# Patient Record
Sex: FEMALE | Race: ASIAN | Employment: PART TIME | URBAN - METROPOLITAN AREA
[De-identification: names, ages, dates, MRNs, and addresses within clinical notes are randomized per-mention and may not be internally consistent; named-entity substitution may affect disease eponyms.]

---

## 2017-05-19 ENCOUNTER — HOSPITAL ENCOUNTER (EMERGENCY)
Facility: CLINIC | Age: 32
Discharge: HOME | End: 2017-05-20
Attending: EMERGENCY MEDICINE | Admitting: EMERGENCY MEDICINE

## 2017-05-19 ENCOUNTER — APPOINTMENT (OUTPATIENT)
Dept: GENERAL RADIOLOGY | Facility: CLINIC | Age: 32
End: 2017-05-19
Attending: EMERGENCY MEDICINE

## 2017-05-19 DIAGNOSIS — S93.602A FOOT SPRAIN, LEFT, INITIAL ENCOUNTER: ICD-10-CM

## 2017-05-19 PROCEDURE — 73630 X-RAY EXAM OF FOOT: CPT | Mod: LT

## 2017-05-19 PROCEDURE — 99284 EMERGENCY DEPT VISIT MOD MDM: CPT

## 2017-05-19 PROCEDURE — 25000132 ZZH RX MED GY IP 250 OP 250 PS 637: Performed by: EMERGENCY MEDICINE

## 2017-05-19 RX ORDER — IBUPROFEN 600 MG/1
600 TABLET, FILM COATED ORAL ONCE
Status: COMPLETED | OUTPATIENT
Start: 2017-05-19 | End: 2017-05-19

## 2017-05-19 RX ADMIN — IBUPROFEN 600 MG: 600 TABLET ORAL at 22:30

## 2017-05-19 NOTE — ED AVS SNAPSHOT
[facility] Emergency Department    [address]    [facility] [address]    Phone:  [phone]    Fax:  [phone]                                       Lucho Lugo   MRN: [id number]    Department:  [facility] Emergency Department   Date of Visit:  5/19/2017           After Visit Summary Signature Page     I have received my discharge instructions, and my questions have been answered. I have discussed any challenges I see with this plan with the nurse or doctor.    ..........................................................................................................................................  Patient/Patient Representative Signature      ..........................................................................................................................................  Patient Representative Print Name and Relationship to Patient    ..................................................               ................................................  Date                                            Time    ..........................................................................................................................................  Reviewed by Signature/Title    ...................................................              ..............................................  Date                                                            Time

## 2017-05-19 NOTE — ED AVS SNAPSHOT
[facility] Emergency Department    [address] [place] [address]    Phone:  [phone]    Fax:  [phone]                                       Lucho Lugo   MRN: [id number]    Department:  [facility] Emergency Department   Date of Visit:  5/19/2017           Patient Information     Date Of Birth          1985        Your diagnoses for this visit were:     Foot sprain, left, initial encounter        You were seen by Rose Blair MD.      Follow-up Information     Follow up with Adelina Aguilera MD In 3 days.    Specialty:  Family Practice    Contact information:    [facility] [place]  [address] [place] [address]  [phone]          Discharge Instructions       Please follow up closely with your regular physician. Please return to the ED if your symptoms worsen or if you develop new or concerning symptoms.         Foot Sprain    A sprain is a stretching or tearing of the ligaments that hold a joint together. There are no broken bones. Sprains generally take from 3-6 weeks to heal. A sprain may be treated with a splint, walking cast, or special boot. Mild sprains may not need any additional support.  Home care  The following guidelines will help you care for your injury at home:    Keep your leg elevated when sitting or lying down. This is very important during the first 48 hours to reduce swelling. Stay off the injured foot as much as possible until you can walk on it without pain. If needed, you may use crutches during the first week for this purpose. Crutches can be rented at many pharmacies or surgical/orthopedic supply stores.    You may be given a cast shoe to wear to prevent movement in your foot. If not, you can use a sandal or any shoe that does not put pressure on the injured area until the swelling and pain go away. If using a sandal, be careful not to hit your foot against anything, since another injury could  make the sprain worse.    Apply an ice pack over the injured area for 15 to 20 minutes every 3 to 6 hours. You should do this for the first 24 to 48 hours. You can make an ice pack by filling a plastic bag that seals at the top with ice cubes and then wrapping it with a thin towel. Continue to use ice packs for relief of pain and swelling as needed. As the ice melts, avoid getting any wrap, splint, or cast wet. After 48 hours, apply heat from a warm shower or bath for 20 minutes several times daily. Alternating ice and heat may also be helpful.    You may use over-the-counter pain medicine to control pain, unless another medicine was prescribed. If you have chronic liver or kidney disease or ever had a stomach ulcer or GI bleeding, talk with your healthcare provider before using these medicines.    If you were given a splint or cast, keep it dry. Bathe with your splint or cast well out of the water, protected with 2 large plastic bags, rubber-banded at the top end. If a fiberglass splint or cast gets wet, you can dry it with a hair dryer.    You may return to sports after healing, when you can run without pain.  Follow-up care  Follow up with your healthcare provider as directed. Sometimes fractures don t show up on the first X-ray. Bruises and sprains can sometimes hurt as much as a fracture. These injuries can take time to heal completely. If your symptoms don t improve or they get worse, talk with your healthcare provider. You may need a repeat X-ray.  When to seek medical advice  Call your healthcare provider right away if any of these occur:    The plaster cast or splint gets wet or soft    The fiberglass cast or splint gets wet and does not dry for 24 hours    Pain or swelling increases, or redness appears    A bad odor comes from within the cast    Fever of 100.4 F (38 C) or above lasting for 24 to 48 hours    Toes on the injured foot become cold, blue, numb, or tingly    7408-8633 [employer]  [address] [place] [address]. All rights reserved. This information is not intended as a substitute for professional medical care. Always follow your healthcare professional's instructions.          24 Hour Appointment Hotline       To make an appointment at any [facility], call [phone] ([phone]). If you don't have a family doctor or clinic, we will help you find one. [facility] are conveniently located to serve the needs of you and your family.             Review of your medicines      Notice     You have not been prescribed any medications.            Procedures and tests performed during your visit     Foot XR, G/E 3 views, left      Orders Needing Specimen Collection     None      Pending Results     Date and Time Order Name Status Description    5/19/2017 2228 Foot XR, G/E 3 views, left Preliminary             Pending Culture Results     No orders found for last 3 day(s).            Pending Results Instructions     If you had any lab results that were not finalized at the time of your Discharge, you can call the ED Lab Result RN at [phone]. You will be contacted by this team for any positive Lab results or changes in treatment. The nurses are available 7 days a week from 10A to 6:30P.  You can leave a message 24 hours per day and they will return your call.        Test Results From Your Hospital Stay        5/20/2017 12:10 AM      Narrative     XR FOOT LT G/E 3 VW  5/19/2017 11:32 PM      HISTORY: Jumped and felt pain when landing.    COMPARISON: None.        Impression     IMPRESSION: No acute fracture or dislocation.                Clinical Quality Measure: Blood Pressure Screening     Your blood pressure was checked while you were in the emergency department today. The last reading we obtained was  BP: 138/72 . Please read the guidelines below about what these numbers mean and what you should do about them.  If your systolic blood pressure (the top number) is less  "than 120 and your diastolic blood pressure (the bottom number) is less than 80, then your blood pressure is normal. There is nothing more that you need to do about it.  If your systolic blood pressure (the top number) is 120-139 or your diastolic blood pressure (the bottom number) is 80-89, your blood pressure may be higher than it should be. You should have your blood pressure rechecked within a year by a primary care provider.  If your systolic blood pressure (the top number) is 140 or greater or your diastolic blood pressure (the bottom number) is 90 or greater, you may have high blood pressure. High blood pressure is treatable, but if left untreated over time it can put you at risk for heart attack, stroke, or kidney failure. You should have your blood pressure rechecked by a primary care provider within the next 4 weeks.  If your provider in the emergency department today gave you specific instructions to follow-up with your doctor or provider even sooner than that, you should follow that instruction and not wait for up to 4 weeks for your follow-up visit.        Thank you for choosing [facility]       Thank you for choosing [facility] for your care. Our goal is always to provide you with excellent care. Hearing back from our patients is one way we can continue to improve our services. Please take a few minutes to complete the written survey that you may receive in the mail after you visit with us. Thank you!        [employer] Processing Information     [employer] lets you send messages to your doctor, view your test results, renew your prescriptions, schedule appointments and more. To sign up, go to [email or web address] . Click on \"Log in\" on the left side of the screen, which will take you to the Welcome page. Then click on \"Sign up Now\" on the right side of the page.     You will be asked to enter the access code listed below, as well as some personal information. Please follow the directions to create your username and " password.     Your access code is: [email or web address]  Expires: 2017 12:41 AM     Your access code will  in 90 days. If you need help or a new code, please call your [facility] or [phone].        Care EveryWhere ID     This is your Care EveryWhere ID. This could be used by other organizations to access your [place] medical records  [id number]        After Visit Summary       This is your record. Keep this with you and show to your community pharmacist(s) and doctor(s) at your next visit.

## 2017-05-20 VITALS
TEMPERATURE: 98 F | DIASTOLIC BLOOD PRESSURE: 79 MMHG | HEART RATE: 75 BPM | RESPIRATION RATE: 18 BRPM | SYSTOLIC BLOOD PRESSURE: 115 MMHG | OXYGEN SATURATION: 100 %

## 2017-05-20 NOTE — DISCHARGE INSTRUCTIONS
Please follow up closely with your regular physician. Please return to the ED if your symptoms worsen or if you develop new or concerning symptoms.         Foot Sprain    A sprain is a stretching or tearing of the ligaments that hold a joint together. There are no broken bones. Sprains generally take from 3-6 weeks to heal. A sprain may be treated with a splint, walking cast, or special boot. Mild sprains may not need any additional support.  Home care  The following guidelines will help you care for your injury at home:    Keep your leg elevated when sitting or lying down. This is very important during the first 48 hours to reduce swelling. Stay off the injured foot as much as possible until you can walk on it without pain. If needed, you may use crutches during the first week for this purpose. Crutches can be rented at many pharmacies or surgical/orthopedic supply stores.    You may be given a cast shoe to wear to prevent movement in your foot. If not, you can use a sandal or any shoe that does not put pressure on the injured area until the swelling and pain go away. If using a sandal, be careful not to hit your foot against anything, since another injury could make the sprain worse.    Apply an ice pack over the injured area for 15 to 20 minutes every 3 to 6 hours. You should do this for the first 24 to 48 hours. You can make an ice pack by filling a plastic bag that seals at the top with ice cubes and then wrapping it with a thin towel. Continue to use ice packs for relief of pain and swelling as needed. As the ice melts, avoid getting any wrap, splint, or cast wet. After 48 hours, apply heat from a warm shower or bath for 20 minutes several times daily. Alternating ice and heat may also be helpful.    You may use over-the-counter pain medicine to control pain, unless another medicine was prescribed. If you have chronic liver or kidney disease or ever had a stomach ulcer or GI bleeding, talk with your healthcare  provider before using these medicines.    If you were given a splint or cast, keep it dry. Bathe with your splint or cast well out of the water, protected with 2 large plastic bags, rubber-banded at the top end. If a fiberglass splint or cast gets wet, you can dry it with a hair dryer.    You may return to sports after healing, when you can run without pain.  Follow-up care  Follow up with your healthcare provider as directed. Sometimes fractures don t show up on the first X-ray. Bruises and sprains can sometimes hurt as much as a fracture. These injuries can take time to heal completely. If your symptoms don t improve or they get worse, talk with your healthcare provider. You may need a repeat X-ray.  When to seek medical advice  Call your healthcare provider right away if any of these occur:    The plaster cast or splint gets wet or soft    The fiberglass cast or splint gets wet and does not dry for 24 hours    Pain or swelling increases, or redness appears    A bad odor comes from within the cast    Fever of 100.4 F (38 C) or above lasting for 24 to 48 hours    Toes on the injured foot become cold, blue, numb, or tingly    1208-8972 [employer]. [address] [place] [address], [place] [address]. All rights reserved. This information is not intended as a substitute for professional medical care. Always follow your healthcare professional's instructions.

## 2017-08-23 ENCOUNTER — APPOINTMENT (OUTPATIENT)
Dept: ULTRASOUND IMAGING | Facility: CLINIC | Age: 32
End: 2017-08-23
Attending: EMERGENCY MEDICINE
Payer: COMMERCIAL

## 2017-08-23 ENCOUNTER — HOSPITAL ENCOUNTER (EMERGENCY)
Facility: CLINIC | Age: 32
Discharge: HOME | End: 2017-08-23
Attending: EMERGENCY MEDICINE | Admitting: EMERGENCY MEDICINE
Payer: COMMERCIAL

## 2017-08-23 VITALS
HEART RATE: 62 BPM | OXYGEN SATURATION: 100 % | WEIGHT: 138 LBS | DIASTOLIC BLOOD PRESSURE: 93 MMHG | TEMPERATURE: 97 F | BODY MASS INDEX: 24.06 KG/M2 | RESPIRATION RATE: 16 BRPM | SYSTOLIC BLOOD PRESSURE: 138 MMHG

## 2017-08-23 DIAGNOSIS — O20.0 THREATENED ABORTION: ICD-10-CM

## 2017-08-23 LAB
ABO + RH BLD: NORMAL
ABO + RH BLD: NORMAL
B-HCG SERPL-ACNC: ABNORMAL IU/L (ref 0–5)
BASOPHILS # BLD AUTO: 0 10E9/L (ref 0–0.2)
BASOPHILS NFR BLD AUTO: 0.4 %
BLD GP AB SCN SERPL QL: NORMAL
BLOOD BANK CMNT PATIENT-IMP: NORMAL
DIFFERENTIAL METHOD BLD: ABNORMAL
EOSINOPHIL # BLD AUTO: 0 10E9/L (ref 0–0.7)
EOSINOPHIL NFR BLD AUTO: 0.5 %
ERYTHROCYTE [DISTWIDTH] IN BLOOD BY AUTOMATED COUNT: 14.3 % (ref 10–15)
HCT VFR BLD AUTO: 37.8 % (ref 35–47)
HGB BLD-MCNC: 12 G/DL (ref 11.7–15.7)
IMM GRANULOCYTES # BLD: 0 10E9/L (ref 0–0.4)
IMM GRANULOCYTES NFR BLD: 0.5 %
LYMPHOCYTES # BLD AUTO: 1.9 10E9/L (ref 0.8–5.3)
LYMPHOCYTES NFR BLD AUTO: 24.7 %
MCH RBC QN AUTO: 26.3 PG (ref 26.5–33)
MCHC RBC AUTO-ENTMCNC: 31.7 G/DL (ref 31.5–36.5)
MCV RBC AUTO: 83 FL (ref 78–100)
MONOCYTES # BLD AUTO: 0.6 10E9/L (ref 0–1.3)
MONOCYTES NFR BLD AUTO: 7.1 %
NEUTROPHILS # BLD AUTO: 5.2 10E9/L (ref 1.6–8.3)
NEUTROPHILS NFR BLD AUTO: 66.8 %
NRBC # BLD AUTO: 0 10*3/UL
NRBC BLD AUTO-RTO: 0 /100
PLATELET # BLD AUTO: 184 10E9/L (ref 150–450)
RBC # BLD AUTO: 4.57 10E12/L (ref 3.8–5.2)
SPECIMEN EXP DATE BLD: NORMAL
WBC # BLD AUTO: 7.8 10E9/L (ref 4–11)

## 2017-08-23 PROCEDURE — 99284 EMERGENCY DEPT VISIT MOD MDM: CPT | Mod: 25

## 2017-08-23 PROCEDURE — 86900 BLOOD TYPING SEROLOGIC ABO: CPT | Performed by: EMERGENCY MEDICINE

## 2017-08-23 PROCEDURE — 86850 RBC ANTIBODY SCREEN: CPT | Performed by: EMERGENCY MEDICINE

## 2017-08-23 PROCEDURE — 84702 CHORIONIC GONADOTROPIN TEST: CPT | Performed by: EMERGENCY MEDICINE

## 2017-08-23 PROCEDURE — 86901 BLOOD TYPING SEROLOGIC RH(D): CPT | Performed by: EMERGENCY MEDICINE

## 2017-08-23 PROCEDURE — 85025 COMPLETE CBC W/AUTO DIFF WBC: CPT | Performed by: EMERGENCY MEDICINE

## 2017-08-23 PROCEDURE — 76801 OB US < 14 WKS SINGLE FETUS: CPT

## 2017-08-23 PROCEDURE — 36415 COLL VENOUS BLD VENIPUNCTURE: CPT | Performed by: EMERGENCY MEDICINE

## 2017-08-23 ASSESSMENT — ENCOUNTER SYMPTOMS: ABDOMINAL PAIN: 0

## 2017-08-23 NOTE — ED PROVIDER NOTES
History     Chief Complaint:  Vaginal Bleeding      HPI   Lucho Lugo is an approximately 5 weeks gravid 32 year old female who presents with vaginal bleeding. Patient reports onset of vaginal bleeding this morning noticing two drops when wiping. The patient has not seen any tissue or clots in the bleeding which she describes as drops of blood. LMP 7/15, the patient has not seen her OBGYN. Patient is concerned this is because she picked up her daughter and wonders if she should be restricting her activity. She denies pelvic/abdominal pain or other complaint.     Allergies:  Amoxicillin      Medications:    The patient is not currently taking any prescribed medications.     Past Medical History:    Gestational thrombocytopenia  Gestational DM    Past Surgical History:     x 2  Cholecystectomy    Family History:    History reviewed. No pertinent family history.      Social History:  Presents with spouse and children   Tobacco use: Never  Alcohol use: Negative  PCP: Adelina Aguilera    Marital Status:      Review of Systems   Gastrointestinal: Negative for abdominal pain.   Genitourinary: Positive for vaginal bleeding.       Physical Exam     Patient Vitals for the past 24 hrs:   BP Temp Temp src Pulse Resp SpO2 Weight   17 1237 (!) 138/93 97  F (36.1  C) Temporal 62 16 100 % 62.6 kg (138 lb)        Physical Exam  Constitutional: Patient is well appearing. No distress.  Head: Atraumatic.  Mouth/Throat: Oropharynx is clear and moist. No oropharyngeal exudate.  Eyes: Conjunctivae and EOM are normal. No scleral icterus.  Neck: Normal range of motion. Neck supple.   Cardiovascular: Normal rate, regular rhythm, normal heart sounds and intact distal pulses.   Pulmonary/Chest: Breath sounds normal. No respiratory distress.  Abdominal: Soft. Bowel sounds are normal. No distension. No tenderness. No rebound or guarding.   Musculoskeletal: Normal range of motion. No edema or tenderness.    Neurological: Alert and orientated to person, place, and time. No observable focal neuro deficit  Skin: Warm and dry. No rash noted. Not diaphoretic.      Emergency Department Course   Imaging:  Radiographic findings were communicated with the patient and family who voiced understanding of the findings.    US OB <14 Weeks with transvaginal:  pending    Laboratory:  CBC: pending    HCG quant: pending    ABO/Rh type and screen: pending    Emergency Department Course:  Past medical records, nursing notes, and vitals reviewed.  1251: I performed an exam of the patient and obtained history, as documented above.  Blood drawn and labs sent.   The patient was sent for an US while in the emergency department, findings above.   Patient signed out to my colleague, Dr. Jessica Glover who will follow up on labs and imaging before determining final disposition.     Impression & Plan    Medical Decision Making:  Threatened Ab with IUP.  Will make follow up appt with ob in 48 hours.  No trauma.  No abd or pelvic pain.  Defers pelvic at this time.     Diagnosis:    ICD-10-CM    1. Threatened  O20.0        Disposition:  Patient signed out to my colleague, Dr. Jessica Glover who will follow up on labs and imaging before determining final disposition.       Warner Quarles  2017   [facility] EMERGENCY DEPARTMENT  I, Warner Quarles, am serving as a scribe at 12:51 PM on 2017 to document services personally performed by Mickey Mccormick MD based on my observations and the provider's statements to me.       Mickey Mccormick MD  [phone]

## 2017-08-23 NOTE — ED NOTES
Arrives to ED with vaginal bleeding started this am, pt is approx 5 wks pregnant LMP 7/15/17, denies pain. A/ox 3. VSS. Pt is tearful and scared here with family.

## 2017-08-23 NOTE — ED AVS SNAPSHOT
[facility] Emergency Department    [address] [place] [address]    Phone:  [phone]    Fax:  [phone]                                       Lucho Lugo   MRN: [id number]    Department:  [facility] Emergency Department   Date of Visit:  2017           Patient Information     Date Of Birth          1985        Your diagnoses for this visit were:     Threatened         You were seen by Mickey Mccormick MD and Jessica Glover MD.      Follow-up Information     Follow up with Adelina Aguilera MD. Schedule an appointment as soon as possible for a visit in 2 days.    Specialty:  Family Practice    Contact information:    [facility] [place]  [address]   [place] [address]  [phone]          Discharge Instructions       Discharge Instructions  Vaginal Bleeding in Pregnancy    Bleeding in early pregnancy can be a sign of a miscarriage in process or an abnormal pregnancy, but often is innocent and the pregnancy will continue normally. We may do blood pregnancy tests and ultrasound to try to determine what is causing the bleeding in your case, but sometimes we can't tell and need to follow you with time, more blood tests, and another ultrasound.     Return to the Emergency Department if:    You have severe abdominal or pelvic pain.    You faint, or feel lightheaded or dizzy.     Your bleeding is gets much worse, and is heavier than a heavy period or if you pass any blood clots larger than a quarter.    You pass any tissue--solid material that doesn't appear smooth and even like a blood clot. If you pass tissue, save it (even if you have to pull it out of the toilet) and put it in a plastic bag or jar and bring it in.      You have a fever of 100.5 degrees or higher.  If no pregnancy could be seen:    It may be that everything is normal and it is just too early to see the pregnancy, but you could have an ectopic pregnancy,  which is a pregnancy in an abnormal location, such as in the tube. An ectopic pregnancy can cause severe internal bleeding or death.    You need to be seen by your regular doctor, or an OB/GYN doctor, in 48-72 hours for a repeat blood pregnancy test.    You should not be alone, in case you suddenly become very sick.     You should not have sex or put anything in your vagina.     If a pregnancy was seen in your uterus:    If a heartbeat could be seen, the chance of miscarriage is much lower.    You need to see your regular doctor, or an OB/GYN doctor, within 2-3 days.    You should not have sex or put anything in your vagina.     Facts about miscarriage: We hope you don't have a miscarriage, but if you do, here are important things to know:    Early miscarriage is very common, and having one miscarriage doesn't mean you will have problems with another pregnancy.    Nothing you did caused it. Taking medicine, drinking alcohol, having sex, exercising, or falling down won't cause a miscarriage.     If you were given a prescription for medicine here today, be sure to read all of the information (including the package insert) that comes with your prescription.  This will include important information about the medicine, its side effects, and any warnings that you need to know about.  The pharmacist who fills the prescription can provide more information and answer questions you may have about the medicine.  If you have questions or concerns that the pharmacist cannot address, please call or return to the Emergency Department.           Discharge References/Attachments     POSSIBLE MISCARRIAGE (THREATENED ) (ENGLISH)      24 Hour Appointment Hotline       To make an appointment at any [facility], call [phone] ([phone]). If you don't have a family doctor or clinic, we will help you find one. [facility] clinics are conveniently located to serve the needs of you and your family.             Review of  your medicines      Notice     You have not been prescribed any medications.            Procedures and tests performed during your visit     ABO/Rh type and screen    CBC with platelets differential    HCG quantitative pregnancy    US OB < 14 Weeks Single      Orders Needing Specimen Collection     None      Pending Results     No orders found from 8/21/2017 to 8/24/2017.            Pending Culture Results     No orders found from 8/21/2017 to 8/24/2017.            Pending Results Instructions     If you had any lab results that were not finalized at the time of your Discharge, you can call the ED Lab Result RN at [phone]. You will be contacted by this team for any positive Lab results or changes in treatment. The nurses are available 7 days a week from 10A to 6:30P.  You can leave a message 24 hours per day and they will return your call.        Test Results From Your Hospital Stay              8/23/2017  2:06 PM      Component Results     Component Value Ref Range & Units Status    WBC 7.8 4.0 - 11.0 10e9/L Final    RBC Count 4.57 3.8 - 5.2 10e12/L Final    Hemoglobin 12.0 11.7 - 15.7 g/dL Final    Hematocrit 37.8 35.0 - 47.0 % Final    MCV 83 78 - 100 fl Final    MCH 26.3 (L) 26.5 - 33.0 pg Final    MCHC 31.7 31.5 - 36.5 g/dL Final    RDW 14.3 10.0 - 15.0 % Final    Platelet Count 184 150 - 450 10e9/L Final    Diff Method Automated Method  Final    % Neutrophils 66.8 % Final    % Lymphocytes 24.7 % Final    % Monocytes 7.1 % Final    % Eosinophils 0.5 % Final    % Basophils 0.4 % Final    % Immature Granulocytes 0.5 % Final    Nucleated RBCs 0 0 /100 Final    Absolute Neutrophil 5.2 1.6 - 8.3 10e9/L Final    Absolute Lymphocytes 1.9 0.8 - 5.3 10e9/L Final    Absolute Monocytes 0.6 0.0 - 1.3 10e9/L Final    Absolute Eosinophils 0.0 0.0 - 0.7 10e9/L Final    Absolute Basophils 0.0 0.0 - 0.2 10e9/L Final    Abs Immature Granulocytes 0.0 0 - 0.4 10e9/L Final    Absolute Nucleated RBC 0.0  Final         8/23/2017   2:46 PM      Component Results     Component Value Ref Range & Units Status    HCG Quantitative Serum 66338 (H) 0 - 5 IU/L Final    Specimen run with a dilution         8/23/2017  3:10 PM      Component Results     Component Value Ref Range & Units Status    ABO A  Final    RH(D) Pos  Final    Antibody Screen Neg  Final    Test Valid Only At [facility]     Final    Specimen Expires 08/26/2017  Final         8/23/2017  2:03 PM      Narrative     US OB < 14 WEEKS SINGLE  8/23/2017 1:58 PM    HISTORY: Vaginal bleeding during early pregnancy.    TECHNIQUE: Transabdominal imaging only was performed.    COMPARISON:  None.    FINDINGS:      Estimated gestational age by current ultrasound measurement: 7 weeks.  Estimated date of delivery based on this ultrasound: 4/11/2018.  Crown-rump length: 1.0 cm.   Embryonic cardiac activity: 138 bpm.   Yolk sac: Present.  Subchorionic hemorrhage: None.    Right ovary: Not visualized.  Left ovary: Contains a small probable corpus luteum cyst.  Adnexal mass: None.  Free pelvic fluid: None.        Impression     IMPRESSION: Single live intrauterine pregnancy of estimated  gestational age 7 weeks.         MANUELA GUAN MD                Clinical Quality Measure: Blood Pressure Screening     Your blood pressure was checked while you were in the emergency department today. The last reading we obtained was  BP: (!) 138/93 . Please read the guidelines below about what these numbers mean and what you should do about them.  If your systolic blood pressure (the top number) is less than 120 and your diastolic blood pressure (the bottom number) is less than 80, then your blood pressure is normal. There is nothing more that you need to do about it.  If your systolic blood pressure (the top number) is 120-139 or your diastolic blood pressure (the bottom number) is 80-89, your blood pressure may be higher than it should be. You should have your blood pressure rechecked within a year  "by a primary care provider.  If your systolic blood pressure (the top number) is 140 or greater or your diastolic blood pressure (the bottom number) is 90 or greater, you may have high blood pressure. High blood pressure is treatable, but if left untreated over time it can put you at risk for heart attack, stroke, or kidney failure. You should have your blood pressure rechecked by a primary care provider within the next 4 weeks.  If your provider in the emergency department today gave you specific instructions to follow-up with your doctor or provider even sooner than that, you should follow that instruction and not wait for up to 4 weeks for your follow-up visit.        Thank you for choosing [facility]       Thank you for choosing [facility] for your care. Our goal is always to provide you with excellent care. Hearing back from our patients is one way we can continue to improve our services. Please take a few minutes to complete the written survey that you may receive in the mail after you visit with us. Thank you!        [employer] Information     [employer] lets you send messages to your doctor, view your test results, renew your prescriptions, schedule appointments and more. To sign up, go to [email or web address] . Click on \"Log in\" on the left side of the screen, which will take you to the Welcome page. Then click on \"Sign up Now\" on the right side of the page.     You will be asked to enter the access code listed below, as well as some personal information. Please follow the directions to create your username and password.     Your access code is: [email or web address]  Expires: 2017  3:17 PM     Your access code will  in 90 days. If you need help or a new code, please call your [facility] clinic or [phone].        Care EveryWhere ID     This is your Care EveryWhere ID. This could be used by other organizations to access your [facility] medical records  [id number]        Equal Access to Services     MARLIN LEWIS" AH: Kizzy Muller, wafernanda lufrannyadaha, qachuyta kahermelindo mccarthy. So [facility] [phone].    ATENCIÓN: Si habla español, tiene a haas disposición servicios gratuitos de asistencia lingüística. Llame al [phone].    We comply with applicable federal civil rights laws and [place] laws. We do not discriminate on the basis of race, color, national origin, age, disability sex, sexual orientation or gender identity.            After Visit Summary       This is your record. Keep this with you and show to your community pharmacist(s) and doctor(s) at your next visit.

## 2017-08-23 NOTE — ED AVS SNAPSHOT
[facility] Emergency Department    [address]    [facility] [address]    Phone:  [phone]    Fax:  [phone]                                       Lucho Lugo   MRN: [id number]    Department:  [facility] Emergency Department   Date of Visit:  8/23/2017           After Visit Summary Signature Page     I have received my discharge instructions, and my questions have been answered. I have discussed any challenges I see with this plan with the nurse or doctor.    ..........................................................................................................................................  Patient/Patient Representative Signature      ..........................................................................................................................................  Patient Representative Print Name and Relationship to Patient    ..................................................               ................................................  Date                                            Time    ..........................................................................................................................................  Reviewed by Signature/Title    ...................................................              ..............................................  Date                                                            Time

## 2017-08-23 NOTE — DISCHARGE INSTRUCTIONS
Discharge Instructions  Vaginal Bleeding in Pregnancy    Bleeding in early pregnancy can be a sign of a miscarriage in process or an abnormal pregnancy, but often is innocent and the pregnancy will continue normally. We may do blood pregnancy tests and ultrasound to try to determine what is causing the bleeding in your case, but sometimes we can't tell and need to follow you with time, more blood tests, and another ultrasound.     Return to the Emergency Department if:    You have severe abdominal or pelvic pain.    You faint, or feel lightheaded or dizzy.     Your bleeding is gets much worse, and is heavier than a heavy period or if you pass any blood clots larger than a quarter.    You pass any tissue--solid material that doesn't appear smooth and even like a blood clot. If you pass tissue, save it (even if you have to pull it out of the toilet) and put it in a plastic bag or jar and bring it in.      You have a fever of 100.5 degrees or higher.  If no pregnancy could be seen:    It may be that everything is normal and it is just too early to see the pregnancy, but you could have an ectopic pregnancy, which is a pregnancy in an abnormal location, such as in the tube. An ectopic pregnancy can cause severe internal bleeding or death.    You need to be seen by your regular doctor, or an OB/GYN doctor, in 48-72 hours for a repeat blood pregnancy test.    You should not be alone, in case you suddenly become very sick.     You should not have sex or put anything in your vagina.     If a pregnancy was seen in your uterus:    If a heartbeat could be seen, the chance of miscarriage is much lower.    You need to see your regular doctor, or an OB/GYN doctor, within 2-3 days.    You should not have sex or put anything in your vagina.     Facts about miscarriage: We hope you don't have a miscarriage, but if you do, here are important things to know:    Early miscarriage is very common, and having one miscarriage doesn't mean  you will have problems with another pregnancy.    Nothing you did caused it. Taking medicine, drinking alcohol, having sex, exercising, or falling down won't cause a miscarriage.     If you were given a prescription for medicine here today, be sure to read all of the information (including the package insert) that comes with your prescription.  This will include important information about the medicine, its side effects, and any warnings that you need to know about.  The pharmacist who fills the prescription can provide more information and answer questions you may have about the medicine.  If you have questions or concerns that the pharmacist cannot address, please call or return to the Emergency Department.

## 2017-09-22 LAB
HBV SURFACE AG SERPL QL IA: NORMAL
HIV 1+2 AB+HIV1 P24 AG SERPL QL IA: NORMAL
RUBELLA ANTIBODY IGG QUANTITATIVE: NORMAL IU/ML
T PALLIDUM IGG SER QL: NORMAL

## 2017-12-24 ENCOUNTER — HEALTH MAINTENANCE LETTER (OUTPATIENT)
Age: 32
End: 2017-12-24

## 2018-01-24 LAB — T PALLIDUM IGG SER QL: NORMAL

## 2018-03-29 DIAGNOSIS — Z01.812 PRE-OPERATIVE LABORATORY EXAMINATION: Primary | ICD-10-CM

## 2018-03-29 RX ORDER — PRENATAL VIT/IRON FUM/FOLIC AC 27MG-0.8MG
1 TABLET ORAL DAILY
COMMUNITY
End: 2020-07-23

## 2018-04-02 ENCOUNTER — HOSPITAL ENCOUNTER (OUTPATIENT)
Dept: LAB | Facility: CLINIC | Age: 33
Discharge: HOME | End: 2018-04-02
Attending: OBSTETRICS & GYNECOLOGY | Admitting: OBSTETRICS & GYNECOLOGY
Payer: COMMERCIAL

## 2018-04-02 DIAGNOSIS — Z01.812 PRE-OPERATIVE LABORATORY EXAMINATION: ICD-10-CM

## 2018-04-02 LAB
ABO + RH BLD: NORMAL
ABO + RH BLD: NORMAL
BLD GP AB SCN SERPL QL: NORMAL
BLOOD BANK CMNT PATIENT-IMP: NORMAL
HGB BLD-MCNC: 13.3 G/DL (ref 11.7–15.7)
SPECIMEN EXP DATE BLD: NORMAL

## 2018-04-02 PROCEDURE — 86900 BLOOD TYPING SEROLOGIC ABO: CPT | Performed by: OBSTETRICS & GYNECOLOGY

## 2018-04-02 PROCEDURE — 85018 HEMOGLOBIN: CPT | Performed by: OBSTETRICS & GYNECOLOGY

## 2018-04-02 PROCEDURE — 86850 RBC ANTIBODY SCREEN: CPT | Performed by: OBSTETRICS & GYNECOLOGY

## 2018-04-02 PROCEDURE — 36415 COLL VENOUS BLD VENIPUNCTURE: CPT | Performed by: OBSTETRICS & GYNECOLOGY

## 2018-04-02 PROCEDURE — 86901 BLOOD TYPING SEROLOGIC RH(D): CPT | Performed by: OBSTETRICS & GYNECOLOGY

## 2018-04-03 NOTE — PHARMACY-ADMISSION MEDICATION HISTORY
Admission medication history interview status for this patient is complete. See [facility] admission navigator for allergy information, prior to admission medications and immunization status.     Med rec completed by pre admitting Pamella Giordano RN jake Mar 29, 2018 10:55 AM       For patients on insulin therapy: Y  Prior to Admission medications    Medication Sig Last Dose Taking? Auth Provider   Prenatal Vit-Fe Fumarate-FA (PRENATAL MULTIVITAMIN PLUS IRON) 27-0.8 MG TABS per tablet Take 1 tablet by mouth daily  Yes Reported, Patient   insulin regular (HUMULIN R/NOVOLIN R) 100 UNIT/ML injection Inject 9 Units Subcutaneous 2 times daily (before meals)  Yes Reported, Patient   INSULIN NPH, HUMAN,, ISOPHANE, SC Inject 8 Units Subcutaneous 2 times daily  Yes Reported, Patient

## 2018-04-05 ENCOUNTER — ANESTHESIA EVENT (OUTPATIENT)
Dept: OBGYN | Facility: CLINIC | Age: 33
End: 2018-04-05
Payer: COMMERCIAL

## 2018-04-05 ENCOUNTER — HOSPITAL ENCOUNTER (INPATIENT)
Facility: CLINIC | Age: 33
LOS: 2 days | Discharge: HOME HEALTH | End: 2018-04-07
Attending: OBSTETRICS & GYNECOLOGY | Admitting: OBSTETRICS & GYNECOLOGY
Payer: COMMERCIAL

## 2018-04-05 ENCOUNTER — ANESTHESIA (OUTPATIENT)
Dept: OBGYN | Facility: CLINIC | Age: 33
End: 2018-04-05
Payer: COMMERCIAL

## 2018-04-05 DIAGNOSIS — Z98.891 S/P CESAREAN SECTION: Primary | ICD-10-CM

## 2018-04-05 LAB
BASE DEFICIT BLDA-SCNC: 4.6 MMOL/L
BASOPHILS # BLD AUTO: 0.1 10E9/L (ref 0–0.2)
BASOPHILS NFR BLD AUTO: 0.4 %
DIFFERENTIAL METHOD BLD: ABNORMAL
EOSINOPHIL # BLD AUTO: 0 10E9/L (ref 0–0.7)
EOSINOPHIL NFR BLD AUTO: 0.3 %
ERYTHROCYTE [DISTWIDTH] IN BLOOD BY AUTOMATED COUNT: 14.7 % (ref 10–15)
GLUCOSE BLDC GLUCOMTR-MCNC: 102 MG/DL (ref 70–99)
GLUCOSE BLDC GLUCOMTR-MCNC: 105 MG/DL (ref 70–99)
GLUCOSE BLDC GLUCOMTR-MCNC: 133 MG/DL (ref 70–99)
GLUCOSE BLDC GLUCOMTR-MCNC: 67 MG/DL (ref 70–99)
GLUCOSE BLDC GLUCOMTR-MCNC: 84 MG/DL (ref 70–99)
GLUCOSE BLDC GLUCOMTR-MCNC: 87 MG/DL (ref 70–99)
GLUCOSE BLDC GLUCOMTR-MCNC: 94 MG/DL (ref 70–99)
HCO3 BLD-SCNC: 20 MMOL/L (ref 21–28)
HCT VFR BLD AUTO: 43.4 % (ref 35–47)
HGB BLD-MCNC: 12.1 G/DL (ref 11.7–15.7)
HGB BLD-MCNC: 14 G/DL (ref 11.7–15.7)
IMM GRANULOCYTES # BLD: 0.2 10E9/L (ref 0–0.4)
IMM GRANULOCYTES NFR BLD: 1.8 %
LYMPHOCYTES # BLD AUTO: 2.2 10E9/L (ref 0.8–5.3)
LYMPHOCYTES NFR BLD AUTO: 19.1 %
MCH RBC QN AUTO: 27.4 PG (ref 26.5–33)
MCHC RBC AUTO-ENTMCNC: 32.3 G/DL (ref 31.5–36.5)
MCV RBC AUTO: 85 FL (ref 78–100)
MONOCYTES # BLD AUTO: 0.7 10E9/L (ref 0–1.3)
MONOCYTES NFR BLD AUTO: 6.2 %
NEUTROPHILS # BLD AUTO: 8.1 10E9/L (ref 1.6–8.3)
NEUTROPHILS NFR BLD AUTO: 72.2 %
NRBC # BLD AUTO: 0 10*3/UL
NRBC BLD AUTO-RTO: 0 /100
O2/TOTAL GAS SETTING VFR VENT: ABNORMAL %
OXYHGB MFR BLD: 97 % (ref 92–100)
PCO2 BLD: 34 MM HG (ref 35–45)
PH BLD: 7.37 PH (ref 7.35–7.45)
PLATELET # BLD AUTO: 150 10E9/L (ref 150–450)
PO2 BLD: 113 MM HG (ref 80–105)
RBC # BLD AUTO: 5.11 10E12/L (ref 3.8–5.2)
T PALLIDUM IGG+IGM SER QL: NEGATIVE
WBC # BLD AUTO: 11.2 10E9/L (ref 4–11)

## 2018-04-05 PROCEDURE — 88307 TISSUE EXAM BY PATHOLOGIST: CPT | Mod: 26 | Performed by: OBSTETRICS & GYNECOLOGY

## 2018-04-05 PROCEDURE — 27210794 ZZH OR GENERAL SUPPLY STERILE: Performed by: OBSTETRICS & GYNECOLOGY

## 2018-04-05 PROCEDURE — 71000013 ZZH RECOVERY PHASE 1 LEVEL 1 EA ADDTL HR: Performed by: OBSTETRICS & GYNECOLOGY

## 2018-04-05 PROCEDURE — 36000056 ZZH SURGERY LEVEL 3 1ST 30 MIN: Performed by: OBSTETRICS & GYNECOLOGY

## 2018-04-05 PROCEDURE — 37000008 ZZH ANESTHESIA TECHNICAL FEE, 1ST 30 MIN: Performed by: OBSTETRICS & GYNECOLOGY

## 2018-04-05 PROCEDURE — 82805 BLOOD GASES W/O2 SATURATION: CPT | Performed by: OBSTETRICS & GYNECOLOGY

## 2018-04-05 PROCEDURE — 85025 COMPLETE CBC W/AUTO DIFF WBC: CPT | Performed by: OBSTETRICS & GYNECOLOGY

## 2018-04-05 PROCEDURE — 36600 WITHDRAWAL OF ARTERIAL BLOOD: CPT

## 2018-04-05 PROCEDURE — 25000132 ZZH RX MED GY IP 250 OP 250 PS 637: Performed by: OBSTETRICS & GYNECOLOGY

## 2018-04-05 PROCEDURE — 25000128 H RX IP 250 OP 636: Performed by: OBSTETRICS & GYNECOLOGY

## 2018-04-05 PROCEDURE — 36415 COLL VENOUS BLD VENIPUNCTURE: CPT | Performed by: ANESTHESIOLOGY

## 2018-04-05 PROCEDURE — 25000128 H RX IP 250 OP 636: Performed by: ANESTHESIOLOGY

## 2018-04-05 PROCEDURE — 40000275 ZZH STATISTIC RCP TIME EA 10 MIN

## 2018-04-05 PROCEDURE — 86780 TREPONEMA PALLIDUM: CPT | Performed by: OBSTETRICS & GYNECOLOGY

## 2018-04-05 PROCEDURE — 12000029 ZZH R&B OB INTERMEDIATE

## 2018-04-05 PROCEDURE — 00000146 ZZHCL STATISTIC GLUCOSE BY METER IP

## 2018-04-05 PROCEDURE — 85018 HEMOGLOBIN: CPT | Performed by: ANESTHESIOLOGY

## 2018-04-05 PROCEDURE — 88307 TISSUE EXAM BY PATHOLOGIST: CPT | Performed by: OBSTETRICS & GYNECOLOGY

## 2018-04-05 PROCEDURE — 71000012 ZZH RECOVERY PHASE 1 LEVEL 1 FIRST HR: Performed by: OBSTETRICS & GYNECOLOGY

## 2018-04-05 PROCEDURE — 25000125 ZZHC RX 250: Performed by: NURSE ANESTHETIST, CERTIFIED REGISTERED

## 2018-04-05 PROCEDURE — 37000009 ZZH ANESTHESIA TECHNICAL FEE, EACH ADDTL 15 MIN: Performed by: OBSTETRICS & GYNECOLOGY

## 2018-04-05 PROCEDURE — 36000058 ZZH SURGERY LEVEL 3 EA 15 ADDTL MIN: Performed by: OBSTETRICS & GYNECOLOGY

## 2018-04-05 RX ORDER — MISOPROSTOL 200 UG/1
800 TABLET ORAL
Status: DISCONTINUED | OUTPATIENT
Start: 2018-04-05 | End: 2018-04-07 | Stop reason: HOSPADM

## 2018-04-05 RX ORDER — EPHEDRINE SULFATE 50 MG/ML
INJECTION, SOLUTION INTRAVENOUS PRN
Status: DISCONTINUED | OUTPATIENT
Start: 2018-04-05 | End: 2018-04-05

## 2018-04-05 RX ORDER — OXYTOCIN/0.9 % SODIUM CHLORIDE 30/500 ML
340 PLASTIC BAG, INJECTION (ML) INTRAVENOUS CONTINUOUS PRN
Status: DISCONTINUED | OUTPATIENT
Start: 2018-04-05 | End: 2018-04-07 | Stop reason: HOSPADM

## 2018-04-05 RX ORDER — SODIUM CHLORIDE, SODIUM LACTATE, POTASSIUM CHLORIDE, CALCIUM CHLORIDE 600; 310; 30; 20 MG/100ML; MG/100ML; MG/100ML; MG/100ML
INJECTION, SOLUTION INTRAVENOUS CONTINUOUS
Status: DISCONTINUED | OUTPATIENT
Start: 2018-04-05 | End: 2018-04-05 | Stop reason: HOSPADM

## 2018-04-05 RX ORDER — AMOXICILLIN 250 MG
1 CAPSULE ORAL 2 TIMES DAILY PRN
Status: DISCONTINUED | OUTPATIENT
Start: 2018-04-05 | End: 2018-04-07 | Stop reason: HOSPADM

## 2018-04-05 RX ORDER — HYDRALAZINE HYDROCHLORIDE 20 MG/ML
2.5-5 INJECTION INTRAMUSCULAR; INTRAVENOUS EVERY 10 MIN PRN
Status: DISCONTINUED | OUTPATIENT
Start: 2018-04-05 | End: 2018-04-05 | Stop reason: HOSPADM

## 2018-04-05 RX ORDER — NALOXONE HYDROCHLORIDE 0.4 MG/ML
.1-.4 INJECTION, SOLUTION INTRAMUSCULAR; INTRAVENOUS; SUBCUTANEOUS
Status: DISCONTINUED | OUTPATIENT
Start: 2018-04-05 | End: 2018-04-07 | Stop reason: HOSPADM

## 2018-04-05 RX ORDER — SODIUM CHLORIDE, SODIUM LACTATE, POTASSIUM CHLORIDE, CALCIUM CHLORIDE 600; 310; 30; 20 MG/100ML; MG/100ML; MG/100ML; MG/100ML
INJECTION, SOLUTION INTRAVENOUS CONTINUOUS
Status: DISCONTINUED | OUTPATIENT
Start: 2018-04-05 | End: 2018-04-07 | Stop reason: HOSPADM

## 2018-04-05 RX ORDER — LIDOCAINE 40 MG/G
CREAM TOPICAL
Status: DISCONTINUED | OUTPATIENT
Start: 2018-04-05 | End: 2018-04-07 | Stop reason: HOSPADM

## 2018-04-05 RX ORDER — KETOROLAC TROMETHAMINE 30 MG/ML
30 INJECTION, SOLUTION INTRAMUSCULAR; INTRAVENOUS EVERY 6 HOURS PRN
Status: DISCONTINUED | OUTPATIENT
Start: 2018-04-05 | End: 2018-04-07 | Stop reason: HOSPADM

## 2018-04-05 RX ORDER — KETOROLAC TROMETHAMINE 15 MG/ML
15 INJECTION, SOLUTION INTRAMUSCULAR; INTRAVENOUS EVERY 6 HOURS
Status: COMPLETED | OUTPATIENT
Start: 2018-04-05 | End: 2018-04-06

## 2018-04-05 RX ORDER — IBUPROFEN 400 MG/1
400 TABLET, FILM COATED ORAL EVERY 6 HOURS PRN
Status: DISCONTINUED | OUTPATIENT
Start: 2018-04-06 | End: 2018-04-07 | Stop reason: HOSPADM

## 2018-04-05 RX ORDER — METOCLOPRAMIDE HYDROCHLORIDE 5 MG/ML
10 INJECTION INTRAMUSCULAR; INTRAVENOUS EVERY 6 HOURS PRN
Status: DISCONTINUED | OUTPATIENT
Start: 2018-04-05 | End: 2018-04-07 | Stop reason: HOSPADM

## 2018-04-05 RX ORDER — OXYTOCIN 10 [USP'U]/ML
10 INJECTION, SOLUTION INTRAMUSCULAR; INTRAVENOUS
Status: DISCONTINUED | OUTPATIENT
Start: 2018-04-05 | End: 2018-04-07 | Stop reason: HOSPADM

## 2018-04-05 RX ORDER — DEXTROSE MONOHYDRATE 25 G/50ML
25-50 INJECTION, SOLUTION INTRAVENOUS
Status: DISCONTINUED | OUTPATIENT
Start: 2018-04-05 | End: 2018-04-07 | Stop reason: HOSPADM

## 2018-04-05 RX ORDER — BISACODYL 10 MG
10 SUPPOSITORY, RECTAL RECTAL DAILY PRN
Status: DISCONTINUED | OUTPATIENT
Start: 2018-04-07 | End: 2018-04-07 | Stop reason: HOSPADM

## 2018-04-05 RX ORDER — OXYTOCIN/0.9 % SODIUM CHLORIDE 30/500 ML
100 PLASTIC BAG, INJECTION (ML) INTRAVENOUS CONTINUOUS
Status: DISCONTINUED | OUTPATIENT
Start: 2018-04-05 | End: 2018-04-07 | Stop reason: HOSPADM

## 2018-04-05 RX ORDER — OXYTOCIN/0.9 % SODIUM CHLORIDE 30/500 ML
PLASTIC BAG, INJECTION (ML) INTRAVENOUS PRN
Status: DISCONTINUED | OUTPATIENT
Start: 2018-04-05 | End: 2018-04-05

## 2018-04-05 RX ORDER — ALBUTEROL SULFATE 0.83 MG/ML
2.5 SOLUTION RESPIRATORY (INHALATION) EVERY 4 HOURS PRN
Status: DISCONTINUED | OUTPATIENT
Start: 2018-04-05 | End: 2018-04-05 | Stop reason: HOSPADM

## 2018-04-05 RX ORDER — MEPERIDINE HYDROCHLORIDE 50 MG/ML
12.5 INJECTION INTRAMUSCULAR; INTRAVENOUS; SUBCUTANEOUS
Status: DISCONTINUED | OUTPATIENT
Start: 2018-04-05 | End: 2018-04-07 | Stop reason: HOSPADM

## 2018-04-05 RX ORDER — DIPHENHYDRAMINE HCL 25 MG
25 CAPSULE ORAL EVERY 6 HOURS PRN
Status: DISCONTINUED | OUTPATIENT
Start: 2018-04-05 | End: 2018-04-07 | Stop reason: HOSPADM

## 2018-04-05 RX ORDER — FENTANYL CITRATE 50 UG/ML
25-50 INJECTION, SOLUTION INTRAMUSCULAR; INTRAVENOUS
Status: DISCONTINUED | OUTPATIENT
Start: 2018-04-05 | End: 2018-04-07 | Stop reason: HOSPADM

## 2018-04-05 RX ORDER — SIMETHICONE 80 MG
80 TABLET,CHEWABLE ORAL 4 TIMES DAILY PRN
Status: DISCONTINUED | OUTPATIENT
Start: 2018-04-05 | End: 2018-04-07 | Stop reason: HOSPADM

## 2018-04-05 RX ORDER — CEFAZOLIN SODIUM 1 G/3ML
1 INJECTION, POWDER, FOR SOLUTION INTRAMUSCULAR; INTRAVENOUS SEE ADMIN INSTRUCTIONS
Status: DISCONTINUED | OUTPATIENT
Start: 2018-04-05 | End: 2018-04-05 | Stop reason: CLARIF

## 2018-04-05 RX ORDER — LANOLIN 100 %
OINTMENT (GRAM) TOPICAL
Status: DISCONTINUED | OUTPATIENT
Start: 2018-04-05 | End: 2018-04-07 | Stop reason: HOSPADM

## 2018-04-05 RX ORDER — OXYCODONE HYDROCHLORIDE 5 MG/1
5-10 TABLET ORAL
Status: DISCONTINUED | OUTPATIENT
Start: 2018-04-05 | End: 2018-04-07 | Stop reason: HOSPADM

## 2018-04-05 RX ORDER — AMOXICILLIN 250 MG
2 CAPSULE ORAL 2 TIMES DAILY PRN
Status: DISCONTINUED | OUTPATIENT
Start: 2018-04-05 | End: 2018-04-07 | Stop reason: HOSPADM

## 2018-04-05 RX ORDER — IBUPROFEN 600 MG/1
600 TABLET, FILM COATED ORAL EVERY 6 HOURS PRN
Status: DISCONTINUED | OUTPATIENT
Start: 2018-04-06 | End: 2018-04-07 | Stop reason: HOSPADM

## 2018-04-05 RX ORDER — OXYCODONE HYDROCHLORIDE 5 MG/1
5 TABLET ORAL EVERY 4 HOURS PRN
Status: DISCONTINUED | OUTPATIENT
Start: 2018-04-05 | End: 2018-04-05

## 2018-04-05 RX ORDER — IBUPROFEN 800 MG/1
800 TABLET, FILM COATED ORAL EVERY 6 HOURS PRN
Status: DISCONTINUED | OUTPATIENT
Start: 2018-04-06 | End: 2018-04-07 | Stop reason: HOSPADM

## 2018-04-05 RX ORDER — HYDROCORTISONE 2.5 %
CREAM (GRAM) TOPICAL 3 TIMES DAILY PRN
Status: DISCONTINUED | OUTPATIENT
Start: 2018-04-05 | End: 2018-04-07 | Stop reason: HOSPADM

## 2018-04-05 RX ORDER — HYDROMORPHONE HYDROCHLORIDE 1 MG/ML
.3-.5 INJECTION, SOLUTION INTRAMUSCULAR; INTRAVENOUS; SUBCUTANEOUS EVERY 10 MIN PRN
Status: DISCONTINUED | OUTPATIENT
Start: 2018-04-05 | End: 2018-04-05

## 2018-04-05 RX ORDER — NALOXONE HYDROCHLORIDE 0.4 MG/ML
.1-.4 INJECTION, SOLUTION INTRAMUSCULAR; INTRAVENOUS; SUBCUTANEOUS
Status: DISCONTINUED | OUTPATIENT
Start: 2018-04-05 | End: 2018-04-05

## 2018-04-05 RX ORDER — ONDANSETRON 2 MG/ML
4 INJECTION INTRAMUSCULAR; INTRAVENOUS EVERY 6 HOURS PRN
Status: DISCONTINUED | OUTPATIENT
Start: 2018-04-05 | End: 2018-04-07 | Stop reason: HOSPADM

## 2018-04-05 RX ORDER — CITRIC ACID/SODIUM CITRATE 334-500MG
30 SOLUTION, ORAL ORAL
Status: COMPLETED | OUTPATIENT
Start: 2018-04-05 | End: 2018-04-05

## 2018-04-05 RX ORDER — NICOTINE POLACRILEX 4 MG
15-30 LOZENGE BUCCAL
Status: DISCONTINUED | OUTPATIENT
Start: 2018-04-05 | End: 2018-04-07 | Stop reason: HOSPADM

## 2018-04-05 RX ORDER — ACETAMINOPHEN 325 MG/1
975 TABLET ORAL EVERY 8 HOURS
Status: DISCONTINUED | OUTPATIENT
Start: 2018-04-05 | End: 2018-04-07 | Stop reason: HOSPADM

## 2018-04-05 RX ORDER — ONDANSETRON 4 MG/1
4 TABLET, ORALLY DISINTEGRATING ORAL EVERY 30 MIN PRN
Status: DISCONTINUED | OUTPATIENT
Start: 2018-04-05 | End: 2018-04-07 | Stop reason: HOSPADM

## 2018-04-05 RX ORDER — FENTANYL CITRATE 50 UG/ML
25-50 INJECTION, SOLUTION INTRAMUSCULAR; INTRAVENOUS EVERY 5 MIN PRN
Status: DISCONTINUED | OUTPATIENT
Start: 2018-04-05 | End: 2018-04-05 | Stop reason: HOSPADM

## 2018-04-05 RX ORDER — ONDANSETRON 2 MG/ML
4 INJECTION INTRAMUSCULAR; INTRAVENOUS EVERY 30 MIN PRN
Status: DISCONTINUED | OUTPATIENT
Start: 2018-04-05 | End: 2018-04-05

## 2018-04-05 RX ORDER — ACETAMINOPHEN 325 MG/1
650 TABLET ORAL EVERY 4 HOURS PRN
Status: DISCONTINUED | OUTPATIENT
Start: 2018-04-08 | End: 2018-04-07 | Stop reason: HOSPADM

## 2018-04-05 RX ORDER — DIPHENHYDRAMINE HYDROCHLORIDE 50 MG/ML
25 INJECTION INTRAMUSCULAR; INTRAVENOUS EVERY 6 HOURS PRN
Status: DISCONTINUED | OUTPATIENT
Start: 2018-04-05 | End: 2018-04-07 | Stop reason: HOSPADM

## 2018-04-05 RX ORDER — CEFAZOLIN SODIUM 2 G/100ML
2 INJECTION, SOLUTION INTRAVENOUS
Status: COMPLETED | OUTPATIENT
Start: 2018-04-05 | End: 2018-04-05

## 2018-04-05 RX ORDER — HYDROMORPHONE HYDROCHLORIDE 1 MG/ML
.3-.5 INJECTION, SOLUTION INTRAMUSCULAR; INTRAVENOUS; SUBCUTANEOUS EVERY 30 MIN PRN
Status: DISCONTINUED | OUTPATIENT
Start: 2018-04-05 | End: 2018-04-07 | Stop reason: HOSPADM

## 2018-04-05 RX ORDER — METOPROLOL TARTRATE 1 MG/ML
1-2 INJECTION, SOLUTION INTRAVENOUS EVERY 5 MIN PRN
Status: DISCONTINUED | OUTPATIENT
Start: 2018-04-05 | End: 2018-04-05 | Stop reason: HOSPADM

## 2018-04-05 RX ADMIN — KETOROLAC TROMETHAMINE 15 MG: 15 INJECTION, SOLUTION INTRAMUSCULAR; INTRAVENOUS at 18:28

## 2018-04-05 RX ADMIN — OXYTOCIN-SODIUM CHLORIDE 0.9% IV SOLN 30 UNIT/500ML 100 ML: 30-0.9/5 SOLUTION at 10:00

## 2018-04-05 RX ADMIN — OXYTOCIN-SODIUM CHLORIDE 0.9% IV SOLN 30 UNIT/500ML 100 ML: 30-0.9/5 SOLUTION at 09:35

## 2018-04-05 RX ADMIN — PROCHLORPERAZINE EDISYLATE 10 MG: 5 INJECTION INTRAMUSCULAR; INTRAVENOUS at 14:29

## 2018-04-05 RX ADMIN — SODIUM CHLORIDE, POTASSIUM CHLORIDE, SODIUM LACTATE AND CALCIUM CHLORIDE 1000 ML: 600; 310; 30; 20 INJECTION, SOLUTION INTRAVENOUS at 08:15

## 2018-04-05 RX ADMIN — ONDANSETRON 4 MG: 2 INJECTION INTRAMUSCULAR; INTRAVENOUS at 11:54

## 2018-04-05 RX ADMIN — KETOROLAC TROMETHAMINE 15 MG: 15 INJECTION, SOLUTION INTRAMUSCULAR; INTRAVENOUS at 23:49

## 2018-04-05 RX ADMIN — ACETAMINOPHEN 975 MG: 325 TABLET, FILM COATED ORAL at 23:08

## 2018-04-05 RX ADMIN — SENNOSIDES AND DOCUSATE SODIUM 1 TABLET: 8.6; 5 TABLET ORAL at 23:09

## 2018-04-05 RX ADMIN — SODIUM CHLORIDE, POTASSIUM CHLORIDE, SODIUM LACTATE AND CALCIUM CHLORIDE: 600; 310; 30; 20 INJECTION, SOLUTION INTRAVENOUS at 15:49

## 2018-04-05 RX ADMIN — SODIUM CHLORIDE, POTASSIUM CHLORIDE, SODIUM LACTATE AND CALCIUM CHLORIDE: 600; 310; 30; 20 INJECTION, SOLUTION INTRAVENOUS at 08:53

## 2018-04-05 RX ADMIN — EPHEDRINE SULFATE 10 MG: 50 INJECTION, SOLUTION INTRAVENOUS at 09:19

## 2018-04-05 RX ADMIN — EPHEDRINE SULFATE 5 MG: 50 INJECTION, SOLUTION INTRAVENOUS at 09:26

## 2018-04-05 RX ADMIN — SODIUM CITRATE AND CITRIC ACID MONOHYDRATE 30 ML: 500; 334 SOLUTION ORAL at 08:42

## 2018-04-05 RX ADMIN — OXYTOCIN-SODIUM CHLORIDE 0.9% IV SOLN 30 UNIT/500ML 100 ML: 30-0.9/5 SOLUTION at 10:09

## 2018-04-05 RX ADMIN — SODIUM CHLORIDE, POTASSIUM CHLORIDE, SODIUM LACTATE AND CALCIUM CHLORIDE: 600; 310; 30; 20 INJECTION, SOLUTION INTRAVENOUS at 09:58

## 2018-04-05 RX ADMIN — CEFAZOLIN SODIUM 2 G: 2 INJECTION, SOLUTION INTRAVENOUS at 09:06

## 2018-04-05 RX ADMIN — SODIUM CHLORIDE, POTASSIUM CHLORIDE, SODIUM LACTATE AND CALCIUM CHLORIDE: 600; 310; 30; 20 INJECTION, SOLUTION INTRAVENOUS at 09:06

## 2018-04-05 NOTE — IP AVS SNAPSHOT
[facility] Postpartum    [address]    [facility] [address]    Phone:  [phone]    Fax:  [phone]                                       After Visit Summary   4/5/2018    Lucho Lugo    MRN: [id number]           After Visit Summary Signature Page     I have received my discharge instructions, and my questions have been answered. I have discussed any challenges I see with this plan with the nurse or doctor.    ..........................................................................................................................................  Patient/Patient Representative Signature      ..........................................................................................................................................  Patient Representative Print Name and Relationship to Patient    ..................................................               ................................................  Date                                            Time    ..........................................................................................................................................  Reviewed by Signature/Title    ...................................................              ..............................................  Date                                                            Time

## 2018-04-05 NOTE — ANESTHESIA PROCEDURE NOTES
Peripheral nerve/Neuraxial procedure note : intrathecal  Pre-Procedure  Performed by JUDAH CASTILLO  Referred by LIBIA  Location: OB, OR      Pre-Anesthestic Checklist: patient identified, IV checked, risks and benefits discussed, informed consent, monitors and equipment checked, pre-op evaluation and at physician/surgeon's request    Timeout  Correct Patient: Yes   Correct Procedure: Yes   Correct Site: Yes   Correct Laterality: N/A   Correct Position: Yes   Site Marked: N/A   .   Procedure Documentation  ASA 3  Diagnosis:repeat cs.    Procedure:    Intrathecal.  Insertion Site:L3-4  (midline approach)      Patient Prep;mask, sterile gloves, povidone-iodine 7.5% surgical scrub, patient draped.  .  Needle: Kaushik tip Spinal Needle (gauge): 25  Spinal/LP Needle Length (inches): 3.5 # of attempts: 1 and # of redirects:  Introducer used .       Assessment/Narrative  Paresthesias: No.  .  .  0.01 mL of clear CSF fluid removed . Comments:  2cc .75marc and 0.2mg duramorph

## 2018-04-05 NOTE — ANESTHESIA CARE TRANSFER NOTE
Patient: Lucho Lugo    Procedure(s):  Repeat  Section  - Wound Class: II-Clean Contaminated    Diagnosis: Previous   Diagnosis Additional Information: No value filed.    Anesthesia Type:   Spinal     Note:  Airway :Room Air  Patient transferred to:Labor and Delivery  Comments: VSS. Report to RN.      Vitals: (Last set prior to Anesthesia Care Transfer)    CRNA VITALS  2018 0940 - 2018 1013      2018             SpO2: 100 %                Electronically Signed By: LUIS Cox CRNA  2018  10:13 AM

## 2018-04-05 NOTE — PLAN OF CARE
Dr. Gudino and [facility] notified of patient's orientation status. Nurse was called into room because patient was vomiting. Patient had just drank juice earlier due to BG of 67. BG rechecked was 84 and 87. Patient hard to respond to verbal stimuli. Only responds to tactile stimuli with help from . Lethargic, sweaty, did report blurry vision and shaky. Blood sugar log from home reviewed. Normal AM fasting 70's. [facility] ordered Hgb and arterial gases stat. Other VSS stable besides a low temp of 96F. Andre hugger in place. BG recheck was 102. Patient did become more oriented after some time, however still remains lethargic from time to time. Hgb and arterial gases stable. No new orders at this time. Continue to monitor.

## 2018-04-05 NOTE — OP NOTE
[facility]   Operative Note     Date of Procedure: 18    Preoperative diagnosis: 39w1d gestation, history of  section x2, GDM A2 on insulin, non English speaking    Post operative diagnosis: same, dense adhesions of anterior uterine wall to fascia    Procedure: scheduled repeat low transverse  section with double layer closure of uterine incision    Surgeon: Elizabeth Gudino MD  Assistant surgeon: Raúl King MD    Anesthesia: Spinal    Indication: Lucho is a 32 year old  who presented at 39w1d gestation with history of two prior  sections for a planned/scheduled repeat . Pregnancy complicated by GDM A2, non english speaking.     EBL: 1500 mL    Specimens: placenta, fetal cord blood    Findings: viable, cephalic, female infant. 6 lb, 2 oz. Face up/OP position. Agars of 8 at 1 minute and 9 at 5 minutes. Adhesion of anterior abdominal wall to fascia, omentum adhesions to superior anterior abdominal wall and across fundus as well as obscuring visualization of either tube or ovaries.     Procedure: Patient was taken to the operating room. Anesthesia was dosed and found to be adequate. Patient was prepared and draped in the normal sterile fashion in the supine position with a leftward tilt. Surgical time out was performed. Anesthesia relief was confirmed. David Gutiérrez skin incision was made with the scalpel and carried through to the underlying layer of fascia with the Bovie. Fascia was incised in the midline and this incision was extended laterally with Hess scissors. Rectus muscles were seperated in the midline with peritoneum visualized. Anterior aspect of fascial incision was grasp with Kochers and elevated upwards with underlying rectus muscles dissected off with Hess scissors. Inferior aspect of this incision was grasp with Kochers, elevated upward and underlying muscles were dissected off with Hess scissors. Peritoneum grasp with hemostats and  elevated upward. It was entered sharply with Hess scissors but complete entry was not achieved. Digit was then used to enter bluntly. Anterior uterine wall was adhesed to the fascia and lower uterine segment was entered with the digit incidentally. This entry was superior to planned uterine incision. There was bleeding from this area. The uterine wall was attempted to be palpated/inspected with the hand in the abdomen with extensive adhesions encountered. Bladder blade was inserted and vesicouterine peritoneum was inspected. No bladder flap was created. Lower uterine segment was incised in a transverse fashion with the scalpel. This incision was continued to the uterine cavity and the uterine cavity was entered with the digit. Uterine incision was extended with caudal-cephalad traction. Amniotomy was performed. Viable infant was delivered atraumatically from a cephalic position. Nose and mouth were suctioned with bulb suctioning. Cord was clamped and cut. Infant was handed off the the waiting nursing staff. Placenta was delivered spontaneously and sent to pathology. Uterus was attempted to be exteriorized but adhesions prevented this. It was returned to the abdomen and wiped clean of all clot and debris with a moist sponge. Uterine incision was closed with 0-Vicryl in a running fashion. Uterine defect created by the digit was closed with figure of eight suture with 0-Vicryl. An imbricating second layer was created with 0-Vicryl. Anterior uterine wall with patchy oozing at site of adhesions was imbricated with 2-0 vicryl. Inspection of the uterine incision demonstrated hemostasis after two additional figure of eight sutures with 0-Vicryl were placed. The bladder blade was reinserted and prolonged inpsection of the uterine incision again demonstrated hemostasis with use of electrocautery along the right anterior abdominal wall. The fascia adhered to the anterior abdominal wall in the right lateral anterior area of the  uterus was not further dissected. The rectus muscles were inspected and noted to be hemostatic with use of electrocautery. The fascia was re-approximated with 0-Vicryl in a running fashion. Subcutaneous space was inspected and hemostasis was achieved with electrocautery. Subcutaneous space was re approximated with 2-0 Vicryl. Skin was closed with staples. Sponge, lap and needle counts were correct times two as counted and reported by the nursing staff.

## 2018-04-05 NOTE — PLAN OF CARE
Data: Lucho Lugo transferred to 452 via cart at 1345. Baby transferred via crib.  Action: Receiving unit notified of transfer: Yes. Patient and family notified of room change. Report given to Ольга NAVARRO at 1515. Belongings sent to receiving unit. Accompanied by Registered Nurse. Oriented patient to surroundings. Call light within reach. ID bands double-checked with receiving RN.  Response: Patient tolerated transfer and is stable.

## 2018-04-05 NOTE — PROVIDER NOTIFICATION
04/05/18 1159   Provider Notification   Provider Name/Title Dr Gudino   Method of Notification Phone   Notification Reason Status Update   Updated re: pt blood sugar 67, received juice and blood sugar back to 80's. Vitals stable. Pt lethargic but does respond to verbal stimuli, minimal bleeding in recovery. No further orders received at this time,  plans to see patient soon.

## 2018-04-05 NOTE — ANESTHESIA POSTPROCEDURE EVALUATION
Patient: Lucho Lugo    Procedure(s):  Repeat  Section  - Wound Class: II-Clean Contaminated    Diagnosis:Previous   Diagnosis Additional Information: Date of Procedure: 18     Preoperative diagnosis: 39w1d gestation, history of  section x2, GDM A2 on insulin, non English speaking     Post operative diagnosis: same, dense adhesions of anterior uterine wall to fascia     Procedure: baldomero, eduled repeat low transverse  section with double layer closure of uterine incision     Surgeon: Elizabeth Gudino MD  Assistant surgeon: Raúl King MD     Anesthesia: Spinal       Anesthesia Type:  Spinal    Note:  Anesthesia Post Evaluation    Patient location during evaluation: PACU  Patient participation: Able to fully participate in evaluation  Level of consciousness: awake  Pain management: adequate  Airway patency: patent  Cardiovascular status: acceptable  Respiratory status: acceptable  Hydration status: acceptable  PONV: none     Anesthetic complications: None          Last vitals:  Vitals:    18 1147 18 1204 18 1219   BP: 105/62 106/57 104/59   Resp:  16 16   Temp: 96.9  F (36.1  C) 96.7  F (35.9  C) 96.6  F (35.9  C)   SpO2: 100%           Electronically Signed By: Hector Wilson MD  2018  12:33 PM

## 2018-04-05 NOTE — PLAN OF CARE
Patient here for repeat C/S. Discussed plan for fetal monitoring, POC prior to C/S and during c/s. Patient agreeable to POC. Patient ESL, understands more english then can speak, did discuss attempt to have  present for care but  services was unable to locate  of patient language/dialect, will utilize phone if needed and attempt to schedule  for remainder of stay, patient's  is able to interpret if needed.

## 2018-04-05 NOTE — IP AVS SNAPSHOT
MRN:[id number]                      After Visit Summary   4/5/2018    Lucho Lugo    MRN: [id number]           Thank you!     Thank you for choosing [facility] for your care. Our goal is always to provide you with excellent care. Hearing back from our patients is one way we can continue to improve our services. Please take a few minutes to complete the written survey that you may receive in the mail after you visit. If you would like to speak to someone directly about your visit please contact Patient Relations at [phone]. Thank you!          Patient Information     Date Of Birth          1985        Designated Caregiver       Most Recent Value    Caregiver    Will someone help with your care after discharge? no      About your hospital stay     You were admitted on:  April 5, 2018 You last received care in the:  [facility]    You were discharged on:  April 7, 2018        Reason for your hospital stay       Maternity care                  Who to Call     For medical emergencies, please call 911.  For non-urgent questions about your medical care, please call your primary care provider or clinic, [phone]  For questions related to your surgery, please call your surgery clinic        Attending Provider     Provider Specialty    Elizabeth Gudino MD OB/Gyn       Primary Care Provider Office Phone # Fax #    Adelina Aguilera [phone]      After Care Instructions     Activity       Review discharge instructions            Diet       Resume previous diet            Discharge Instructions - Gestational diabetic patients       Gestational diabetic patients to follow up for fasting blood sugar and 2 hour 75gm glucose load at 6 weeks postpartum.            Discharge Instructions - Postpartum visit       Schedule postpartum visit with your provider and return to clinic in 6 weeks.                  Further instructions from  your care team       Postop  Birth Instructions    -follow up with OB in 6 weeks  -lactation: [phone]  -Home care: [phone]    Activity       Do not lift more than 10 pounds for 6 weeks after surgery.  Ask family and friends for help when you need it.    No driving until you have stopped taking your pain medications (usually two weeks after surgery).    No heavy exercise or activity for 6 weeks.  Don't do anything that will put a strain on your surgery site.    Don't strain when using the toilet.  Your care team may prescribe a stool softener if you have problems with your bowel movements.     To care for your incision:       Keep the incision clean and dry.    Do not soak your incision in water. No swimming or hot tubs until it has fully healed. You may soak in the bathtub if the water level is below your incision.    Do not use peroxide, gel, cream, lotion, or ointment on your incision.    Adjust your clothes to avoid pressure on your surgery site (check the elastic in your underwear for example).     You may see a small amount of clear or pink drainage and this is normal.  Check with your health care provider:       If the drainage increases or has an odor.    If the incision reddens, you have swelling, or develop a rash.    If you have increased pain and the medicine we prescribed doesn't help.    If you have a fever above 100.4 F (38 C) with or without chills when placing thermometer under your tongue.   The area around your incision (surgery wound), will feel numb.  This is normal. The numbness should go away in less than a year.     Keep your hands clean:  Always wash your hands before touching your incision (surgery wound). This helps reduce your risk of infection. If your hands aren't dirty, you may use an alcohol hand-rub to clean your hands. Keep your nails clean and short.    Call your healthcare provider if you have any of these symptoms:       You soak a sanitary pad with blood within  "1 hour, or you see blood clots larger than a golf ball.    Bleeding that lasts more than 6 weeks.    Vaginal discharge that smells bad.    Severe pain, cramping or tenderness in your lower belly area.    A need to urinate more frequently (use the toilet more often), more urgently (use the toilet very quickly), or it burns when you urinate.    Nausea and vomiting.    Redness, swelling or pain around a vein in your leg.    Problems breastfeeding or a red or painful area on your breast.    Chest pain and cough or are gasping for air.    Problems with coping with sadness, anxiety or depression. If you have concerns about hurting yourself or the baby, call your provider immediately.      You have questions or concerns after you return home.                  Pending Results     No orders found from 4/3/2018 to 4/6/2018.            Statement of Approval     Ordered          04/07/18 1134  I have reviewed and agree with all the recommendations and orders detailed in this document.  EFFECTIVE NOW     Approved and electronically signed by:  Zuly Sims DO             Admission Information     Date & Time Provider Department Dept. Phone    4/5/2018 Elizabeth Gudino MD [facility] [phone]      Your Vitals Were     Blood Pressure Temperature Respirations Height Weight Last Period    119/73 97.7  F (36.5  C) (Oral) 16 1.626 m (5' 4\") 67.1 kg (148 lb) 07/15/2017    Pulse Oximetry BMI (Body Mass Index)                100% 25.4 kg/m2          MyChart Information     [employer] lets you send messages to your doctor, view your test results, renew your prescriptions, schedule appointments and more. To sign up, go to [email or web address] . Click on \"Log in\" on the left side of the screen, which will take you to the Welcome page. Then click on \"Sign up Now\" on the right side of the page.     You will be asked to enter the access code listed below, as well as some personal information. Please " follow the directions to create your username and password.     Your access code is: [email or web address]  Expires: 2018 12:18 PM     Your access code will  in 90 days. If you need help or a new code, please call your [place] clinic or [phone].        Care EveryWhere ID     This is your Care EveryWhere ID. This could be used by other organizations to access your [place] medical records  [id number]        Equal Access to Services     MARLIN LEWIS : Hadii ana rodriguez hadasho Soomaali, waaxda luqadaha, qaybta kaalmada adeegyada, waxdarwin carson. So [facility] [phone].    ATENCIÓN: Si habla español, tiene a haas disposición servicios gratuitos de asistencia lingüística. Llame al [phone].    We comply with applicable federal civil rights laws and [place] laws. We do not discriminate on the basis of race, color, national origin, age, disability, sex, sexual orientation, or gender identity.               Review of your medicines      START taking        Dose / Directions    acetaminophen 325 MG tablet   Commonly known as:  TYLENOL   Used for:  S/P  section        Dose:  325-650 mg   Start taking on:  2018   Take 1-2 tablets (325-650 mg) by mouth every 4 hours as needed for fever or pain   Quantity:  30 tablet   Refills:  0       ibuprofen 800 MG tablet   Commonly known as:  ADVIL/MOTRIN   Used for:  S/P  section        Dose:  800 mg   Take 1 tablet (800 mg) by mouth every 6 hours as needed for other (cramping)   Quantity:  30 tablet   Refills:  0       oxyCODONE IR 5 MG tablet   Commonly known as:  ROXICODONE   Used for:  S/P  section        Dose:  5-10 mg   Take 1-2 tablets (5-10 mg) by mouth every 3 hours as needed for other (pain control or improvement in physical function. Hold dose for analgesic side effects.)   Quantity:  20 tablet   Refills:  0       senna-docusate 8.6-50 MG per tablet   Commonly known as:  SENOKOT-S;PERICOLACE   Used for:  S/P   section        Dose:  1-2 tablet   Take 1-2 tablets by mouth 2 times daily as needed for constipation   Quantity:  30 tablet   Refills:  0         CONTINUE these medicines which have NOT CHANGED        Dose / Directions    prenatal multivitamin plus iron 27-0.8 MG Tabs per tablet        Dose:  1 tablet   Take 1 tablet by mouth daily   Refills:  0         STOP taking     INSULIN NPH (HUMAN) (ISOPHANE) SC           insulin regular 100 UNIT/ML injection   Commonly known as:  HumuLIN R/NovoLIN R                Where to get your medicines      These medications were sent to [place], [place] - [address] [address] [facility]  [address] [facility] [address]     Phone:  [phone]     acetaminophen 325 MG tablet    ibuprofen 800 MG tablet    senna-docusate 8.6-50 MG per tablet         Some of these will need a paper prescription and others can be bought over the counter. Ask your nurse if you have questions.     Bring a paper prescription for each of these medications     oxyCODONE IR 5 MG tablet                Protect others around you: Learn how to safely use, store and throw away your medicines at www.disposemymeds.org.        Information about OPIOIDS     PRESCRIPTION OPIOIDS: WHAT YOU NEED TO KNOW    Prescription opioids can be used to help relieve moderate to severe pain and are often prescribed following a surgery or injury, or for certain health conditions. These medications can be an important part of treatment but also come with serious risks. It is important to work with your health care provider to make sure you are getting the safest, most effective care.    WHAT ARE THE RISKS AND SIDE EFFECTS OF OPIOID USE?  Prescription opioids carry serious risks of addiction and overdose, especially with prolonged use. An opioid overdose, often marked by slowed breathing can cause sudden death. The use of prescription opioids can have a number of side effects as well, even when taken as  directed:      Tolerance - meaning you might need to take more of a medication for the same pain relief    Physical dependence - meaning you have symptoms of withdrawal when a medication is stopped    Increased sensitivity to pain    Constipation    Nausea, vomiting, and dry mouth    Sleepiness and dizziness    Confusion    Depression    Low levels of testosterone that can result in lower sex drive, energy, and strength    Itching and sweating    RISKS ARE GREATER WITH:    History of drug misuse, substance use disorder, or overdose    Mental health conditions (such as depression or anxiety)    Sleep apnea    Older age (65 years or older)    Pregnancy    Avoid alcohol while taking prescription opioids.   Also, unless specifically advised by your health care provider, medications to avoid include:    Benzodiazepines (such as Xanax or Valium)    Muscle relaxants (such as Soma or Flexeril)    Hypnotics (such as Ambien or Lunesta)    Other prescription opioids    KNOW YOUR OPTIONS:  Talk to your health care provider about ways to manage your pain that do not involve prescription opioids. Some of these options may actually work better and have fewer risks and side effects:    Pain relievers such as acetaminophen, ibuprofen, and naproxen    Some medications that are also used for depression or seizures    Physical therapy and exercise    Cognitive behavioral therapy, a psychological, goal-directed approach, in which patients learn how to modify physical, behavioral, and emotional triggers of pain and stress    IF YOU ARE PRESCRIBED OPIOIDS FOR PAIN:    Never take opioids in greater amounts or more often than prescribed    Follow up with your primary health care provider and work together to create a plan on how to manage your pain.    Talk about ways to help manage your pain that do not involve prescription opioids    Talk about all concerns and side effects    Help prevent misuse and abuse    Never sell or share  prescription opioids    Never use another person's prescription opioids    Store prescription opioids in a secure place and out of reach of others (this may include visitors, children, friends, and family)    Visit www.cdc.gov/drugoverdose to learn about risks of opioid abuse and overdose    If you believe you may be struggling with addiction, tell your health care provider and ask for guidance or call [facility]'s National Helpline at 9-919-676-HELP    LEARN MORE / www.cdc.gov/drugoverdose/prescribing/guideline.html    Safely dispose of unused prescription opioids: Find your local drug take-back programs and more information about the importance of safe disposal at www.doseofreality.[place].gov             Medication List: This is a list of all your medications and when to take them. Check marks below indicate your daily home schedule. Keep this list as a reference.      Medications           Morning Afternoon Evening Bedtime As Needed    acetaminophen 325 MG tablet   Commonly known as:  TYLENOL   Take 1-2 tablets (325-650 mg) by mouth every 4 hours as needed for fever or pain   Start taking on:  4/8/2018   Last time this was given:  975 mg on 4/7/2018  6:12 AM                                ibuprofen 800 MG tablet   Commonly known as:  ADVIL/MOTRIN   Take 1 tablet (800 mg) by mouth every 6 hours as needed for other (cramping)   Last time this was given:  800 mg on 4/7/2018  7:51 AM                                oxyCODONE IR 5 MG tablet   Commonly known as:  ROXICODONE   Take 1-2 tablets (5-10 mg) by mouth every 3 hours as needed for other (pain control or improvement in physical function. Hold dose for analgesic side effects.)   Last time this was given:  5 mg on 4/7/2018  7:51 AM                                prenatal multivitamin plus iron 27-0.8 MG Tabs per tablet   Take 1 tablet by mouth daily                                senna-docusate 8.6-50 MG per tablet   Commonly known as:  SENOKOT-S;PERICOLACE   Take 1-2  tablets by mouth 2 times daily as needed for constipation   Last time this was given:  1 tablet on 4/6/2018  6:45 PM

## 2018-04-05 NOTE — ANESTHESIA PREPROCEDURE EVALUATION
PAC NOTE:       ANESTHESIA PRE EVALUATION:  Anesthesia Evaluation     . Pt has had prior anesthetic. Type: General and Regional    No history of anesthetic complications          ROS/MED HX    ENT/Pulmonary:  - neg pulmonary ROS     Neurologic:       Cardiovascular:  - neg cardiovascular ROS       METS/Exercise Tolerance:     Hematologic:     (+) Other Hematologic Disorder (h/o gestational thrombocytopenia)-      Musculoskeletal:         GI/Hepatic:         Renal/Genitourinary:         Endo: Comment: Gestational diabetes        Psychiatric:         Infectious Disease:         Malignancy:         Other:                     Physical Exam      Airway   Mallampati: II  TM distance: >3 FB  Neck ROM: full    Dental     Cardiovascular   Rhythm and rate: regular and normal      Pulmonary    breath sounds clear to auscultation    Other findings: Lab Test        04/02/18 08/23/17 08/20/16 02/03/16      --          06/29/15                       1245          1314          2035          0631           --           1000          WBC           --          7.8          9.4           --           --          9.4           HGB          13.3         12.0         13.4         10.7*          < >        12.0          MCV           --          83           82            --           --          75*           PLT           --          184          203          97*            < >        191            < > = values in this interval not displayed.                  Lab Test        08/20/16     02/03/16     06/29/15                       2035          1158          1000          NA           139           --          135           POTASSIUM    4.0           --          3.3*          CHLORIDE     104           --          102           CO2          27            --          25            BUN          7             --          6*            CR           0.67         0.84         0.58          ANIONGAP     8             --          8              SONDRA          8.8           --          9.0           [facility]          124*          --          82                   Anesthesia Plan      History & Physical Review  History and physical reviewed and following examination; no interval change.    ASA Status:  3 .    NPO Status:  > 8 hours    Plan for Spinal (will check plt count)   PONV prophylaxis:  Ondansetron (or other 5HT-3)       Postoperative Care  Postoperative pain management:  IV analgesics, Oral pain medications, Neuraxial analgesia and Multi-modal analgesia.      Consents  Anesthetic plan, risks, benefits and alternatives discussed with:  Patient..                            .

## 2018-04-06 PROBLEM — O34.219 PREVIOUS CESAREAN DELIVERY, ANTEPARTUM CONDITION OR COMPLICATION: Status: ACTIVE | Noted: 2018-04-06

## 2018-04-06 PROBLEM — O24.414 GESTATIONAL DIABETES REQUIRING INSULIN: Status: ACTIVE | Noted: 2018-04-06

## 2018-04-06 LAB
COPATH REPORT: NORMAL
GLUCOSE BLDC GLUCOMTR-MCNC: 112 MG/DL (ref 70–99)
GLUCOSE BLDC GLUCOMTR-MCNC: 150 MG/DL (ref 70–99)
GLUCOSE BLDC GLUCOMTR-MCNC: 174 MG/DL (ref 70–99)
HGB BLD-MCNC: 11.2 G/DL (ref 11.7–15.7)

## 2018-04-06 PROCEDURE — 36415 COLL VENOUS BLD VENIPUNCTURE: CPT | Performed by: OBSTETRICS & GYNECOLOGY

## 2018-04-06 PROCEDURE — 25000128 H RX IP 250 OP 636: Performed by: OBSTETRICS & GYNECOLOGY

## 2018-04-06 PROCEDURE — 00000146 ZZHCL STATISTIC GLUCOSE BY METER IP

## 2018-04-06 PROCEDURE — 25000132 ZZH RX MED GY IP 250 OP 250 PS 637: Performed by: OBSTETRICS & GYNECOLOGY

## 2018-04-06 PROCEDURE — 12000027 ZZH R&B OB

## 2018-04-06 PROCEDURE — 85018 HEMOGLOBIN: CPT | Performed by: OBSTETRICS & GYNECOLOGY

## 2018-04-06 RX ADMIN — SENNOSIDES AND DOCUSATE SODIUM 1 TABLET: 8.6; 5 TABLET ORAL at 12:22

## 2018-04-06 RX ADMIN — SENNOSIDES AND DOCUSATE SODIUM 1 TABLET: 8.6; 5 TABLET ORAL at 18:45

## 2018-04-06 RX ADMIN — ACETAMINOPHEN 975 MG: 325 TABLET, FILM COATED ORAL at 06:31

## 2018-04-06 RX ADMIN — IBUPROFEN 800 MG: 800 TABLET, FILM COATED ORAL at 18:45

## 2018-04-06 RX ADMIN — ACETAMINOPHEN 975 MG: 325 TABLET, FILM COATED ORAL at 14:39

## 2018-04-06 RX ADMIN — ACETAMINOPHEN 975 MG: 325 TABLET, FILM COATED ORAL at 22:29

## 2018-04-06 RX ADMIN — KETOROLAC TROMETHAMINE 15 MG: 15 INJECTION, SOLUTION INTRAMUSCULAR; INTRAVENOUS at 06:31

## 2018-04-06 RX ADMIN — IBUPROFEN 800 MG: 800 TABLET, FILM COATED ORAL at 12:22

## 2018-04-06 NOTE — PROGRESS NOTES
"Post op day 1, s/p  section    SUBJECTIVE:  Sitting and standing this morning.  No complaints.  Ordered breakfast.  Voiding well. Pain controlled.  EBL 1500 cc but hgb this morning 11.2.  Preop was 12.1.    Pregnancy complicated by GDMA2 and previous c/s x 2.    OBJECTIVE:  Blood pressure 99/58, temperature 97.9  F (36.6  C), temperature source Axillary, resp. rate 16, height 1.626 m (5' 4\"), weight 67.1 kg (148 lb), last menstrual period 07/15/2017, SpO2 100 %, unknown if currently breastfeeding.    WDWN woman in NAD  FF at U -1  Incision CDI    Hemoglobin   Date Value Ref Range Status   2018 11.2 (L) 11.7 - 15.7 g/dL Final   2018 12.1 11.7 - 15.7 g/dL Final   ]    ASSESSMENT:  S/p  delivery, POD # 1 s/p repeat c/s (#3) doing well.  GDMA2.  EBL 1500 but hag 11/2, VSS, asymptomatic.  Doing well.  Active Problems:    Indication for care in labor and delivery, delivered (2018)      PLAN:  Routine post op cares, post partum cares, education and support.  Anticipate DC 1-2 days.  She hopes for DC tomorrow.    Araseli Abreu MD 2018     "

## 2018-04-06 NOTE — PLAN OF CARE
"Problem: Postpartum ( Delivery) (Adult,Obstetrics,Pediatric)  Goal: Signs and Symptoms of Listed Potential Problems Will be Absent, Minimized or Managed (Postpartum)  Signs and symptoms of listed potential problems will be absent, minimized or managed by discharge/transition of care (reference Postpartum ( Delivery) (Adult,Obstetrics,Pediatric) CPG).   Outcome: Improving  Patient is here with her  and two children.  They are requesting an early discharge.  Discussed with them to talk to the OB and Peds regarding early d/c.  [place] and IV in am.  Mom doesn't want to wear SCDs, stated\" they are too ichy\".  She is moving around in bed and standing at bedside.  Discussed the importance of SCDs but mom still refuses to wear them.  Patient is eating and drinking.      "

## 2018-04-06 NOTE — PLAN OF CARE
Problem: Patient Care Overview  Goal: Plan of Care/Patient Progress Review  Outcome: Improving  Pt up with 1 assist at bedside  Montero cath patent . Incision dry and intact drainage marked no change. Reports adequate pain control with current pain plan. Family present and supportive. Mother very sleepy for the first half of shift. C/O nausea and dizziness. Rested and was able to tolerate a popsicle  and some sips of water this evening  Meeting expected goals slowly. Continues on IV fluids infant down to nursery when family no present.

## 2018-04-06 NOTE — PLAN OF CARE
Problem: Patient Care Overview  Goal: Plan of Care/Patient Progress Review  Outcome: Improving  VSS. Up ad [place], showered this AM. Incision TRISTEN, well-approximated. Pain managed with tylenol, oxycodone and ibuprofen. BG pre-breakfast 115, pre-lunch patient did not call for BG prior to eating. Reminded patient to call prior to eating meals. PIV removed. Tolerating regular diet, voiding spontaneously. All questions and concerns answered at this time while  was present and able to interpret, otherwise blue phone can be utilized.

## 2018-04-06 NOTE — PLAN OF CARE
Problem: Patient Care Overview  Goal: Plan of Care/Patient Progress Review  Outcome: No Change  Pt up ambulating independently. Voiding without difficulty. Incision CDI with staples . Reports adequate pain control with current pain plan c/o some burning declined any additional interventions. Family present and supportive. Meeting expected goals. Mother attentive to infants needs. Formula feeding infant plans to breast feed after 1 week.

## 2018-04-07 VITALS
TEMPERATURE: 97.7 F | WEIGHT: 148 LBS | OXYGEN SATURATION: 100 % | HEIGHT: 64 IN | BODY MASS INDEX: 25.27 KG/M2 | SYSTOLIC BLOOD PRESSURE: 119 MMHG | RESPIRATION RATE: 16 BRPM | DIASTOLIC BLOOD PRESSURE: 73 MMHG

## 2018-04-07 LAB
GLUCOSE BLDC GLUCOMTR-MCNC: 147 MG/DL (ref 70–99)
GLUCOSE BLDC GLUCOMTR-MCNC: 90 MG/DL (ref 70–99)

## 2018-04-07 PROCEDURE — 00000146 ZZHCL STATISTIC GLUCOSE BY METER IP

## 2018-04-07 PROCEDURE — 25000132 ZZH RX MED GY IP 250 OP 250 PS 637: Performed by: OBSTETRICS & GYNECOLOGY

## 2018-04-07 RX ORDER — IBUPROFEN 800 MG/1
800 TABLET, FILM COATED ORAL EVERY 6 HOURS PRN
Qty: 30 TABLET | Refills: 0 | Status: SHIPPED | OUTPATIENT
Start: 2018-04-07 | End: 2020-07-23

## 2018-04-07 RX ORDER — AMOXICILLIN 250 MG
1-2 CAPSULE ORAL 2 TIMES DAILY PRN
Qty: 30 TABLET | Refills: 0 | Status: SHIPPED | OUTPATIENT
Start: 2018-04-07 | End: 2020-07-23

## 2018-04-07 RX ORDER — OXYCODONE HYDROCHLORIDE 5 MG/1
5-10 TABLET ORAL
Qty: 20 TABLET | Refills: 0 | Status: SHIPPED | OUTPATIENT
Start: 2018-04-07 | End: 2020-07-23

## 2018-04-07 RX ORDER — ACETAMINOPHEN 325 MG/1
325-650 TABLET ORAL EVERY 4 HOURS PRN
Qty: 30 TABLET | Refills: 0 | Status: SHIPPED | OUTPATIENT
Start: 2018-04-08 | End: 2020-07-23

## 2018-04-07 RX ADMIN — IBUPROFEN 800 MG: 800 TABLET, FILM COATED ORAL at 01:32

## 2018-04-07 RX ADMIN — IBUPROFEN 800 MG: 800 TABLET, FILM COATED ORAL at 07:51

## 2018-04-07 RX ADMIN — IBUPROFEN 800 MG: 800 TABLET, FILM COATED ORAL at 13:11

## 2018-04-07 RX ADMIN — ACETAMINOPHEN 975 MG: 325 TABLET, FILM COATED ORAL at 06:12

## 2018-04-07 RX ADMIN — OXYCODONE HYDROCHLORIDE 5 MG: 5 TABLET ORAL at 07:51

## 2018-04-07 NOTE — PROGRESS NOTES
[facility] Obstetrics Post-Op / Progress Note    Interval History   Doing well.  Pain is well-controlled.  No fevers.  No history of wound drainage, warmth or significant erythema.  Good appetite.  Denies chest pain, shortness of breath, nausea or vomiting.  Ambulatory.  Formula feedingwell.    Medications     lactated ringers       - MEDICATION INSTRUCTIONS -       oxytocin in 0.9% NaCl Stopped (18 1900)     oxytocin in 0.9% NaCl       lactated ringers 100 mL/hr at 18     NO Rho (D) immune globulin (RhoGam) needed - mother Rh POSITIVE         acetaminophen  975 mg Oral Q8H       Physical Exam   Temp: 97.7  F (36.5  C) Temp src: Oral BP: 119/73   Heart Rate: 70 Resp: 16        Vitals:    18 0726   Weight: 67.1 kg (148 lb)     Vital Signs with Ranges  Temp:  [97.6  F (36.4  C)-97.8  F (36.6  C)] 97.7  F (36.5  C)  Heart Rate:  [61-70] 70  Resp:  [16-18] 16  BP: (115-119)/(61-73) 119/73       Uterine fundus is firm, non-tender and at the level of the umbilicus  Incision C/D/I    Data   Recent Labs   Lab Test  18   1245   ABO  A   RH  Pos   AS  Neg     Recent Labs   Lab Test  18   0640  18   1207   HGB  11.2*  12.1     Recent Labs   Lab Test 17   RUQIGG  immune       Assessment & Plan   -2 Days Post-Op Procedure(s):  Repeat  Section  - Wound Class: II-Clean Contaminated   GDMA2    Doing well.  Clean wound without signs of infection.  Normal healing wound.  No immediate surgical complications identified.  No excessive bleeding  Pain well-controlled.  Ambulation encouraged  Reportable signs and symptoms dicussed with the patient  Staples to be removed and Steri-Strips placed today  Discharge later today    Zuly Sims DO

## 2018-04-07 NOTE — DISCHARGE INSTRUCTIONS
Postop  Birth Instructions    -follow up with OB in 6 weeks  -lactation: [phone]  -Home care: [phone]    Activity       Do not lift more than 10 pounds for 6 weeks after surgery.  Ask family and friends for help when you need it.    No driving until you have stopped taking your pain medications (usually two weeks after surgery).    No heavy exercise or activity for 6 weeks.  Don't do anything that will put a strain on your surgery site.    Don't strain when using the toilet.  Your care team may prescribe a stool softener if you have problems with your bowel movements.     To care for your incision:       Keep the incision clean and dry.    Do not soak your incision in water. No swimming or hot tubs until it has fully healed. You may soak in the bathtub if the water level is below your incision.    Do not use peroxide, gel, cream, lotion, or ointment on your incision.    Adjust your clothes to avoid pressure on your surgery site (check the elastic in your underwear for example).     You may see a small amount of clear or pink drainage and this is normal.  Check with your health care provider:       If the drainage increases or has an odor.    If the incision reddens, you have swelling, or develop a rash.    If you have increased pain and the medicine we prescribed doesn't help.    If you have a fever above 100.4 F (38 C) with or without chills when placing thermometer under your tongue.   The area around your incision (surgery wound), will feel numb.  This is normal. The numbness should go away in less than a year.     Keep your hands clean:  Always wash your hands before touching your incision (surgery wound). This helps reduce your risk of infection. If your hands aren't dirty, you may use an alcohol hand-rub to clean your hands. Keep your nails clean and short.    Call your healthcare provider if you have any of these symptoms:       You soak a sanitary pad with blood within 1 hour, or you see  blood clots larger than a golf ball.    Bleeding that lasts more than 6 weeks.    Vaginal discharge that smells bad.    Severe pain, cramping or tenderness in your lower belly area.    A need to urinate more frequently (use the toilet more often), more urgently (use the toilet very quickly), or it burns when you urinate.    Nausea and vomiting.    Redness, swelling or pain around a vein in your leg.    Problems breastfeeding or a red or painful area on your breast.    Chest pain and cough or are gasping for air.    Problems with coping with sadness, anxiety or depression. If you have concerns about hurting yourself or the baby, call your provider immediately.      You have questions or concerns after you return home.

## 2018-04-07 NOTE — DISCHARGE SUMMARY
[facility]    Discharge Summary  Obstetrics    Date of Admission:  2018  Date of Discharge:  2018  Discharging Provider: Zuly Sims MD  Date of Service (when I saw the patient): 18    Discharge Diagnoses   Repeat  section  GDMA2    Procedure/Surgery Information   Procedure: Procedure(s):  Repeat  Section  - Wound Class: II-Clean Contaminated   Surgeon(s): Surgeon(s) and Role:     * Elizabeth Gudino MD - Primary     * Raúl King MD     History of Present Illness   Lucho Lugo is a 32 year old female who presented for repeat  with history of GDMA2    Hospital Course   The patient's hospital course was unremarkable.  She recovered as anticipated and experienced no post-operative complications.  On discharge, her pain was well controlled.  Vaginal bleeding is similar to peak menstrual flow.  Voiding without difficulty.  Ambulating well and tolerating a normal diet.  No fever or significant wound drainage.  Bottle feeding well.  Infant is stable.  She was discharged on post-partum day #2.    Post-partum hemoglobin:   Hemoglobin   Date Value Ref Range Status   2018 11.2 (L) 11.7 - 15.7 g/dL Final       Zuly Sims DO    Discharge Disposition   Discharged to home   Condition at discharge: Stable    Pending Results   Final pathology results: No pathology submitted    Unresulted Labs Ordered in the Past 30 Days of this Admission     No orders found from 2018 to 2018.          Primary Care Physician   Adelina Aguilera    Consultations This Hospital Stay   HOME CARE POST PARTUM/ IP CONSULT  LACTATION IP CONSULT    Discharge Orders     Activity   Review discharge instructions     Reason for your hospital stay   Maternity care     Discharge Instructions - Postpartum visit   Schedule postpartum visit with your provider and return to clinic in 6 weeks.     Discharge Instructions - Gestational diabetic  patients   Gestational diabetic patients to follow up for fasting blood sugar and 2 hour 75gm glucose load at 6 weeks postpartum.     Diet   Resume previous diet       Discharge Medications   Current Discharge Medication List      START taking these medications    Details   oxyCODONE IR (ROXICODONE) 5 MG tablet Take 1-2 tablets (5-10 mg) by mouth every 3 hours as needed for other (pain control or improvement in physical function. Hold dose for analgesic side effects.)  Qty: 20 tablet, Refills: 0    Associated Diagnoses: S/P  section      acetaminophen (TYLENOL) 325 MG tablet Take 1-2 tablets (325-650 mg) by mouth every 4 hours as needed for fever or pain  Qty: 30 tablet, Refills: 0    Associated Diagnoses: S/P  section      ibuprofen (ADVIL/MOTRIN) 800 MG tablet Take 1 tablet (800 mg) by mouth every 6 hours as needed for other (cramping)  Qty: 30 tablet, Refills: 0    Associated Diagnoses: S/P  section      senna-docusate (SENOKOT-S;PERICOLACE) 8.6-50 MG per tablet Take 1-2 tablets by mouth 2 times daily as needed for constipation  Qty: 30 tablet, Refills: 0    Associated Diagnoses: S/P  section         CONTINUE these medications which have NOT CHANGED    Details   Prenatal Vit-Fe Fumarate-FA (PRENATAL MULTIVITAMIN PLUS IRON) 27-0.8 MG TABS per tablet Take 1 tablet by mouth daily         STOP taking these medications       insulin regular (HUMULIN R/NOVOLIN R) 100 UNIT/ML injection Comments:   Reason for Stopping:         INSULIN NPH, HUMAN,, ISOPHANE, SC Comments:   Reason for Stopping:             Allergies   Allergies   Allergen Reactions     Amoxicillin Rash

## 2018-04-07 NOTE — PLAN OF CARE
Problem: Patient Care Overview  Goal: Plan of Care/Patient Progress Review  Outcome: Adequate for Discharge Date Met: 04/07/18  VSS. Up ad [place], independent with cares. Was not compliant with AM BG draw prior to breakfast. Prior to lunch . Pain controlled with tylenol, ibuprofen and oxycodone x 1. No attempts made for breastfeeding today. Has requested an early discharge today. Staples removed. All discharge teaching completed with  present, who is  for her spoken language. Follow up with OB in 6 weeks. Ambulated out of hospital with  at 1415.

## 2018-04-07 NOTE — PLAN OF CARE
Problem: Patient Care Overview  Goal: Plan of Care/Patient Progress Review  Outcome: Improving  Pt up ad parish.  VSS.  Pain well controlled with ibuprofen and tylenol.  Voiding.  Incision WNL.  B.  Formula feeding.  Independent with  cares.  Continue to monitor.

## 2019-11-04 ENCOUNTER — HOSPITAL ENCOUNTER (EMERGENCY)
Facility: CLINIC | Age: 34
Discharge: HOME | End: 2019-11-04
Attending: EMERGENCY MEDICINE | Admitting: EMERGENCY MEDICINE
Payer: COMMERCIAL

## 2019-11-04 VITALS
HEART RATE: 61 BPM | RESPIRATION RATE: 18 BRPM | SYSTOLIC BLOOD PRESSURE: 95 MMHG | OXYGEN SATURATION: 100 % | TEMPERATURE: 97.9 F | DIASTOLIC BLOOD PRESSURE: 62 MMHG

## 2019-11-04 DIAGNOSIS — S61.216A LACERATION OF RIGHT LITTLE FINGER WITHOUT FOREIGN BODY WITHOUT DAMAGE TO NAIL, INITIAL ENCOUNTER: ICD-10-CM

## 2019-11-04 DIAGNOSIS — R55 VASOVAGAL SYNCOPE: ICD-10-CM

## 2019-11-04 LAB
ANION GAP SERPL CALCULATED.3IONS-SCNC: 4 MMOL/L (ref 3–14)
B-HCG FREE SERPL-ACNC: <5 IU/L
BASOPHILS # BLD AUTO: 0 10E9/L (ref 0–0.2)
BASOPHILS NFR BLD AUTO: 0.4 %
BUN SERPL-MCNC: 7 MG/DL (ref 7–30)
CALCIUM SERPL-MCNC: 8.8 MG/DL (ref 8.5–10.1)
CHLORIDE SERPL-SCNC: 106 MMOL/L (ref 94–109)
CO2 SERPL-SCNC: 27 MMOL/L (ref 20–32)
CREAT SERPL-MCNC: 0.64 MG/DL (ref 0.52–1.04)
DIFFERENTIAL METHOD BLD: ABNORMAL
EOSINOPHIL # BLD AUTO: 0.1 10E9/L (ref 0–0.7)
EOSINOPHIL NFR BLD AUTO: 0.9 %
ERYTHROCYTE [DISTWIDTH] IN BLOOD BY AUTOMATED COUNT: 16.5 % (ref 10–15)
GFR SERPL CREATININE-BSD FRML MDRD: >90 ML/MIN/{1.73_M2}
GLUCOSE BLDC GLUCOMTR-MCNC: 131 MG/DL (ref 70–99)
GLUCOSE SERPL-MCNC: 119 MG/DL (ref 70–99)
HCT VFR BLD AUTO: 34.5 % (ref 35–47)
HGB BLD-MCNC: 10 G/DL (ref 11.7–15.7)
IMM GRANULOCYTES # BLD: 0 10E9/L (ref 0–0.4)
IMM GRANULOCYTES NFR BLD: 0.2 %
INTERPRETATION ECG - MUSE: NORMAL
LYMPHOCYTES # BLD AUTO: 1.3 10E9/L (ref 0.8–5.3)
LYMPHOCYTES NFR BLD AUTO: 24.1 %
MCH RBC QN AUTO: 21.1 PG (ref 26.5–33)
MCHC RBC AUTO-ENTMCNC: 29 G/DL (ref 31.5–36.5)
MCV RBC AUTO: 73 FL (ref 78–100)
MONOCYTES # BLD AUTO: 0.5 10E9/L (ref 0–1.3)
MONOCYTES NFR BLD AUTO: 9.7 %
NEUTROPHILS # BLD AUTO: 3.6 10E9/L (ref 1.6–8.3)
NEUTROPHILS NFR BLD AUTO: 64.7 %
NRBC # BLD AUTO: 0 10*3/UL
NRBC BLD AUTO-RTO: 0 /100
PLATELET # BLD AUTO: 182 10E9/L (ref 150–450)
POTASSIUM SERPL-SCNC: 3.8 MMOL/L (ref 3.4–5.3)
RBC # BLD AUTO: 4.74 10E12/L (ref 3.8–5.2)
SODIUM SERPL-SCNC: 137 MMOL/L (ref 133–144)
WBC # BLD AUTO: 5.6 10E9/L (ref 4–11)

## 2019-11-04 PROCEDURE — 80048 BASIC METABOLIC PNL TOTAL CA: CPT | Performed by: EMERGENCY MEDICINE

## 2019-11-04 PROCEDURE — 27210282 ZZH ADHESIVE DERMABOND SKIN

## 2019-11-04 PROCEDURE — 84702 CHORIONIC GONADOTROPIN TEST: CPT

## 2019-11-04 PROCEDURE — 00000146 ZZHCL STATISTIC GLUCOSE BY METER IP

## 2019-11-04 PROCEDURE — 85025 COMPLETE CBC W/AUTO DIFF WBC: CPT | Performed by: EMERGENCY MEDICINE

## 2019-11-04 PROCEDURE — 12001 RPR S/N/AX/GEN/TRNK 2.5CM/<: CPT

## 2019-11-04 PROCEDURE — 25800030 ZZH RX IP 258 OP 636: Performed by: EMERGENCY MEDICINE

## 2019-11-04 PROCEDURE — 99284 EMERGENCY DEPT VISIT MOD MDM: CPT | Mod: 25

## 2019-11-04 PROCEDURE — 96360 HYDRATION IV INFUSION INIT: CPT

## 2019-11-04 PROCEDURE — 93005 ELECTROCARDIOGRAM TRACING: CPT

## 2019-11-04 PROCEDURE — 96361 HYDRATE IV INFUSION ADD-ON: CPT | Mod: 59

## 2019-11-04 RX ADMIN — SODIUM CHLORIDE 1000 ML: 9 INJECTION, SOLUTION INTRAVENOUS at 12:17

## 2019-11-04 ASSESSMENT — ENCOUNTER SYMPTOMS
LIGHT-HEADEDNESS: 1
ABDOMINAL PAIN: 0
DIZZINESS: 1
FATIGUE: 1
NAUSEA: 1
WOUND: 1
SHORTNESS OF BREATH: 1

## 2019-11-04 NOTE — ED PROVIDER NOTES
History     Chief Complaint:  Syncope    HPI   Lucho Lugo is a 34 year old female who presents to the ED via EMS for evaluation of syncopal episodes. The patient states that she was making food for her child when she accidentally cut her fifth finger of her right hand. The patient states that when she saw the blood she felt faint, and lost consciousness. The patient reports that when she came to, she still felt dizzy and fatigued; she states that she drank some anthony juice that her  gave her. They presented to clinic where the patient lost consciousness again at seeing the blood on her finger, and EMS was called. In the ED, the patient feels nauseous and has some mild shortness of breath. The patient denies any chest pain, abdominal pain, or chance of pregnancy. She is up to date on her Tetanus vaccination.    Allergies:  Amoxicillin, rash     Medications:    Tylenol  Advil  Oxycodone  Prenatal vitamins  Senna-docusate    Past Medical History:    Gestational diabetes  Gestational thrombocytopenia    Past Surgical History:     section, x2  Cholecystectomy     Family History:    No past pertinent family history.    Social History:  Negative for tobacco use.  Negative for alcohol use.  Negative for drug use.  Marital Status:   [2]     Review of Systems   Constitutional: Positive for fatigue.   Respiratory: Positive for shortness of breath.    Cardiovascular: Negative for chest pain.   Gastrointestinal: Positive for nausea. Negative for abdominal pain.   Skin: Positive for wound.   Neurological: Positive for dizziness, syncope and light-headedness.   All other systems reviewed and are negative.        Physical Exam     Patient Vitals for the past 24 hrs:   BP Temp Temp src Pulse Heart Rate Resp SpO2   19 1330 109/71 -- -- -- -- -- --   19 1211 108/65 97.9  F (36.6  C) Oral 61 58 18 100 %     Physical Exam  Nursing note and vitals reviewed.  Constitutional: Cooperative.    HENT:   Mouth/Throat: Moist mucous membranes.   Eyes: EOMI, nonicteric sclera  Cardiovascular: Normal rate, regular rhythm, no murmurs, rubs, or gallops  Pulmonary/Chest: Effort normal and breath sounds normal. No respiratory distress. No wheezes. No rales.   Abdominal: Soft. Nontender, nondistended, no guarding or rigidity. BS present.   Musculoskeletal: Normal range of motion.   Neurological: Alert. Moves all extremities spontaneously.   Skin: Skin is warm and dry. No rash noted. Small <1cm laceration over DIP on right 5th finger. No tendon involvement.   Psychiatric: Normal mood and affect.       Emergency Department Course   ECG:  Indication: Syncopal Episode  Time: 1200  Vent. Rate 64 bpm. MS interval 148. QRS duration 82. QT/QTc 428/441. P-R-T axis 21 81 66.  Normal sinus rhythm. Normal ECG. Read time: 1204    Laboratory:  CBC: WBC: 5.6, HGB: 10.0 (L), PLT: 182  BMP: Glucose 119 (H), o/w WNL (Creatinine: 0.64)  ISTAT HCG quantitative pregnancy POCT: <5.0  1209 Glucose by meter: 131 (H)    Procedures:    Laceration Repair        LACERATION:  A simple clean <1 cm laceration.      LOCATION:  Right fifth finger      FUNCTION:  Distally sensation are intact.      ANESTHESIA:  none      PREPARATION:  Cleansed with Tap Water      DEBRIDEMENT:  no debridement      CLOSURE:  Wound was closed with Dermabond    Interventions:  1217 NS 1L IV Bolus    Emergency Department Course:  Nursing notes and vitals reviewed. (1150) I performed an exam of the patient as documented above.     1200 ECG was obtained.     IV inserted. Medicine administered as documented above. Blood drawn. This was sent to the lab for further testing, results above.     1417 I rechecked the patient and discussed the results of her workup thus far. I performed a laceration repair with Dermabond at this time.    Findings and plan explained to the Patient. Patient discharged home with instructions regarding supportive care, medications, and reasons to  return. The importance of close follow-up was reviewed.     Impression & Plan    Medical Decision Making:  Patient presents with syncopal episode x2 after seeing blood.  This is consistent with a vasovagal episode.  EKG was obtained which does not suggest any alternate or concerning etiology.  Tetanus up-to-date.  After discussion with patient, her finger laceration is superficial, though it is over a knuckle.  We discussed stitches versus Dermabond, and patient would prefer Dermabond, even with the likely range of motion limitations she will have after placement.  Fortunately, it is a superficial wound and should heal well without stitches.     Diagnosis:    ICD-10-CM   1. Laceration of right little finger without foreign body without damage to nail, initial encounter S61.216A   2. Vasovagal syncope R55       Disposition:  The patient was discharged to home.    Scribe Disclosure:  IDarshana, am serving as a scribe on 11/4/2019 at 11:50 AM to personally document services performed by Jake Silvestre MD based on my observations and the provider's statements to me.     Darshana Davis  11/4/2019   [facility] EMERGENCY DEPARTMENT       Jake Silvestre MD  11/04/19 6045

## 2019-11-04 NOTE — ED NOTES
Bed: ED38  Expected date: 11/4/19  Expected time: 11:28 AM  Means of arrival: Ambulance  Comments:  BV1  35yo syncope

## 2019-11-04 NOTE — ED AVS SNAPSHOT
[facility] Emergency Department  [address]  [facility] [address]  Phone:  [phone]  Fax:  [phone]                                    Lucho Lugo   MRN: [id number]    Department:  [facility] Emergency Department   Date of Visit:  11/4/2019           After Visit Summary Signature Page    I have received my discharge instructions, and my questions have been answered. I have discussed any challenges I see with this plan with the nurse or doctor.    ..........................................................................................................................................  Patient/Patient Representative Signature      ..........................................................................................................................................  Patient Representative Print Name and Relationship to Patient    ..................................................               ................................................  Date                                   Time    ..........................................................................................................................................  Reviewed by Signature/Title    ...................................................              ..............................................  Date                                               Time          22EPIC Rev 08/18

## 2019-11-04 NOTE — ED TRIAGE NOTES
Patient was at home when she cut her finger while cutting a bag. Her and her  went to [employer] in [place] and she passed out after seeing the blood. EMS was called and she was brought here for evaluation. Bleeding controlled at laceration. VSS. Lethargic, but oriented.

## 2020-07-23 ENCOUNTER — VIRTUAL VISIT (OUTPATIENT)
Dept: FAMILY MEDICINE | Facility: CLINIC | Age: 35
End: 2020-07-23
Payer: COMMERCIAL

## 2020-07-23 DIAGNOSIS — H10.13 ALLERGIC CONJUNCTIVITIS, BILATERAL: Primary | ICD-10-CM

## 2020-07-23 RX ORDER — CETIRIZINE HYDROCHLORIDE 10 MG/1
10 TABLET ORAL DAILY
Qty: 30 TABLET | Refills: 3 | Status: SHIPPED | OUTPATIENT
Start: 2020-07-23 | End: 2020-09-30

## 2020-07-23 RX ORDER — OLOPATADINE HYDROCHLORIDE 2 MG/ML
1 SOLUTION/ DROPS OPHTHALMIC DAILY
Status: ON HOLD | COMMUNITY
End: 2021-02-14

## 2020-07-23 RX ORDER — MONTELUKAST SODIUM 10 MG/1
10 TABLET ORAL AT BEDTIME
Qty: 30 TABLET | Refills: 1 | Status: SHIPPED | OUTPATIENT
Start: 2020-07-23 | End: 2020-09-28

## 2020-07-23 RX ORDER — OLOPATADINE HYDROCHLORIDE 2 MG/ML
1 SOLUTION/ DROPS OPHTHALMIC DAILY
Qty: 5 ML | Refills: 1 | Status: SHIPPED | OUTPATIENT
Start: 2020-07-23 | End: 2020-09-21

## 2020-07-23 NOTE — PROGRESS NOTES
"Lucho Lugo is a 35 year old female who is being evaluated via a billable video visit.      The patient has been notified of following:     \"This video visit will be conducted via a call between you and your physician/provider. We have found that certain health care needs can be provided without the need for an in-person physical exam.  This service lets us provide the care you need with a video conversation.  If a prescription is necessary we can send it directly to your pharmacy.  If lab work is needed we can place an order for that and you can then stop by our lab to have the test done at a later time.    Video visits are billed at different rates depending on your insurance coverage.  Please reach out to your insurance provider with any questions.    If during the course of the call the physician/provider feels a video visit is not appropriate, you will not be charged for this service.\"    Patient has given verbal consent for Video visit? Yes  How would you like to obtain your AVS? MyChart  If you are dropped from the video visit, the video invite should be resent to: Text to cell phone: [phone]  Will anyone else be joining your video visit? No    Subjective     Lucho Lugo is a 35 year old female who presents today via video visit for the following health issues:    HPI    ALLERGIES -eyes itching     Onset:  X 3 weeks    Description:   Nasal congestion: no   Sneezing: no   Red, itchy eyes: YES- itchy - burning    Progression of Symptoms:  worse    Accompanying Signs & Symptoms:  Cough: no   Wheezing: no   Rash: no   Sinus/facial pain: no    History:   Is it seasonal: in the summer   History of Asthma: no   Has allergy testing been done: no     Precipitating factors:   None    Alleviating factors:  None       Therapies Tried and outcome: patady eye drops-temporary relief    Allergic rhinitis with itchy water eyes, not purulent, watery nasal discharge not mucous and no associated wheeze "     Video Start Time: 10:41    Past Medical History:   Diagnosis Date     Diabetes (H)     gestational     H/O gestational diabetes mellitus, not currently pregnant     - diet       Past Surgical History:   Procedure Laterality Date      SECTION        SECTION N/A 2016    Procedure:  SECTION;  Surgeon: Keren Cervantes DO;  Location: [facility]      SECTION N/A 2018    Procedure:  SECTION;  Repeat  Section ;  Surgeon: Elizabeth Gudino MD;  Location: [facility]     CHOLECYSTOSTOMY         History reviewed. No pertinent family history.    Social History     Tobacco Use     Smoking status: Never Smoker     Smokeless tobacco: Never Used   Substance Use Topics     Alcohol use: No         BP Readings from Last 3 Encounters:   19 95/62   18 119/73   17 (!) 138/93    Wt Readings from Last 3 Encounters:   18 67.1 kg (148 lb)   17 62.6 kg (138 lb)   16 65.3 kg (144 lb)                    Reviewed and updated as needed this visit by Provider         Review of Systems   Constitutional, HEENT, cardiovascular, pulmonary, GI, , musculoskeletal, neuro, skin, endocrine and psych systems are negative, except as otherwise noted.      Objective             Physical Exam     GENERAL: Healthy, alert and no distress  EYES: Eyes grossly normal to inspection.  No discharge or erythema, or obvious scleral/conjunctival abnormalities.  HENT: Normal cephalic/atraumatic.  External ears, nose and mouth without ulcers or lesions.  No nasal drainage visible.  NECK: No asymmetry, visible masses or scars  RESP: No audible wheeze, cough, or visible cyanosis.  No visible retractions or increased work of breathing.    SKIN: Visible skin clear. No significant rash, abnormal pigmentation or lesions.  PSYCH: Mentation appears normal, affect normal/bright, judgement and insight intact, normal speech and appearance well-groomed.              Assessment & Plan      1. Allergic conjunctivitis, bilateral    - olopatadine (PATADAY) 0.2 % ophthalmic solution; Place 1 drop into both eyes daily for 60 doses  Dispense: 5 mL; Refill: 1  - montelukast (SINGULAIR) 10 MG tablet; Take 1 tablet (10 mg) by mouth At Bedtime  Dispense: 30 tablet; Refill: 1  - cetirizine (ZYRTEC) 10 MG tablet; Take 1 tablet (10 mg) by mouth daily  Dispense: 30 tablet; Refill: 3       Air conditioning and animal dander     No follow-ups on file.    Sanchez Matamoros MD  [facility]      Video-Visit Details    Type of service:  Video Visit    Video End Time:11:09    Originating Location (pt. Location): Home    Distant Location (provider location):  [facility]     Platform used for Video Visit: Sheryl Matamoros MD

## 2020-09-30 ENCOUNTER — NURSE TRIAGE (OUTPATIENT)
Dept: NURSING | Facility: CLINIC | Age: 35
End: 2020-09-30

## 2020-09-30 DIAGNOSIS — H10.13 ALLERGIC CONJUNCTIVITIS, BILATERAL: ICD-10-CM

## 2020-09-30 RX ORDER — CETIRIZINE HYDROCHLORIDE 10 MG/1
10 TABLET ORAL DAILY
Qty: 30 TABLET | Refills: 3 | Status: SHIPPED | OUTPATIENT
Start: 2020-09-30 | End: 2021-04-29

## 2020-09-30 NOTE — TELEPHONE ENCOUNTER
Patient requesting refill for:    Cetirizine (ZYRTEC) 10 mg tablet  Last filled 7/23/2020, 30 tablets, 3 refills    [facility] (on file)    Ryann Xiong RN  [facility]    Additional Information    Caller requesting a NON-URGENT new prescription or refill and triager unable to refill per department policy    Protocols used: MEDICATION QUESTION CALL-A-[place]

## 2020-09-30 NOTE — TELEPHONE ENCOUNTER
Prescription approved per [facility] Refill Protocol.    Delphine Salas RN

## 2020-12-27 ENCOUNTER — HEALTH MAINTENANCE LETTER (OUTPATIENT)
Age: 35
End: 2020-12-27

## 2020-12-28 ENCOUNTER — HOSPITAL ENCOUNTER (EMERGENCY)
Facility: CLINIC | Age: 35
Discharge: LEFT BEFORE DISCHARGE | End: 2020-12-28
Payer: COMMERCIAL

## 2020-12-28 VITALS
DIASTOLIC BLOOD PRESSURE: 105 MMHG | RESPIRATION RATE: 18 BRPM | TEMPERATURE: 97.3 F | SYSTOLIC BLOOD PRESSURE: 137 MMHG | HEART RATE: 79 BPM | OXYGEN SATURATION: 100 %

## 2020-12-28 NOTE — ED TRIAGE NOTES
"Patient c/o itching and \"feeling uncomfortable\". States she has been taking allergy medications for a long time. Today she woke up feeling itchy and states \"everything swollen and red\". States her allergy medication has not helped.   "

## 2020-12-28 NOTE — ED NOTES
"Patient and family member approached triage desk stating \"I am feeling much better and would like to leave\". Patient stated she will make a follow up apt with her primary but itchy and swelling has gotten better. Signed paperwork and left with family member.   "

## 2021-02-14 ENCOUNTER — APPOINTMENT (OUTPATIENT)
Dept: GENERAL RADIOLOGY | Facility: CLINIC | Age: 36
End: 2021-02-14
Attending: EMERGENCY MEDICINE
Payer: COMMERCIAL

## 2021-02-14 ENCOUNTER — HOSPITAL ENCOUNTER (INPATIENT)
Facility: CLINIC | Age: 36
LOS: 2 days | Discharge: HOME | End: 2021-02-16
Attending: EMERGENCY MEDICINE | Admitting: INTERNAL MEDICINE
Payer: COMMERCIAL

## 2021-02-14 DIAGNOSIS — D50.9 IRON DEFICIENCY ANEMIA, UNSPECIFIED IRON DEFICIENCY ANEMIA TYPE: Primary | ICD-10-CM

## 2021-02-14 DIAGNOSIS — T78.2XXA ANAPHYLAXIS, INITIAL ENCOUNTER: ICD-10-CM

## 2021-02-14 LAB
ALBUMIN SERPL-MCNC: 3.2 G/DL (ref 3.4–5)
ALBUMIN UR-MCNC: NEGATIVE MG/DL
ALP SERPL-CCNC: 49 U/L (ref 40–150)
ALT SERPL W P-5'-P-CCNC: 18 U/L (ref 0–50)
AMPHETAMINES UR QL SCN: NEGATIVE
ANION GAP SERPL CALCULATED.3IONS-SCNC: 4 MMOL/L (ref 3–14)
ANION GAP SERPL CALCULATED.3IONS-SCNC: 7 MMOL/L (ref 3–14)
APAP SERPL-MCNC: 4 MG/L (ref 10–20)
APPEARANCE UR: CLEAR
AST SERPL W P-5'-P-CCNC: 16 U/L (ref 0–45)
BARBITURATES UR QL: NEGATIVE
BASE DEFICIT BLDA-SCNC: 4.4 MMOL/L
BASE DEFICIT BLDA-SCNC: 7.6 MMOL/L
BASOPHILS # BLD AUTO: 0 10E9/L (ref 0–0.2)
BASOPHILS NFR BLD AUTO: 0.2 %
BENZODIAZ UR QL: POSITIVE
BILIRUB DIRECT SERPL-MCNC: <0.1 MG/DL (ref 0–0.2)
BILIRUB SERPL-MCNC: 0.2 MG/DL (ref 0.2–1.3)
BILIRUB UR QL STRIP: NEGATIVE
BUN SERPL-MCNC: 10 MG/DL (ref 7–30)
BUN SERPL-MCNC: 8 MG/DL (ref 7–30)
CA-I BLD-MCNC: 4 MG/DL (ref 4.4–5.2)
CALCIUM SERPL-MCNC: 6.3 MG/DL (ref 8.5–10.1)
CALCIUM SERPL-MCNC: 7.2 MG/DL (ref 8.5–10.1)
CANNABINOIDS UR QL SCN: NEGATIVE
CHLORIDE SERPL-SCNC: 111 MMOL/L (ref 94–109)
CHLORIDE SERPL-SCNC: 117 MMOL/L (ref 94–109)
CO2 SERPL-SCNC: 23 MMOL/L (ref 20–32)
CO2 SERPL-SCNC: 24 MMOL/L (ref 20–32)
COCAINE UR QL: NEGATIVE
COLOR UR AUTO: ABNORMAL
CREAT SERPL-MCNC: 0.49 MG/DL (ref 0.52–1.04)
CREAT SERPL-MCNC: 0.64 MG/DL (ref 0.52–1.04)
DIFFERENTIAL METHOD BLD: ABNORMAL
EOSINOPHIL # BLD AUTO: 0.1 10E9/L (ref 0–0.7)
EOSINOPHIL NFR BLD AUTO: 1.1 %
ERYTHROCYTE [DISTWIDTH] IN BLOOD BY AUTOMATED COUNT: 17.5 % (ref 10–15)
GFR SERPL CREATININE-BSD FRML MDRD: >90 ML/MIN/{1.73_M2}
GFR SERPL CREATININE-BSD FRML MDRD: >90 ML/MIN/{1.73_M2}
GLUCOSE BLDC GLUCOMTR-MCNC: 168 MG/DL (ref 70–99)
GLUCOSE BLDC GLUCOMTR-MCNC: 174 MG/DL (ref 70–99)
GLUCOSE SERPL-MCNC: 133 MG/DL (ref 70–99)
GLUCOSE SERPL-MCNC: 175 MG/DL (ref 70–99)
GLUCOSE UR STRIP-MCNC: NEGATIVE MG/DL
HCG SERPL QL: NEGATIVE
HCO3 BLD-SCNC: 18 MMOL/L (ref 21–28)
HCO3 BLD-SCNC: 22 MMOL/L (ref 21–28)
HCT VFR BLD AUTO: 33 % (ref 35–47)
HGB BLD-MCNC: 9.5 G/DL (ref 11.7–15.7)
HGB UR QL STRIP: ABNORMAL
IMM GRANULOCYTES # BLD: 0.1 10E9/L (ref 0–0.4)
IMM GRANULOCYTES NFR BLD: 0.6 %
INTERPRETATION ECG - MUSE: NORMAL
KETONES UR STRIP-MCNC: NEGATIVE MG/DL
LABORATORY COMMENT REPORT: NORMAL
LACTATE BLD-SCNC: 3.1 MMOL/L (ref 0.7–2)
LACTATE BLD-SCNC: 5.8 MMOL/L (ref 0.7–2)
LEUKOCYTE ESTERASE UR QL STRIP: NEGATIVE
LYMPHOCYTES # BLD AUTO: 4.6 10E9/L (ref 0.8–5.3)
LYMPHOCYTES NFR BLD AUTO: 54.9 %
MAGNESIUM SERPL-MCNC: 1.7 MG/DL (ref 1.6–2.3)
MAGNESIUM SERPL-MCNC: 1.8 MG/DL (ref 1.6–2.3)
MCH RBC QN AUTO: 20.2 PG (ref 26.5–33)
MCHC RBC AUTO-ENTMCNC: 28.8 G/DL (ref 31.5–36.5)
MCV RBC AUTO: 70 FL (ref 78–100)
MONOCYTES # BLD AUTO: 0.7 10E9/L (ref 0–1.3)
MONOCYTES NFR BLD AUTO: 7.7 %
MUCOUS THREADS #/AREA URNS LPF: PRESENT /LPF
NEUTROPHILS # BLD AUTO: 3 10E9/L (ref 1.6–8.3)
NEUTROPHILS NFR BLD AUTO: 35.5 %
NITRATE UR QL: NEGATIVE
NRBC # BLD AUTO: 0 10*3/UL
NRBC BLD AUTO-RTO: 0 /100
O2/TOTAL GAS SETTING VFR VENT: 30 %
O2/TOTAL GAS SETTING VFR VENT: ABNORMAL %
OPIATES UR QL SCN: NEGATIVE
OXYHGB MFR BLD: 99 % (ref 92–100)
OXYHGB MFR BLD: 99 % (ref 92–100)
PCO2 BLD: 37 MM HG (ref 35–45)
PCO2 BLD: 48 MM HG (ref 35–45)
PCP UR QL SCN: NEGATIVE
PH BLD: 7.28 PH (ref 7.35–7.45)
PH BLD: 7.3 PH (ref 7.35–7.45)
PH UR STRIP: 5.5 PH (ref 5–7)
PHOSPHATE SERPL-MCNC: 2.6 MG/DL (ref 2.5–4.5)
PLATELET # BLD AUTO: 198 10E9/L (ref 150–450)
PO2 BLD: 170 MM HG (ref 80–105)
PO2 BLD: 236 MM HG (ref 80–105)
POTASSIUM SERPL-SCNC: 2.5 MMOL/L (ref 3.4–5.3)
POTASSIUM SERPL-SCNC: 4.5 MMOL/L (ref 3.4–5.3)
PROCALCITONIN SERPL-MCNC: 1.71 NG/ML
PROT SERPL-MCNC: 6.4 G/DL (ref 6.8–8.8)
RBC # BLD AUTO: 4.7 10E12/L (ref 3.8–5.2)
RBC #/AREA URNS AUTO: 34 /HPF (ref 0–2)
SALICYLATES SERPL-MCNC: <2 MG/DL
SARS-COV-2 RNA RESP QL NAA+PROBE: NORMAL
SODIUM SERPL-SCNC: 141 MMOL/L (ref 133–144)
SODIUM SERPL-SCNC: 145 MMOL/L (ref 133–144)
SOURCE: ABNORMAL
SP GR UR STRIP: 1.02 (ref 1–1.03)
SPECIMEN SOURCE: NORMAL
SQUAMOUS #/AREA URNS AUTO: <1 /HPF (ref 0–1)
UROBILINOGEN UR STRIP-MCNC: 0 MG/DL (ref 0–2)
WBC # BLD AUTO: 8.5 10E9/L (ref 4–11)
WBC #/AREA URNS AUTO: 0 /HPF (ref 0–5)

## 2021-02-14 PROCEDURE — 84100 ASSAY OF PHOSPHORUS: CPT | Performed by: INTERNAL MEDICINE

## 2021-02-14 PROCEDURE — 94003 VENT MGMT INPAT SUBQ DAY: CPT

## 2021-02-14 PROCEDURE — 94640 AIRWAY INHALATION TREATMENT: CPT

## 2021-02-14 PROCEDURE — 83520 IMMUNOASSAY QUANT NOS NONAB: CPT | Performed by: EMERGENCY MEDICINE

## 2021-02-14 PROCEDURE — 80048 BASIC METABOLIC PNL TOTAL CA: CPT | Performed by: INTERNAL MEDICINE

## 2021-02-14 PROCEDURE — 83735 ASSAY OF MAGNESIUM: CPT | Performed by: EMERGENCY MEDICINE

## 2021-02-14 PROCEDURE — 87486 CHLMYD PNEUM DNA AMP PROBE: CPT | Performed by: INTERNAL MEDICINE

## 2021-02-14 PROCEDURE — 999N000157 HC STATISTIC RCP TIME EA 10 MIN

## 2021-02-14 PROCEDURE — 82330 ASSAY OF CALCIUM: CPT | Performed by: INTERNAL MEDICINE

## 2021-02-14 PROCEDURE — 94002 VENT MGMT INPAT INIT DAY: CPT

## 2021-02-14 PROCEDURE — 85025 COMPLETE CBC W/AUTO DIFF WBC: CPT | Performed by: EMERGENCY MEDICINE

## 2021-02-14 PROCEDURE — 80048 BASIC METABOLIC PNL TOTAL CA: CPT | Performed by: EMERGENCY MEDICINE

## 2021-02-14 PROCEDURE — 87581 M.PNEUMON DNA AMP PROBE: CPT | Performed by: INTERNAL MEDICINE

## 2021-02-14 PROCEDURE — 86160 COMPLEMENT ANTIGEN: CPT | Performed by: INTERNAL MEDICINE

## 2021-02-14 PROCEDURE — 258N000003 HC RX IP 258 OP 636: Performed by: INTERNAL MEDICINE

## 2021-02-14 PROCEDURE — 96375 TX/PRO/DX INJ NEW DRUG ADDON: CPT

## 2021-02-14 PROCEDURE — 87633 RESP VIRUS 12-25 TARGETS: CPT | Performed by: INTERNAL MEDICINE

## 2021-02-14 PROCEDURE — 81001 URINALYSIS AUTO W/SCOPE: CPT | Performed by: INTERNAL MEDICINE

## 2021-02-14 PROCEDURE — 71045 X-RAY EXAM CHEST 1 VIEW: CPT

## 2021-02-14 PROCEDURE — 250N000011 HC RX IP 250 OP 636

## 2021-02-14 PROCEDURE — 80143 DRUG ASSAY ACETAMINOPHEN: CPT | Performed by: EMERGENCY MEDICINE

## 2021-02-14 PROCEDURE — 250N000011 HC RX IP 250 OP 636: Performed by: EMERGENCY MEDICINE

## 2021-02-14 PROCEDURE — 5A1945Z RESPIRATORY VENTILATION, 24-96 CONSECUTIVE HOURS: ICD-10-PCS | Performed by: EMERGENCY MEDICINE

## 2021-02-14 PROCEDURE — 99285 EMERGENCY DEPT VISIT HI MDM: CPT | Mod: 25

## 2021-02-14 PROCEDURE — 200N000001 HC R&B ICU

## 2021-02-14 PROCEDURE — 84145 PROCALCITONIN (PCT): CPT | Performed by: INTERNAL MEDICINE

## 2021-02-14 PROCEDURE — 84703 CHORIONIC GONADOTROPIN ASSAY: CPT | Performed by: EMERGENCY MEDICINE

## 2021-02-14 PROCEDURE — 96365 THER/PROPH/DIAG IV INF INIT: CPT

## 2021-02-14 PROCEDURE — 250N000013 HC RX MED GY IP 250 OP 250 PS 637: Performed by: INTERNAL MEDICINE

## 2021-02-14 PROCEDURE — 258N000003 HC RX IP 258 OP 636: Performed by: EMERGENCY MEDICINE

## 2021-02-14 PROCEDURE — 82805 BLOOD GASES W/O2 SATURATION: CPT | Performed by: INTERNAL MEDICINE

## 2021-02-14 PROCEDURE — 80307 DRUG TEST PRSMV CHEM ANLYZR: CPT | Performed by: EMERGENCY MEDICINE

## 2021-02-14 PROCEDURE — 36600 WITHDRAWAL OF ARTERIAL BLOOD: CPT

## 2021-02-14 PROCEDURE — 36415 COLL VENOUS BLD VENIPUNCTURE: CPT | Performed by: INTERNAL MEDICINE

## 2021-02-14 PROCEDURE — 250N000009 HC RX 250: Performed by: INTERNAL MEDICINE

## 2021-02-14 PROCEDURE — 250N000009 HC RX 250: Performed by: EMERGENCY MEDICINE

## 2021-02-14 PROCEDURE — 94640 AIRWAY INHALATION TREATMENT: CPT | Mod: 76

## 2021-02-14 PROCEDURE — 93005 ELECTROCARDIOGRAM TRACING: CPT

## 2021-02-14 PROCEDURE — 96367 TX/PROPH/DG ADDL SEQ IV INF: CPT

## 2021-02-14 PROCEDURE — 96376 TX/PRO/DX INJ SAME DRUG ADON: CPT

## 2021-02-14 PROCEDURE — 83735 ASSAY OF MAGNESIUM: CPT | Performed by: INTERNAL MEDICINE

## 2021-02-14 PROCEDURE — 99291 CRITICAL CARE FIRST HOUR: CPT | Performed by: INTERNAL MEDICINE

## 2021-02-14 PROCEDURE — 87040 BLOOD CULTURE FOR BACTERIA: CPT | Performed by: INTERNAL MEDICINE

## 2021-02-14 PROCEDURE — 80076 HEPATIC FUNCTION PANEL: CPT | Performed by: EMERGENCY MEDICINE

## 2021-02-14 PROCEDURE — 250N000011 HC RX IP 250 OP 636: Performed by: INTERNAL MEDICINE

## 2021-02-14 PROCEDURE — 80179 DRUG ASSAY SALICYLATE: CPT | Performed by: EMERGENCY MEDICINE

## 2021-02-14 PROCEDURE — 83605 ASSAY OF LACTIC ACID: CPT | Performed by: INTERNAL MEDICINE

## 2021-02-14 PROCEDURE — 999N001017 HC STATISTIC GLUCOSE BY METER IP

## 2021-02-14 RX ORDER — METHYLPREDNISOLONE SODIUM SUCCINATE 125 MG/2ML
125 INJECTION, POWDER, LYOPHILIZED, FOR SOLUTION INTRAMUSCULAR; INTRAVENOUS EVERY 6 HOURS
Status: DISCONTINUED | OUTPATIENT
Start: 2021-02-14 | End: 2021-02-16 | Stop reason: HOSPADM

## 2021-02-14 RX ORDER — FENTANYL CITRATE 50 UG/ML
25 INJECTION, SOLUTION INTRAMUSCULAR; INTRAVENOUS ONCE
Status: COMPLETED | OUTPATIENT
Start: 2021-02-14 | End: 2021-02-14

## 2021-02-14 RX ORDER — FENTANYL CITRATE 50 UG/ML
INJECTION, SOLUTION INTRAMUSCULAR; INTRAVENOUS
Status: COMPLETED
Start: 2021-02-14 | End: 2021-02-14

## 2021-02-14 RX ORDER — HYDROMORPHONE HYDROCHLORIDE 1 MG/ML
0.5 INJECTION, SOLUTION INTRAMUSCULAR; INTRAVENOUS; SUBCUTANEOUS ONCE
Status: COMPLETED | OUTPATIENT
Start: 2021-02-14 | End: 2021-02-14

## 2021-02-14 RX ORDER — POTASSIUM CHLORIDE 7.45 MG/ML
10 INJECTION INTRAVENOUS
Status: ACTIVE | OUTPATIENT
Start: 2021-02-14 | End: 2021-02-14

## 2021-02-14 RX ORDER — CHLORHEXIDINE GLUCONATE ORAL RINSE 1.2 MG/ML
15 SOLUTION DENTAL EVERY 12 HOURS
Status: DISCONTINUED | OUTPATIENT
Start: 2021-02-14 | End: 2021-02-15

## 2021-02-14 RX ORDER — NALOXONE HYDROCHLORIDE 0.4 MG/ML
0.2 INJECTION, SOLUTION INTRAMUSCULAR; INTRAVENOUS; SUBCUTANEOUS
Status: DISCONTINUED | OUTPATIENT
Start: 2021-02-14 | End: 2021-02-16 | Stop reason: HOSPADM

## 2021-02-14 RX ORDER — NALOXONE HYDROCHLORIDE 0.4 MG/ML
0.4 INJECTION, SOLUTION INTRAMUSCULAR; INTRAVENOUS; SUBCUTANEOUS
Status: DISCONTINUED | OUTPATIENT
Start: 2021-02-14 | End: 2021-02-16 | Stop reason: HOSPADM

## 2021-02-14 RX ORDER — METHYLPREDNISOLONE SODIUM SUCCINATE 125 MG/2ML
125 INJECTION, POWDER, LYOPHILIZED, FOR SOLUTION INTRAMUSCULAR; INTRAVENOUS ONCE
Status: COMPLETED | OUTPATIENT
Start: 2021-02-14 | End: 2021-02-14

## 2021-02-14 RX ORDER — PROPOFOL 10 MG/ML
0-75 INJECTION, EMULSION INTRAVENOUS CONTINUOUS
Status: DISCONTINUED | OUTPATIENT
Start: 2021-02-14 | End: 2021-02-14

## 2021-02-14 RX ORDER — DIPHENHYDRAMINE HYDROCHLORIDE 50 MG/ML
25 INJECTION INTRAMUSCULAR; INTRAVENOUS EVERY 6 HOURS
Status: COMPLETED | OUTPATIENT
Start: 2021-02-14 | End: 2021-02-15

## 2021-02-14 RX ORDER — PROPOFOL 10 MG/ML
10-20 INJECTION, EMULSION INTRAVENOUS EVERY 30 MIN PRN
Status: DISCONTINUED | OUTPATIENT
Start: 2021-02-14 | End: 2021-02-15

## 2021-02-14 RX ORDER — DEXTROSE MONOHYDRATE 25 G/50ML
25-50 INJECTION, SOLUTION INTRAVENOUS
Status: DISCONTINUED | OUTPATIENT
Start: 2021-02-14 | End: 2021-02-16 | Stop reason: HOSPADM

## 2021-02-14 RX ORDER — POTASSIUM CHLORIDE 7.45 MG/ML
10 INJECTION INTRAVENOUS ONCE
Status: COMPLETED | OUTPATIENT
Start: 2021-02-14 | End: 2021-02-14

## 2021-02-14 RX ORDER — PROPOFOL 10 MG/ML
5-75 INJECTION, EMULSION INTRAVENOUS CONTINUOUS
Status: DISCONTINUED | OUTPATIENT
Start: 2021-02-14 | End: 2021-02-14

## 2021-02-14 RX ORDER — DOCUSATE SODIUM 100 MG/1
100 CAPSULE, LIQUID FILLED ORAL 2 TIMES DAILY
Status: DISCONTINUED | OUTPATIENT
Start: 2021-02-14 | End: 2021-02-16 | Stop reason: HOSPADM

## 2021-02-14 RX ORDER — PROPOFOL 10 MG/ML
5-75 INJECTION, EMULSION INTRAVENOUS CONTINUOUS
Status: DISCONTINUED | OUTPATIENT
Start: 2021-02-14 | End: 2021-02-15

## 2021-02-14 RX ORDER — NICOTINE POLACRILEX 4 MG
15-30 LOZENGE BUCCAL
Status: DISCONTINUED | OUTPATIENT
Start: 2021-02-14 | End: 2021-02-16 | Stop reason: HOSPADM

## 2021-02-14 RX ORDER — DIPHENHYDRAMINE HCL 25 MG
25 TABLET ORAL EVERY 8 HOURS PRN
COMMUNITY
End: 2021-04-29

## 2021-02-14 RX ORDER — HYDROMORPHONE HYDROCHLORIDE 1 MG/ML
.3-.5 INJECTION, SOLUTION INTRAMUSCULAR; INTRAVENOUS; SUBCUTANEOUS
Status: DISCONTINUED | OUTPATIENT
Start: 2021-02-14 | End: 2021-02-15

## 2021-02-14 RX ORDER — FENTANYL CITRATE 50 UG/ML
100 INJECTION, SOLUTION INTRAMUSCULAR; INTRAVENOUS ONCE
Status: DISCONTINUED | OUTPATIENT
Start: 2021-02-14 | End: 2021-02-14

## 2021-02-14 RX ORDER — FENTANYL CITRATE 50 UG/ML
100 INJECTION, SOLUTION INTRAMUSCULAR; INTRAVENOUS ONCE
Status: COMPLETED | OUTPATIENT
Start: 2021-02-14 | End: 2021-02-14

## 2021-02-14 RX ORDER — IPRATROPIUM BROMIDE AND ALBUTEROL SULFATE 2.5; .5 MG/3ML; MG/3ML
3 SOLUTION RESPIRATORY (INHALATION) EVERY 4 HOURS PRN
Status: DISCONTINUED | OUTPATIENT
Start: 2021-02-14 | End: 2021-02-16 | Stop reason: HOSPADM

## 2021-02-14 RX ORDER — SODIUM CHLORIDE, SODIUM LACTATE, POTASSIUM CHLORIDE, CALCIUM CHLORIDE 600; 310; 30; 20 MG/100ML; MG/100ML; MG/100ML; MG/100ML
INJECTION, SOLUTION INTRAVENOUS CONTINUOUS
Status: DISCONTINUED | OUTPATIENT
Start: 2021-02-14 | End: 2021-02-15

## 2021-02-14 RX ORDER — IPRATROPIUM BROMIDE AND ALBUTEROL SULFATE 2.5; .5 MG/3ML; MG/3ML
3 SOLUTION RESPIRATORY (INHALATION) EVERY 4 HOURS
Status: DISCONTINUED | OUTPATIENT
Start: 2021-02-14 | End: 2021-02-16 | Stop reason: HOSPADM

## 2021-02-14 RX ORDER — FENTANYL CITRATE 50 UG/ML
100 INJECTION, SOLUTION INTRAMUSCULAR; INTRAVENOUS
Status: DISCONTINUED | OUTPATIENT
Start: 2021-02-14 | End: 2021-02-14

## 2021-02-14 RX ORDER — PROPOFOL 10 MG/ML
INJECTION, EMULSION INTRAVENOUS
Status: DISCONTINUED
Start: 2021-02-14 | End: 2021-02-14 | Stop reason: HOSPADM

## 2021-02-14 RX ORDER — DEXMEDETOMIDINE HYDROCHLORIDE 4 UG/ML
.2-1 INJECTION, SOLUTION INTRAVENOUS CONTINUOUS
Status: DISCONTINUED | OUTPATIENT
Start: 2021-02-14 | End: 2021-02-15

## 2021-02-14 RX ADMIN — FENTANYL CITRATE 100 MCG: 50 INJECTION, SOLUTION INTRAMUSCULAR; INTRAVENOUS at 12:47

## 2021-02-14 RX ADMIN — SODIUM CHLORIDE 1000 ML: 9 INJECTION, SOLUTION INTRAVENOUS at 12:54

## 2021-02-14 RX ADMIN — HYDROMORPHONE HYDROCHLORIDE 0.5 MG: 1 INJECTION, SOLUTION INTRAMUSCULAR; INTRAVENOUS; SUBCUTANEOUS at 22:02

## 2021-02-14 RX ADMIN — FAMOTIDINE 40 MG: 10 INJECTION, SOLUTION INTRAVENOUS at 12:54

## 2021-02-14 RX ADMIN — SODIUM CHLORIDE, POTASSIUM CHLORIDE, SODIUM LACTATE AND CALCIUM CHLORIDE 500 ML: 600; 310; 30; 20 INJECTION, SOLUTION INTRAVENOUS at 22:37

## 2021-02-14 RX ADMIN — HYDROMORPHONE HYDROCHLORIDE 0.5 MG: 1 INJECTION, SOLUTION INTRAMUSCULAR; INTRAVENOUS; SUBCUTANEOUS at 19:22

## 2021-02-14 RX ADMIN — DEXMEDETOMIDINE HYDROCHLORIDE 0.2 MCG/KG/HR: 4 INJECTION, SOLUTION INTRAVENOUS at 22:37

## 2021-02-14 RX ADMIN — METHYLPREDNISOLONE SODIUM SUCCINATE 125 MG: 125 INJECTION, POWDER, FOR SOLUTION INTRAMUSCULAR; INTRAVENOUS at 18:32

## 2021-02-14 RX ADMIN — DIPHENHYDRAMINE HYDROCHLORIDE 25 MG: 50 INJECTION, SOLUTION INTRAMUSCULAR; INTRAVENOUS at 17:14

## 2021-02-14 RX ADMIN — SODIUM CHLORIDE, POTASSIUM CHLORIDE, SODIUM LACTATE AND CALCIUM CHLORIDE: 600; 310; 30; 20 INJECTION, SOLUTION INTRAVENOUS at 23:37

## 2021-02-14 RX ADMIN — FENTANYL CITRATE 100 MCG: 50 INJECTION, SOLUTION INTRAMUSCULAR; INTRAVENOUS at 14:54

## 2021-02-14 RX ADMIN — MIDAZOLAM HYDROCHLORIDE 2 MG: 1 INJECTION, SOLUTION INTRAMUSCULAR; INTRAVENOUS at 14:57

## 2021-02-14 RX ADMIN — PROPOFOL 30 MCG/KG/MIN: 10 INJECTION, EMULSION INTRAVENOUS at 12:47

## 2021-02-14 RX ADMIN — PROPOFOL 35 MCG/KG/MIN: 10 INJECTION, EMULSION INTRAVENOUS at 13:31

## 2021-02-14 RX ADMIN — HYDROMORPHONE HYDROCHLORIDE 0.5 MG: 1 INJECTION, SOLUTION INTRAMUSCULAR; INTRAVENOUS; SUBCUTANEOUS at 17:15

## 2021-02-14 RX ADMIN — METHYLPREDNISOLONE SODIUM SUCCINATE 125 MG: 125 INJECTION, POWDER, FOR SOLUTION INTRAMUSCULAR; INTRAVENOUS at 12:54

## 2021-02-14 RX ADMIN — ENOXAPARIN SODIUM 40 MG: 40 INJECTION SUBCUTANEOUS at 19:22

## 2021-02-14 RX ADMIN — IPRATROPIUM BROMIDE AND ALBUTEROL SULFATE 3 ML: .5; 3 SOLUTION RESPIRATORY (INHALATION) at 19:11

## 2021-02-14 RX ADMIN — CALCIUM GLUCONATE 2 G: 98 INJECTION, SOLUTION INTRAVENOUS at 19:41

## 2021-02-14 RX ADMIN — POTASSIUM CHLORIDE 10 MEQ: 7.46 INJECTION, SOLUTION INTRAVENOUS at 15:05

## 2021-02-14 RX ADMIN — FENTANYL CITRATE 25 MCG: 50 INJECTION, SOLUTION INTRAMUSCULAR; INTRAVENOUS at 15:45

## 2021-02-14 RX ADMIN — HYDROMORPHONE HYDROCHLORIDE 0.5 MG: 1 INJECTION, SOLUTION INTRAMUSCULAR; INTRAVENOUS; SUBCUTANEOUS at 16:18

## 2021-02-14 RX ADMIN — CHLORHEXIDINE GLUCONATE 0.12% ORAL RINSE 15 ML: 1.2 LIQUID ORAL at 19:22

## 2021-02-14 RX ADMIN — POTASSIUM CHLORIDE 10 MEQ: 7.46 INJECTION, SOLUTION INTRAVENOUS at 14:11

## 2021-02-14 RX ADMIN — IPRATROPIUM BROMIDE AND ALBUTEROL SULFATE 3 ML: .5; 3 SOLUTION RESPIRATORY (INHALATION) at 16:21

## 2021-02-14 RX ADMIN — FENTANYL CITRATE 100 MCG: 50 INJECTION, SOLUTION INTRAMUSCULAR; INTRAVENOUS at 13:26

## 2021-02-14 RX ADMIN — SODIUM CHLORIDE, POTASSIUM CHLORIDE, SODIUM LACTATE AND CALCIUM CHLORIDE: 600; 310; 30; 20 INJECTION, SOLUTION INTRAVENOUS at 17:22

## 2021-02-14 ASSESSMENT — ACTIVITIES OF DAILY LIVING (ADL)
TOILETING_ISSUES: NO
FALL_HISTORY_WITHIN_LAST_SIX_MONTHS: NO
DRESSING/BATHING_DIFFICULTY: NO
WALKING_OR_CLIMBING_STAIRS_DIFFICULTY: NO
ADLS_ACUITY_SCORE: 11
DIFFICULTY_COMMUNICATING: NO
ADLS_ACUITY_SCORE: 13
CONCENTRATING,_REMEMBERING_OR_MAKING_DECISIONS_DIFFICULTY: NO
DIFFICULTY_EATING/SWALLOWING: NO
DOING_ERRANDS_INDEPENDENTLY_DIFFICULTY: NO

## 2021-02-14 ASSESSMENT — MIFFLIN-ST. JEOR: SCORE: 1186

## 2021-02-14 NOTE — ED NOTES
Bed: ST01  Expected date:   Expected time:   Means of arrival:   Comments:  [facility] 38F allergic reaction/intubated

## 2021-02-14 NOTE — ED PROVIDER NOTES
History   Chief Complaint:  Allergic Reaction    History is limited secondary to intubation. Available history is provided by EMS at bedside.     FAITH Lugo is a 35 year old female, who presents to the ED for evaluation of allergic reaction. EMS reports the patient woke up this morning and had no abnormalities. She started getting ready for her day and began drinking a new drink that she had not had before in the past. Shortly after consuming this, she complained of difficulty breathing and the spouse called EMS. En route, the patient was found unresponsive upstairs, but still breathing independently. They were assisting breaths and gave 0.3mg epinephrine IM and 50 mg Benadryl IV with no change in status. She was hypotensive to low 90s systolic. She then began to decline and was intubated as they noticed increased tongue swelling and only 3-4 natural breaths per minute. They intubated with a 5.0 tube and sedated her with etomidate and vecuronium. End tidals were 42-45 en route and the patient received two 5 mg Versed IV doses to keep the patient sedated, with the last dose at 1228 (12 minutes piror to arrival). They were bagging the patient with 12-18 respirations per minute and she had a blood pressure of 142/72 on recheck. Per their notes, the patient is allergic to amoxicillin, oranges, and pineapple.     Allergies:  Amoxicillin     Medications:    The patient is not currently taking any prescribed medications.    Past Medical History:    Gestational diabetes  Gestational thrombocytopenia    Past Surgical History:     section x3  Cholecystectomy    Family History:    The patient denies past family history.     Social History:  PCP: Adelina Aguilera  Presents to the ED via EMS  Marital Status:   [2]     Review of Systems   Unable to perform ROS: Intubated     Physical Exam     Patient Vitals for the past 24 hrs:   BP Temp Temp src Pulse Resp SpO2 Height Weight   21 1355 --  "-- -- -- -- -- 1.626 m (5' 4\") --   02/14/21 1345 134/69 -- -- 103 14 100 % -- --   02/14/21 1337 -- 98.2  F (36.8  C) Temporal -- -- -- -- --   02/14/21 1330 116/54 -- -- 95 13 100 % -- --   02/14/21 1315 (!) 142/83 -- -- 100 14 -- -- --   02/14/21 1303 -- -- -- -- -- -- -- 57 kg (125 lb 10.6 oz)   02/14/21 1300 118/62 -- -- 73 13 100 % -- 57 kg (125 lb 10.6 oz)   02/14/21 1245 (!) 142/89 -- -- 99 13 100 % -- --       Physical Exam  General/Appearance: appears stated age, well-groomed, intubated, unresponsive  Eyes: EOMI, no scleral injection, no icterus, PERRL 3mm  ENT: MMM, tongue and lower lip very edematous  Neck: supple, nl ROM, no stiffness  Cardiovascular: tachy but regular, nl S1S2, no m/r/g, 2+ pulses in all 4 extremities, cap refill <2sec  Respiratory: being bagged, clear to auscultation bilaterally  Back: no lesions  GI: abd soft, non-distended, no HSM, nl BS  MSK: no bony abnormality  Skin: warm and well-perfused, no rash, no edema, no ecchymosis, nl turgor  Neuro: GCS 3ST  Heme: no petechia, no purpura, no active bleeding    Emergency Department Course   ECG (12:48:11):  Rate 85 bpm. DE interval 162. QRS duration 96. QT/QTc 424/504. P-R-T axes 67 89 65. Normal sinus rhythm. Prolonged QT. Abnormal ECG. Interpreted at 1248 by Gladys Dunn MD.    Imaging:    XR Chest, portable, 1 view:  Tip of the endotracheal tube is 2.9 cm above the fidelia.   The lungs are clear. No pneumothorax or pleural effusion.     Imaging independently reviewed and agree with radiologist interpretation.      Laboratory:    CBC: HGB 9.5 (L), o/w WNL (WBC 8.5, )    CMP: K 2.5 (LL), Cl 111 (H),  (H), Ca 7.2 (L), Albumin 3.2 (L), Protein Total 6.4 (H), o/w WNL (Creatinine 0.64)    Salicylate Level: <2    Acetaminophen Level: 4    Magnesium: 1.8    Drug abuse screen 77 urine: Benzodiazepine Positive    HCG Qualitative: Negative    Tryptase: Pending    Interventions:  1247: Propofol 30 mcg/kg/min IV   1247: " Fentanyl 100 mcg IV  1254: Solu-Medrol 125 mg IV  1254: Pepcid 40 mg IV  1254: NS 1L IV Bolus   1326: Fentanyl 100 mcg IV  1411: Potassium chloride 10 mEq in 100 mL intermittent infusion    Emergency Department Course:  Reviewed:  I reviewed the patient's nursing notes, vitals, and available past medical records.     Assessments:  1240: I assessed the patient and performed an exam as detailed above.    1332: The  arrived and I updated him on the patient's condition, as well as obtained more history.     1345: I rechecked the patient. Patient is maintaining vital signs.     Consults:   1426: I spoke to Dr. Damon on-call for the intensivist service.    Disposition:  The patient was admitted to the hospital under the care of Dr. Damon.     Impression & Plan   Medical Decision Making:  This patient is a 35-year-old female who presents after a likely anaphylactic response at home which required intubation by EMS.  She was drinking something that is labeled in Farsi so I am unable to identify ingredients but it is aimed that upset GI symptoms.  When the  went to get her, after she went upstairs because she was starting to feel short of breath, he found her unresponsive.  Respiratory decompensated in front of EMS with a ended up having to intubate her.  As her tongue and lips were swollen they ended up using a 5-0 tube.  Gave her epi as well as Benadryl.  Here she is gotten Solu-Medrol and famotidine.  Clinically she is stable but still has significant swelling to her tongue and lip and so I think it requires ongoing sedation and intubation to allow that to dissipate.  Otherwise hemodynamically she is not requiring any support.  She is getting as needed fentanyl and Versed for sedation.  Of note the only thing that does not totally fit with this anaphylactic response is potassium of 2.5.  For this we have added on a tryptase.  I am starting to replace it and this will continue to be monitored and  replaced in the ICU.    Covid-19  Lucho Lugo was evaluated during a global COVID-19 pandemic, which necessitated consideration that the patient might be at risk for infection with the SARS-CoV-2 virus that causes COVID-19.   Applicable protocols for evaluation were followed during the patient's care.     Diagnosis:    ICD-10-CM    1. Anaphylaxis, initial encounter  T78.2XXA      Disposition:  Admitted to the ICU.    Scribe Disclosure:  I, Piotr Dela Cruz, am serving as a scribe at 12:40 PM on 2/14/2021 to document services personally performed by Gladys Dunn MD based on my observations and the provider's statements to me.        Gladys Dunn MD  02/14/21 4763

## 2021-02-14 NOTE — ED NOTES
DATE:  2/14/2021   TIME OF RECEIPT FROM LAB:  2074  LAB TEST:  K+  LAB VALUE:  2.5  RESULTS GIVEN WITH READ-BACK TO (PROVIDER):  Gladys Dunn*  TIME LAB VALUE REPORTED TO PROVIDER:   4271

## 2021-02-14 NOTE — PHARMACY-ADMISSION MEDICATION HISTORY
Pharmacy Medication History  Admission medication history interview status for the 2/14/2021  admission is complete. See EPIC admission navigator for prior to admission medications     Location of Interview: Phone  Medication history sources: Patient's family/friend (pt's son)    Significant changes made to the medication list:  Removed pataday  Added diphenhydramine    In the past week, patient estimated taking medication this percent of the time: unsure    Additional medication history information:   Pt took Brazilian herbal  drink Trachyspermum [place]ianum this morning. Does not take regularly. Only when not feeling well    Medication reconciliation completed by provider prior to medication history? No    Time spent in this activity: 10 minutes    Prior to Admission medications    Medication Sig Last Dose Taking? Auth Provider   cetirizine (ZYRTEC) 10 MG tablet Take 1 tablet (10 mg) by mouth daily Past Week at Unknown time Yes Sanchez Matamoros MD   diphenhydrAMINE (BENADRYL) 25 MG tablet Take 25 mg by mouth every 8 hours as needed for itching or allergies prn Yes Unknown, Entered By History   montelukast (SINGULAIR) 10 MG tablet TAKE 1 TABLET BY MOUTH AT BEDTIME Past Week at Unknown time Yes Sanchez Matamoros MD       The information provided in this note is only as accurate as the sources available at the time of update(s)

## 2021-02-15 ENCOUNTER — APPOINTMENT (OUTPATIENT)
Dept: GENERAL RADIOLOGY | Facility: CLINIC | Age: 36
End: 2021-02-15
Attending: INTERNAL MEDICINE
Payer: COMMERCIAL

## 2021-02-15 LAB
C PNEUM DNA SPEC QL NAA+PROBE: NOT DETECTED
CK SERPL-CCNC: 116 U/L (ref 30–225)
CRP SERPL-MCNC: 6.2 MG/L (ref 0–8)
FERRITIN SERPL-MCNC: 4 NG/ML (ref 12–150)
FLUAV H1 2009 PAND RNA SPEC QL NAA+PROBE: NOT DETECTED
FLUAV H1 RNA SPEC QL NAA+PROBE: NOT DETECTED
FLUAV H3 RNA SPEC QL NAA+PROBE: NOT DETECTED
FLUAV RNA SPEC QL NAA+PROBE: NOT DETECTED
FLUBV RNA SPEC QL NAA+PROBE: NOT DETECTED
GLUCOSE BLDC GLUCOMTR-MCNC: 123 MG/DL (ref 70–99)
GLUCOSE BLDC GLUCOMTR-MCNC: 156 MG/DL (ref 70–99)
GLUCOSE BLDC GLUCOMTR-MCNC: 181 MG/DL (ref 70–99)
HADV DNA SPEC QL NAA+PROBE: NOT DETECTED
HCOV PNL SPEC NAA+PROBE: NOT DETECTED
HMPV RNA SPEC QL NAA+PROBE: NOT DETECTED
HPIV1 RNA SPEC QL NAA+PROBE: NOT DETECTED
HPIV2 RNA SPEC QL NAA+PROBE: NOT DETECTED
HPIV3 RNA SPEC QL NAA+PROBE: NOT DETECTED
HPIV4 RNA SPEC QL NAA+PROBE: NOT DETECTED
LACTATE BLD-SCNC: 3.4 MMOL/L (ref 0.7–2)
LACTATE BLD-SCNC: 4 MMOL/L (ref 0.7–2)
LDH SERPL L TO P-CCNC: 210 U/L (ref 81–234)
M PNEUMO DNA SPEC QL NAA+PROBE: NOT DETECTED
MICROBIOLOGIST REVIEW: NORMAL
RSV RNA SPEC QL NAA+PROBE: NOT DETECTED
RSV RNA SPEC QL NAA+PROBE: NOT DETECTED
RV+EV RNA SPEC QL NAA+PROBE: NOT DETECTED
T3FREE SERPL-MCNC: 1.6 PG/ML (ref 2.3–4.2)
T4 FREE SERPL-MCNC: 1.04 NG/DL (ref 0.76–1.46)
TSH SERPL DL<=0.005 MIU/L-ACNC: 0.29 MU/L (ref 0.4–4)

## 2021-02-15 PROCEDURE — 82728 ASSAY OF FERRITIN: CPT | Performed by: INTERNAL MEDICINE

## 2021-02-15 PROCEDURE — 94640 AIRWAY INHALATION TREATMENT: CPT | Mod: 76

## 2021-02-15 PROCEDURE — 94640 AIRWAY INHALATION TREATMENT: CPT

## 2021-02-15 PROCEDURE — 999N000157 HC STATISTIC RCP TIME EA 10 MIN

## 2021-02-15 PROCEDURE — 84481 FREE ASSAY (FT-3): CPT | Performed by: INTERNAL MEDICINE

## 2021-02-15 PROCEDURE — 94003 VENT MGMT INPAT SUBQ DAY: CPT

## 2021-02-15 PROCEDURE — 999N001017 HC STATISTIC GLUCOSE BY METER IP

## 2021-02-15 PROCEDURE — 250N000011 HC RX IP 250 OP 636: Performed by: INTERNAL MEDICINE

## 2021-02-15 PROCEDURE — 120N000001 HC R&B MED SURG/OB

## 2021-02-15 PROCEDURE — 258N000003 HC RX IP 258 OP 636: Performed by: INTERNAL MEDICINE

## 2021-02-15 PROCEDURE — 84443 ASSAY THYROID STIM HORMONE: CPT | Performed by: INTERNAL MEDICINE

## 2021-02-15 PROCEDURE — 71045 X-RAY EXAM CHEST 1 VIEW: CPT

## 2021-02-15 PROCEDURE — 99223 1ST HOSP IP/OBS HIGH 75: CPT | Performed by: INTERNAL MEDICINE

## 2021-02-15 PROCEDURE — 84439 ASSAY OF FREE THYROXINE: CPT | Performed by: INTERNAL MEDICINE

## 2021-02-15 PROCEDURE — 83615 LACTATE (LD) (LDH) ENZYME: CPT | Performed by: INTERNAL MEDICINE

## 2021-02-15 PROCEDURE — 94002 VENT MGMT INPAT INIT DAY: CPT

## 2021-02-15 PROCEDURE — 999N000009 HC STATISTIC AIRWAY CARE

## 2021-02-15 PROCEDURE — 83605 ASSAY OF LACTIC ACID: CPT | Performed by: INTERNAL MEDICINE

## 2021-02-15 PROCEDURE — 99291 CRITICAL CARE FIRST HOUR: CPT | Performed by: INTERNAL MEDICINE

## 2021-02-15 PROCEDURE — 250N000009 HC RX 250: Performed by: INTERNAL MEDICINE

## 2021-02-15 PROCEDURE — 86140 C-REACTIVE PROTEIN: CPT | Performed by: INTERNAL MEDICINE

## 2021-02-15 PROCEDURE — 36415 COLL VENOUS BLD VENIPUNCTURE: CPT | Performed by: INTERNAL MEDICINE

## 2021-02-15 PROCEDURE — 82550 ASSAY OF CK (CPK): CPT | Performed by: INTERNAL MEDICINE

## 2021-02-15 RX ORDER — MONTELUKAST SODIUM 10 MG/1
10 TABLET ORAL AT BEDTIME
Status: DISCONTINUED | OUTPATIENT
Start: 2021-02-16 | End: 2021-02-16 | Stop reason: HOSPADM

## 2021-02-15 RX ORDER — MIDAZOLAM (PF) 1 MG/ML IN 0.9 % SODIUM CHLORIDE INTRAVENOUS SOLUTION
1-8 CONTINUOUS
Status: DISCONTINUED | OUTPATIENT
Start: 2021-02-15 | End: 2021-02-15

## 2021-02-15 RX ADMIN — FAMOTIDINE 20 MG: 10 INJECTION, SOLUTION INTRAVENOUS at 11:35

## 2021-02-15 RX ADMIN — IPRATROPIUM BROMIDE AND ALBUTEROL SULFATE 3 ML: .5; 3 SOLUTION RESPIRATORY (INHALATION) at 07:00

## 2021-02-15 RX ADMIN — METHYLPREDNISOLONE SODIUM SUCCINATE 125 MG: 125 INJECTION, POWDER, FOR SOLUTION INTRAMUSCULAR; INTRAVENOUS at 00:20

## 2021-02-15 RX ADMIN — METHYLPREDNISOLONE SODIUM SUCCINATE 125 MG: 125 INJECTION, POWDER, FOR SOLUTION INTRAMUSCULAR; INTRAVENOUS at 18:34

## 2021-02-15 RX ADMIN — ENOXAPARIN SODIUM 40 MG: 40 INJECTION SUBCUTANEOUS at 20:04

## 2021-02-15 RX ADMIN — IPRATROPIUM BROMIDE AND ALBUTEROL SULFATE 3 ML: .5; 3 SOLUTION RESPIRATORY (INHALATION) at 14:53

## 2021-02-15 RX ADMIN — MIDAZOLAM (PF) 1 MG/ML IN 0.9 % SODIUM CHLORIDE INTRAVENOUS SOLUTION 1 MG/HR: at 00:34

## 2021-02-15 RX ADMIN — DIPHENHYDRAMINE HYDROCHLORIDE 25 MG: 50 INJECTION, SOLUTION INTRAMUSCULAR; INTRAVENOUS at 05:18

## 2021-02-15 RX ADMIN — DIPHENHYDRAMINE HYDROCHLORIDE 25 MG: 50 INJECTION, SOLUTION INTRAMUSCULAR; INTRAVENOUS at 11:35

## 2021-02-15 RX ADMIN — FAMOTIDINE 20 MG: 10 INJECTION, SOLUTION INTRAVENOUS at 00:20

## 2021-02-15 RX ADMIN — IPRATROPIUM BROMIDE AND ALBUTEROL SULFATE 3 ML: .5; 3 SOLUTION RESPIRATORY (INHALATION) at 10:20

## 2021-02-15 RX ADMIN — SODIUM CHLORIDE, POTASSIUM CHLORIDE, SODIUM LACTATE AND CALCIUM CHLORIDE: 600; 310; 30; 20 INJECTION, SOLUTION INTRAVENOUS at 10:06

## 2021-02-15 RX ADMIN — IPRATROPIUM BROMIDE AND ALBUTEROL SULFATE 3 ML: .5; 3 SOLUTION RESPIRATORY (INHALATION) at 00:04

## 2021-02-15 RX ADMIN — DIPHENHYDRAMINE HYDROCHLORIDE 25 MG: 50 INJECTION, SOLUTION INTRAMUSCULAR; INTRAVENOUS at 00:20

## 2021-02-15 RX ADMIN — IPRATROPIUM BROMIDE AND ALBUTEROL SULFATE 3 ML: .5; 3 SOLUTION RESPIRATORY (INHALATION) at 03:04

## 2021-02-15 RX ADMIN — PROPOFOL 30 MCG/KG/MIN: 10 INJECTION, EMULSION INTRAVENOUS at 00:41

## 2021-02-15 RX ADMIN — METHYLPREDNISOLONE SODIUM SUCCINATE 125 MG: 125 INJECTION, POWDER, FOR SOLUTION INTRAMUSCULAR; INTRAVENOUS at 11:35

## 2021-02-15 RX ADMIN — IPRATROPIUM BROMIDE AND ALBUTEROL SULFATE 3 ML: .5; 3 SOLUTION RESPIRATORY (INHALATION) at 20:26

## 2021-02-15 RX ADMIN — METHYLPREDNISOLONE SODIUM SUCCINATE 125 MG: 125 INJECTION, POWDER, FOR SOLUTION INTRAMUSCULAR; INTRAVENOUS at 05:18

## 2021-02-15 RX ADMIN — IPRATROPIUM BROMIDE AND ALBUTEROL SULFATE 3 ML: .5; 3 SOLUTION RESPIRATORY (INHALATION) at 23:51

## 2021-02-15 RX ADMIN — HYDROMORPHONE HYDROCHLORIDE 0.3 MG: 1 INJECTION, SOLUTION INTRAMUSCULAR; INTRAVENOUS; SUBCUTANEOUS at 04:17

## 2021-02-15 ASSESSMENT — ACTIVITIES OF DAILY LIVING (ADL)
ADLS_ACUITY_SCORE: 13
ADLS_ACUITY_SCORE: 12

## 2021-02-15 ASSESSMENT — MIFFLIN-ST. JEOR: SCORE: 1223

## 2021-02-15 NOTE — PROGRESS NOTES
Extubation Note    Successful completion of SBT (Yes or No):yes  Extubation time:0755    Patient assessment:  Lung sounds:clear  Stridor Present (Yes or No): no  Patient tolerance:    Oxygen device:  8L 40% cool aerolsol    SpO2:95%

## 2021-02-15 NOTE — PROGRESS NOTES
Patient arrived to ICU at 1530 via cart. Three rings and ankle bracelet placed in room lockbox.     Neuro: Sedated. Pupil round and reactive. Arouses with voice and touch. Withdraws with stimulus.  Pulm: LS coarse. On full vent support  CV: ST w/ WNL BP  : Montero patent with adequate UOP  GI: Smear BM. OG on LIS. NPO.  Extremities: Purposeful movements localized  Skin: Intact  Lines: PIVx3   Family: No family present.   Plan: Rest tonight and assess for extubation vs ETT change tomorrow. Will continue to monitor.     7-11p  Hypotensive with propofol and prn dilaudid. Restless and vent dyssynchrony with lightening sedation. Precedex added. Lactic 5.8. intensivist notified and LR bolus ordered. Will continue to monitor. sliding scale insulin added for BS above 150. Will continue to monitor.

## 2021-02-15 NOTE — PROGRESS NOTES
[facility] ICU RESPIRATORY NOTE        Date of Admission: 2/14/2021    Date of Intubation (most recent): 2/14/2021    Reason for Mechanical Ventilation: AW protection     Number of Days on Mechanical Ventilation:1    Met Criteria for Spontaneous Breathing Trial: No    Reason for No Spontaneous Breathing Trial:Per MD    Significant Events Today: None overnight.    ABG Results:   Recent Labs   Lab 02/14/21 2123 02/14/21  1625   PH 7.30* 7.28*   PCO2 37 48*   PO2 170* 236*   HCO3 18* 22   O2PER 30 FIO2 50%       Current Vent Settings: Ventilation Mode: CMV/AC  (Continuous Mandatory Ventilation/ Assist Control)  FiO2 (%): 30 %  Rate Set (breaths/minute): 14 breaths/min  Tidal Volume Set (mL): 410 mL  PEEP (cm H2O): 5 cmH2O  Oxygen Concentration (%): 30 %  Resp: 15      Plan: will continue on full vent support and assess readiness for SBT in the morning.      Rigoberto Falk, RT

## 2021-02-15 NOTE — PROGRESS NOTES
[facility]: Comprehensive   Intensive Care Daily Note          Assessment and Plan:     Summary Statement:  Lucho Lugo is a 35 year old female admitted on 2/14/2021 for anaphylaxis. She has now been extubated and has remained hemodynamically stable.     Neurology:  Alert and orientedx3 after weaning of sedation and extubation  -Continue to monitor mental status     Cardiovascular/Hemodynamics:  #Prolonged QT  Prolonged QT was seen on EKG yesterday in the setting of hypokalemia, which has now been corrected.   -Repeat EKG    #Hypotension  Intermittently hypotensive overnight. Since sedation has been weaned, pressures have been normal.  -Continue to monitor    Pulmonary:  #Anaphylaxis  #Acute respiratory failure  Has been extubated and is tolerating 8L 40% FiO2. Patient still has wheezing on exam, but is responding well to treatment. Breathing very comfortably.  -Benadryl doses complete  -Solumedrol 125mg IV Q6H until 2/16  -Duoneb Q4H    GI/Nutrition:  -Famotidine 20mg Q12H prophylaxis    Renal/Fluid/Electrolytes:  #Hypokalemia  This hypokalemia was likely secondary to epinephrine received for anaphylaxis. Potassium is now at a normal level.  -Continue to monitor  -High potassium replacement protocol  -High magnesium replacement protocol  -High phosphorus replacement protocol    #Elevated lactic acid  This may have been due to epinephrine, electrolyte abnormalities, or propofol infusion syndrome. Propofol has been discontinued, and electrolytes have been corrected.  -Recheck lactic acid    #Hypocalcemia  Replaced    Infectious Disease:  She has remained afebrile and has not had any infectious symptoms. Blood cultures, respiratory virus panel, and urinalysis negative.   No concerns at this time.     Hematology and Oncology:  #Microcytic anemia  Chronic due to iron deficiency. Ferritin low.  -Follow up outpatient    Endocrinology:  History of gestational diabetes. No concerns at this  "time.  -ICU insulin protocol    Intensive Care/Lines:  DVT prophylaxis: Lovenox 40mg Qday  Stress ulcer prophylaxis: Famotidine    Billin min spent for critical care exclusive of time for procedure           Hospital Course: date:  procedure, complication, diagnosis, treatment     Lucho Lugo is a 35 year old female with known allergies to pineapple, oranges, and amoxicillin who was admitted to the ICU on  for anaphylaxis after eating breakfast and ingesting an herbal medication called asamodagam. She was sedated and intubated in the field after EMS found her in respiratory distress, and she was given epinephrineprior to hospital arrival. In the hospital, she was given benadryl, solumedrol, and Duonebs for her breathing. She was extubated on the morning of 2/15, and tolerated extubation well.  Incidentally, Lucho was also found to be hypokalemic to 2.5 on admission, which corrected with potassium replacement and was likely secondary to the epinephrine she received.          Key events/ Interval history - last 24 hours:    Was alternating between being over-sedated and agitated while intubated  Intermittently tachycardic and tachypnic when agitated while intubated  Tolerated extubation well            Problem list:   Known allergies to oranges, pineapple, and amoxicillin  Asthma            Medications:   I have reviewed this patient's current medications            Physical Exam:   Blood pressure 118/63, pulse 131, temperature 99.1  F (37.3  C), resp. rate 22, height 1.626 m (5' 4\"), weight 54.8 kg (120 lb 13 oz), SpO2 100 %, unknown if currently breastfeeding.      Vital Sign Ranges  Temperature Temp  Av.6  F (37  C)  Min: 95.5  F (35.3  C)  Max: 99.5  F (37.5  C)   Blood pressure Systolic (24hrs), Av , Min:84 , Max:142        Diastolic (24hrs), Av, Min:44, Max:89      Pulse Pulse  Av  Min: 73  Max: 134   Respirations Resp  Av.7  Min: 10  Max: 41   Pulse oximetry SpO2  Avg: " 100 %  Min: 100 %  Max: 100 %         Intake/Output Summary (Last 24 hours) at 2/15/2021 0801  Last data filed at 2/15/2021 0600  Gross per 24 hour   Intake 1456.77 ml   Output 2855 ml   Net -1398.23 ml     GENERAL:  Healthy young female resting comfortably in bed with oxymask in place    HEENT:  No tongue or lip swelling    Chest and Lungs:  Wheezing present bilaterally, breathing comfortable on oxymask    Cardiovascular:  Regular rhythm, tachycardic, no murmurs, peripheral pulses strong    Abdomen:  Soft and nontender    :  Adequate urine output with west catheter in place    Musculoskeletal:  Moves all extremities    Extremities and skin:  Peripheral pulses in tact, extremities warm and well-perfused    Neuro:  Responds to commands, looking around room, not agitated    IVs/Tubes:  Three peripheral IVs in place, west catheter                 Data:     Lab Results   Component Value Date    WBC 8.5 02/14/2021    HGB 9.5 (L) 02/14/2021    HCT 33.0 (L) 02/14/2021     02/14/2021     (H) 02/14/2021    POTASSIUM 4.5 02/14/2021    CHLORIDE 117 (H) 02/14/2021    CO2 24 02/14/2021    BUN 8 02/14/2021    CR 0.49 (L) 02/14/2021     (H) 02/14/2021    TROPI  08/20/2016     <0.015  The 99th percentile for upper reference range is 0.045 ug/L.  Troponin values in   the range of 0.045 - 0.120 ug/L may be associated with risks of adverse   clinical events.      AST 16 02/14/2021    ALT 18 02/14/2021    ALKPHOS 49 02/14/2021    BILITOTAL 0.2 02/14/2021     Lactic acid: 5.8      All imaging results reviewed past 24 hours  All cardiac studies reviewed by me.  Chest x-ray: (2/15 am)   Normal- no infiltrates, effusions, pneumothoraces, cardiomegaly or masses  ET tube 6.5cm above fidelia     Silvia Soni, MS4    Attending Intensivist Note  I fully assessed patient myself including history, physical exam, and review of data. I also discussed case with Silvia Soni and am grateful for her fine care.     On exam  "this morning her lungs showed mild wheezing and the rest of her physical exam was unremarkable. I stood at bedside with respiratory and nursing and did a \"leak test\" with ETT cuff down on 5.0 ETT. She was able to phonate and cough around the ETT with a very good air leak. She was successfully extubated afterwards.     Within the next few hours she was awake and alert, and on room air, and doing well.    She had acute respiratory failure due to anaphylaxis likely precipitated by a food substance she ingested.     I spent 30 minutes of critical care time with her.     josseline murrieta  February 17, 2021            "

## 2021-02-15 NOTE — PROGRESS NOTES
7a-3p     Neuro: Confused with situation. Calm and follow commands  Pulm: LS diminished. Infrequent dry cough. On RA.   CV: ST w/ WNL BP  : Due to void   GI: On regular diet. No appetite due to sore throat.   Extremities: Generalized weakness. A/1 for mobility  Skin: Intact  Lines: PIVx3  Family:  updated at bedside  Plan: Planning to transfer out of ICU and discharge planning.

## 2021-02-15 NOTE — PLAN OF CARE
Intubated and sedated. Propofol weaned down and versed started d/t hypotension. Becomes agitated/gags on tube at times. Pupils equal and reactive. Does not follow commands. Tachycardic 110-130's. Montero patent with adequate output. OG to LIS. AM labs pending.

## 2021-02-16 VITALS
OXYGEN SATURATION: 96 % | RESPIRATION RATE: 20 BRPM | HEART RATE: 89 BPM | SYSTOLIC BLOOD PRESSURE: 111 MMHG | BODY MASS INDEX: 19.92 KG/M2 | WEIGHT: 116.7 LBS | TEMPERATURE: 98.3 F | HEIGHT: 64 IN | DIASTOLIC BLOOD PRESSURE: 66 MMHG

## 2021-02-16 LAB
ANION GAP SERPL CALCULATED.3IONS-SCNC: 7 MMOL/L (ref 3–14)
BUN SERPL-MCNC: 18 MG/DL (ref 7–30)
CALCIUM SERPL-MCNC: 9 MG/DL (ref 8.5–10.1)
CHLORIDE SERPL-SCNC: 108 MMOL/L (ref 94–109)
CO2 SERPL-SCNC: 23 MMOL/L (ref 20–32)
CORTIS SERPL-MCNC: 22.2 UG/DL (ref 4–22)
CREAT SERPL-MCNC: 0.61 MG/DL (ref 0.52–1.04)
ERYTHROCYTE [DISTWIDTH] IN BLOOD BY AUTOMATED COUNT: 18.5 % (ref 10–15)
GFR SERPL CREATININE-BSD FRML MDRD: >90 ML/MIN/{1.73_M2}
GLUCOSE SERPL-MCNC: 223 MG/DL (ref 70–99)
HCT VFR BLD AUTO: 30.7 % (ref 35–47)
HGB BLD-MCNC: 9 G/DL (ref 11.7–15.7)
LACTATE BLD-SCNC: 3.9 MMOL/L (ref 0.7–2)
MCH RBC QN AUTO: 20.6 PG (ref 26.5–33)
MCHC RBC AUTO-ENTMCNC: 29.3 G/DL (ref 31.5–36.5)
MCV RBC AUTO: 70 FL (ref 78–100)
PLATELET # BLD AUTO: 184 10E9/L (ref 150–450)
POTASSIUM SERPL-SCNC: 4.1 MMOL/L (ref 3.4–5.3)
RBC # BLD AUTO: 4.37 10E12/L (ref 3.8–5.2)
SODIUM SERPL-SCNC: 138 MMOL/L (ref 133–144)
WBC # BLD AUTO: 13 10E9/L (ref 4–11)

## 2021-02-16 PROCEDURE — 36415 COLL VENOUS BLD VENIPUNCTURE: CPT | Performed by: INTERNAL MEDICINE

## 2021-02-16 PROCEDURE — 250N000011 HC RX IP 250 OP 636: Performed by: INTERNAL MEDICINE

## 2021-02-16 PROCEDURE — 82533 TOTAL CORTISOL: CPT | Performed by: INTERNAL MEDICINE

## 2021-02-16 PROCEDURE — 94640 AIRWAY INHALATION TREATMENT: CPT

## 2021-02-16 PROCEDURE — 250N000009 HC RX 250: Performed by: INTERNAL MEDICINE

## 2021-02-16 PROCEDURE — 83605 ASSAY OF LACTIC ACID: CPT | Performed by: INTERNAL MEDICINE

## 2021-02-16 PROCEDURE — 99239 HOSP IP/OBS DSCHRG MGMT >30: CPT | Performed by: HOSPITALIST

## 2021-02-16 PROCEDURE — 85027 COMPLETE CBC AUTOMATED: CPT | Performed by: INTERNAL MEDICINE

## 2021-02-16 PROCEDURE — 250N000013 HC RX MED GY IP 250 OP 250 PS 637: Performed by: INTERNAL MEDICINE

## 2021-02-16 PROCEDURE — 80048 BASIC METABOLIC PNL TOTAL CA: CPT | Performed by: INTERNAL MEDICINE

## 2021-02-16 PROCEDURE — 258N000003 HC RX IP 258 OP 636: Performed by: HOSPITALIST

## 2021-02-16 RX ORDER — EPINEPHRINE 0.3 MG/.3ML
0.3 INJECTION SUBCUTANEOUS PRN
Qty: 2 EACH | Refills: 1 | Status: SHIPPED | OUTPATIENT
Start: 2021-02-16

## 2021-02-16 RX ORDER — SODIUM CHLORIDE 9 MG/ML
INJECTION, SOLUTION INTRAVENOUS CONTINUOUS
Status: ACTIVE | OUTPATIENT
Start: 2021-02-16 | End: 2021-02-16

## 2021-02-16 RX ORDER — FERROUS SULFATE 325(65) MG
325 TABLET, DELAYED RELEASE (ENTERIC COATED) ORAL 2 TIMES DAILY
Qty: 60 TABLET | Refills: 0 | Status: SHIPPED | OUTPATIENT
Start: 2021-02-16 | End: 2022-04-05

## 2021-02-16 RX ADMIN — DOCUSATE SODIUM 100 MG: 100 CAPSULE, LIQUID FILLED ORAL at 08:46

## 2021-02-16 RX ADMIN — METHYLPREDNISOLONE SODIUM SUCCINATE 125 MG: 125 INJECTION, POWDER, FOR SOLUTION INTRAMUSCULAR; INTRAVENOUS at 00:17

## 2021-02-16 RX ADMIN — METHYLPREDNISOLONE SODIUM SUCCINATE 125 MG: 125 INJECTION, POWDER, FOR SOLUTION INTRAMUSCULAR; INTRAVENOUS at 06:25

## 2021-02-16 RX ADMIN — SODIUM CHLORIDE: 9 INJECTION, SOLUTION INTRAVENOUS at 11:00

## 2021-02-16 RX ADMIN — IPRATROPIUM BROMIDE AND ALBUTEROL SULFATE 3 ML: .5; 3 SOLUTION RESPIRATORY (INHALATION) at 07:53

## 2021-02-16 RX ADMIN — METHYLPREDNISOLONE SODIUM SUCCINATE 125 MG: 125 INJECTION, POWDER, FOR SOLUTION INTRAMUSCULAR; INTRAVENOUS at 12:37

## 2021-02-16 RX ADMIN — FAMOTIDINE 20 MG: 10 INJECTION, SOLUTION INTRAVENOUS at 00:17

## 2021-02-16 ASSESSMENT — ACTIVITIES OF DAILY LIVING (ADL)
ADLS_ACUITY_SCORE: 12
ADLS_ACUITY_SCORE: 10
ADLS_ACUITY_SCORE: 12
ADLS_ACUITY_SCORE: 12

## 2021-02-16 ASSESSMENT — MIFFLIN-ST. JEOR: SCORE: 1204.35

## 2021-02-16 NOTE — PROVIDER NOTIFICATION
MD Notification    Notified Person: MD    Notified Person Name: Alexandra    Notification Date/Time: 2/16/21 6322    Notification Interaction: text/page     Purpose of Notification:  C/o chest pain on expiration.     Orders Received: MD aware. States pt also spoke to her. Possibly residual from the intubation.    Comments:

## 2021-02-16 NOTE — CONSULTS
Consult Date:  02/14/2021      PRIMARY CARE PHYSICIAN:  Sanchez Delatorre MD      CHIEF COMPLAINT:  Allergic reaction, acute respiratory failure.      CONSULTING PHYSICIAN:  Rosas Damon MD      Consult requested to resume post-extubation care      HISTORY OF PRESENT ILLNESS:   Lucho Lugo is a 35-year-old female who has ALLERGIES TO ORANGES, PINEAPPLE AND AMOXICILLIN THAT ARE DOCUMENTED.  She also was noted to have severe allergic reactions in the past.  There was also a question about affordability of an EpiPen for her that was discussed prior to this event.  The patient yesterday morning was getting ready for her day and had a new drink that she has not had before.  Shortly after consuming this she complained of difficulty breathing and her  call EMS.  En route, the patient was found to be unresponsive, up stares, but still breathing.  She was given epinephrine and Benadryl without any improvement.  She was also hypotensive as well and she began to decline.  She was intubated on the field noting increased tongue swelling.  She was intubated and sedated and paralyzed and brought to [facility] for further assessment.      In the Emergency Department, the patient was seen by Dr. Umu Dunn.  Chest x-ray showed lungs to be clear, no pneumothorax or effusion.  White count was 8.5, hemoglobin 9.5, platelets 198.  Potassium was 2.5.  Glucose was 175, calcium 7.2, albumin is 3.2.  Salicylate level was less than 2, Tylenol was 4.  Magnesium 1.8.  HCG was negative.  Drug abuse screen was positive for benzodiazepine.  The patient was started on a propofol drip.  She was given fentanyl, Solu-Medrol and Pepcid and a liter of normal saline and was admitted to the intensivist intubated.  The patient overnight was observed and was subsequently extubated and remained hemodynamically stable.  She has remained oriented x3.  She has some mild residual throat swelling but denies abdominal pain  or chest pain.  Her prolonged QT was also corrected after her potassium was replaced.  Her blood pressures have now normalized.  She continues on IV Solu-Medrol.  She has completed her Benadryl doses.  She continues to receive famotidine.  She is on potassium, magnesium, potassium, and phosphorus replacement protocols.  Her lactic acid is down trending.  The patient is anxious and is asking if she can go home tomorrow.  The patient is currently being transferred out of the ICU to a general medical floor.      PAST MEDICAL HISTORY:   1. Gestational diabetes and gestational thrombocytopenia.   2. History of severe allergic reaction.      PAST SURGICAL HISTORY:   x 3, cholecystectomy.      FAMILY HISTORY:  Reviewed and negative.      SOCIAL HISTORY:  .  No tobacco or alcohol.      ALLERGIES:  AMOXICILLIN.      CURRENT MEDICATIONS:  None.      REVIEW OF SYSTEMS:  Ten points reviewed and as dictated in the history of present illness.      PHYSICAL EXAMINATION:   VITAL SIGNS:  Temperature 98.2, heart rate 70, respirations 20, blood pressure 112/65, sats 100% on room air.   GENERAL:  The patient is alert, thin medium-built 35-year-old [place]  female.   HEENT:  She has a slight alteration of her voice that she tells me, but no stridor.  Pupils are equal.  Sclerae are anicteric.  Mucous membranes are moist.  No pharyngeal edema noted, but she still does not quite feel like her throat is back to normal, just some mild residual swelling.  Her tongue is back to normal size.   NECK:  JVP is not elevated.  No cervical lymphadenopathy.  No stridor.   PULMONARY:  Lungs are clear.   CARDIOVASCULAR:  S1, S2, regular rate and rhythm.   GASTROINTESTINAL:  Soft, nontender, normoactive bowel sounds.   MUSCULOSKELETAL:  No edema.   NEUROLOGIC:  She is grossly nonfocal.   DERMATOLOGIC:  Skin is warm, dry, well perfused, no rash.   PSYCHIATRIC:  Mood and affect stable.      LABORATORY AND IMAGING STUDIES:  As dictated in  history of present illness.      ASSESSMENT AND PLAN:  Lucho Lugo is 35 with severe allergic reaction to a new drink that she has had anaphylactic reaction, acute respiratory failure requiring on the table intubation with tongue swelling and airway swelling now extubated after 24 hours on IV steroids, Benadryl and Pepcid with hypokalemia, being corrected now and is hemodynamically stable.   1. Allergic reaction.  The patient has had prior severe reactions in the past and it was noted that she felt that she could not afford an EpiPen in the past.  The patient is strongly advised on followup with an allergist as an outpatient.  She will definitely need to be discharged with an EpiPen at the time of discharge.  She is currently on IV Solu-Medrol.  We will switch would like to switch her over to Medrol Dosepak starting tomorrow morning pending stability.  She has completed her Benadryl treatment, would continue IV or oral Pepcid.  She has DuoNeb every 4 hours.  Her lungs appear to be clear.  Currently, she still has some mild residual throat sensation.  Her voice is almost back to baseline, but not quite there.  The patient will be treated with Singulair at bedtime as well.   2. Mild lactic acidosis:  The patient's lactic acidosis coming down primarily due to her acute anaphylactic reaction.  The patient is currently tolerating a regular diet, will recheck lactic acid in the morning.  No evidence of any infection.   3. Hypokalemia.  This was replaced.  We will repeat BMP in the morning.   4. Acute respiratory failure.  The patient was intubated for anaphylactic reaction.  Currently, she is extubated and is now maintaining normal oxygen saturations.  We will continue the patient on albuterol nebs while hospitalized.   5. Deep venous thrombosis prophylaxis.  The patient will have compression boots.   6. CODE STATUS:  Full.            EMILY MENARD MD             D: 02/16/2021   T: 02/16/2021   MT: CAREN       Name:     ZORAIDA PENDLETON   MRN:      [id number]        Account:       [id number]   :      1985           Consult Date:  2021      Document: [id number]       cc: Sanchez Matamoros MD

## 2021-02-16 NOTE — PLAN OF CARE
Summary:   Pt is here with anaphylaxis after ingesting herbal tea used for GI upset in [facility] which might have contained citrus in it. Pt with extensive list of food allergies including citrus.  DATE & TIME: 2/15/2021  PM shift  Cognitive Concerns/ Orientation : AXOX4  BEHAVIOR & AGGRESSION TOOL COLOR: Green  CIWA SCORE: NA  ABNL VS/O2: VSS on RA  MOBILITY: Up ind  PAIN MANAGMENT: Denies  DIET: Regular  BOWEL/BLADDER: Continent  ABNL LAB/BG: Elevated LA, trending thought to be 2/2 epinephrine  DRAIN/DEVICES: 3 PIVs Sled  TELEMETRY RHYTHM: NA  SKIN: Intact  TESTS/PROCEDURES: NA  D/C DAY/GOALS/PLACE: Possibly tomorrow  OTHER IMPORTANT INFO: Transferred out of ICU this shift. Speaks Maori, one of the official languages of [facility], speaks adequate English.

## 2021-02-16 NOTE — PROGRESS NOTES
Alert and oriented, on RA. Patient transferred to 66. Report given to MELANIE Ghosh. Pt transferred with belongings including jewelry and cell phone.

## 2021-02-16 NOTE — PLAN OF CARE
Summary:   Pt is here with anaphylaxis after ingesting herbal tea used for GI upset in [facility] which might have contained citrus in it. Pt with extensive list of food allergies including citrus.  DATE & TIME: 2/16/21 [id number]  Cognitive Concerns/ Orientation : A&O X4  BEHAVIOR & AGGRESSION TOOL COLOR: Green  CIWA SCORE: NA  ABNL VS/O2: VSS on RA  MOBILITY: SBA due to mild dizziness during the night when stepping onto the scale.  PAIN MANAGMENT: Denies  DIET: Regular  BOWEL/BLADDER: Continent  ABNL LAB/BG: Elevated MD BUD aware, recheck this morning.  DRAIN/DEVICES: 3 PIVs saline locked  TELEMETRY RHYTHM: NA  SKIN: Intact  TESTS/PROCEDURES: NA  D/C DAY/GOALS/PLACE: Possibly tomorrow  OTHER IMPORTANT INFO: Transferred out of ICU this yesterday evening (2/15). Speaks Yoruba as well as adequate English. Continues on IV Solu-medrol.

## 2021-02-16 NOTE — PROGRESS NOTES
Care Management Discharge Note    Discharge Date: 02/16/21       Discharge Disposition: Home with spouse     Discharge Services: None     Discharge DME:      Discharge Transportation:  Spouse    Private pay costs discussed: insurance costs out of pocket expenses         Education Provided on the Discharge Plan:  With patient and spouse  Persons Notified of Discharge Plans: Spouse  Patient/Family in Agreement with the Plan: yes     Handoff Referral Completed: Yes  Faxed information to Allergy clinic    Additional Information: Patient discharged today with spouse,discussed follow up with allergy clinic. Appointment made with [facility] [phone]. Patient was advised not to take any antihistamines 1 week prior to the appointment.    Lesia Millan RN  Inpatient Care Coordinator  [facility] [place]  #[phone]

## 2021-02-16 NOTE — DISCHARGE SUMMARY
Discharge Summary  Hospitalist    Date of Admission:  2/14/2021  Date of Discharge:  2/16/2021  Discharging Provider: Bhavya Morris MD  Date of Service (when I saw the patient): 02/16/21    Discharge Diagnoses   Anaphylaxis with acute respiratory failure  Lactic acidosis  Hypokalemia  Iron deficiency anemia      History of Present Illness   Please refer H & P for details.      Hospital Course   Lucho Lugo is 35 with severe allergic reaction to a new drink/herbal medication to which she has had anaphylactic reaction, acute respiratory failure requiring  intubation with tongue swelling and airway swelling now extubated after 24 hours on IV steroids, Benadryl and Pepcid with hypokalemia, being corrected now and is hemodynamically stable.   1. Allergic reaction, anaphylaxis.  The patient has had prior severe reactions in the past and it was noted that she felt that she could not afford an EpiPen in the past.  The patient is strongly advised on followup with an allergist as an outpatient.    As mentioned above, she was managed with IV Solu-Medrol, Benadryl and famotidine.  Steroids discontinued at discharge.  Resume PTA as needed Benadryl, cetirizine and Singulair.  She is prescribed EpiPen pack at discharge.  Referral to allergist sent.  2. Mild lactic acidosis:  The patient's lactic acidosis most likely secondary to anaphylaxis, epinephrine administration.  LFTs okay.  No evidence of any infection.  She was otherwise stable with stable vitals and eating well on day of discharge.  3. Hypokalemia.  This was replaced.    Resolved.  4. Acute respiratory failure.  The patient was intubated for anaphylactic reaction.  Currently, she is extubated and is now maintaining normal oxygen saturations.  We will continue the patient on albuterol nebs while hospitalized.  Satting well on room air at discharge.  5. Deep venous thrombosis prophylaxis.  The patient will have compression boots.   6. CODE STATUS:  Full.   7.   Chronic anemia, microcytic: Secondary to iron deficiency.  Hemoglobin at 9.  No evidence of any blood loss.  Will start on iron supplement.  Ferritin low at 4.  Further management per PCP.  Consider giving her IV iron however held off on this given her current recovery from recent anaphylaxis.  Will instead continue on oral iron supplement.  Follow-up as outpatient.       Patient is stable at discharge.  She did express some concern regarding discomfort in upper palate area in her mouth, mild central chest discomfort with breathing.  This is most likely due to her recently being intubated.  Vitals are stable and she was not hypoxic.    Bhavya Morris MD, MD      Pending Results   These results will be followed up by Hospitalist team.  Unresulted Labs Ordered in the Past 30 Days of this Admission     Date and Time Order Name Status Description    2/14/2021 1820 Blood culture Preliminary     2/14/2021 1820 Blood culture Preliminary     2/14/2021 1630 C1-Esterase Inhibitor Level In process     2/14/2021 1252 Tryptase In process           Code Status   Full Code       Primary Care Physician   Adelina Aguilera    Follow-ups Needed After Discharge   Follow-up Appointments     Follow-up and recommended labs and tests       Follow up with primary care provider, Adelina Aguilera, within 7 days   for hospital follow- up.  No follow up labs or test are needed.  Follow-up with allergy specialist [patient requests allergist in   [place]] in 2-3 weeks.             Physical Exam   Temp: 98.5  F (36.9  C) Temp src: Oral BP: 106/68 Pulse: 97   Resp: 16 SpO2: 99 % O2 Device: None (Room air)    Vitals:    02/14/21 1547 02/15/21 0600 02/16/21 0500   Weight: 51.1 kg (112 lb 10.5 oz) 54.8 kg (120 lb 13 oz) 52.9 kg (116 lb 11.2 oz)     Vital Signs with Ranges  Temp:  [98.3  F (36.8  C)-98.5  F (36.9  C)] 98.5  F (36.9  C)  Pulse:  [] 97  Resp:  [11-29] 16  BP: ()/(50-68) 106/68  SpO2:  [99 %-100 %] 99  %  I/O last 3 completed shifts:  In: 1392.35 [I.V.:1392.35]  Out: 950 [Urine:950]    Constitutional: Alert, cooperative, no apparent distress  Respiratory: Non labored breathing, clear to auscultation bilaterally, no crackles or wheezing  Cardiovascular: Regular rate and rhythm, no murmurs, no edema  GI: Normal bowel sounds, soft, non-distended, non-tender  Skin: No obvious rash  Neuro: Alert, engages in appropriate conversation, fluent speech, moving all extremities, no facial asymmetry  Psych: Calm and pleasant, no obvious anxiety/ depression      Discharge Disposition   Discharged to home  Condition at discharge: Stable    Consultations This Hospital Stay   RESPIRATORY CARE IP CONSULT    Time Spent on this Encounter   IBhavya MD, personally saw the patient today and spent greater than 30 minutes discharging this patient.    Discharge Orders      Reason for your hospital stay    You were hospitalized with anaphylaxis/ severe allergic reaction.     Follow-up and recommended labs and tests     Follow up with primary care provider, Adelina Aguilera, within 7 days for hospital follow- up.  No follow up labs or test are needed.  Follow-up with allergy specialist [patient requests allergist in [facility]] in 2-3 weeks.     Activity    Your activity upon discharge: activity as tolerated     When to contact your care team    Call your primary doctor if you have any of the following: Worsening shortness of breath, wheezing, swelling in lips/tongue/throat, fever/abdominal pain/nausea/vomiting     Discharge Instructions    You have been prescribed with EpiPen to be used as needed.  Please follow the instructions on packaging.  If you experience any sort of allergic response manifested as swelling of lips/throat/tongue, difficulty breathing, wheezing, please administer EpiPen injection and seek medical attention as soon as possible/go to nearest ER.     Full Code     Diet    Follow this diet upon  discharge: Orders Placed This Encounter      Regular Diet Adult     Discharge Medications   Current Discharge Medication List      START taking these medications    Details   EPINEPHrine (ANY BX GENERIC EQUIV) 0.3 MG/0.3ML injection 2-pack Inject 0.3 mLs (0.3 mg) into the muscle as needed for anaphylaxis  Qty: 2 each, Refills: 1    Associated Diagnoses: Anaphylaxis, initial encounter         CONTINUE these medications which have NOT CHANGED    Details   cetirizine (ZYRTEC) 10 MG tablet Take 1 tablet (10 mg) by mouth daily  Qty: 30 tablet, Refills: 3    Associated Diagnoses: Allergic conjunctivitis, bilateral      diphenhydrAMINE (BENADRYL) 25 MG tablet Take 25 mg by mouth every 8 hours as needed for itching or allergies      montelukast (SINGULAIR) 10 MG tablet TAKE 1 TABLET BY MOUTH AT BEDTIME  Qty: 90 tablet, Refills: 1    Associated Diagnoses: Allergic conjunctivitis, bilateral           Allergies   Allergies   Allergen Reactions     Pineapple Anaphylaxis     Orange Fruit [Citrus]      Amoxicillin Rash     Data   Most Recent 3 CBC's:  Recent Labs   Lab Test 02/16/21  0805 02/14/21  1252 11/04/19  1218   WBC 13.0* 8.5 5.6   HGB 9.0* 9.5* 10.0*   MCV 70* 70* 73*    198 182      Most Recent 3 BMP's:  Recent Labs   Lab Test 02/16/21  0805 02/14/21  1721 02/14/21  1252    145* 141   POTASSIUM 4.1 4.5 2.5*   CHLORIDE 108 117* 111*   CO2 23 24 23   BUN 18 8 10   CR 0.61 0.49* 0.64   ANIONGAP 7 4 7   SONDRA 9.0 6.3* 7.2*   * 133* 175*     Most Recent 2 LFT's:  Recent Labs   Lab Test 02/14/21  1252 08/20/16 2035   AST 16 20   ALT 18 34   ALKPHOS 49 71   BILITOTAL 0.2 0.3     Most Recent INR's and Anticoagulation Dosing History:  Anticoagulation Dose History     There is no flowsheet data to display.        Most Recent 3 Troponin's:  Recent Labs   Lab Test 08/20/16 2035   TROPI <0.015  The 99th percentile for upper reference range is 0.045 ug/L.  Troponin values in   the range of 0.045 - 0.120 ug/L may  be associated with risks of adverse   clinical events.       Most Recent Cholesterol Panel:No lab results found.  Most Recent 6 Bacteria Isolates From Any Culture (See EPIC Reports for Culture Details):  Recent Labs   Lab Test 02/14/21 2203 02/14/21  2159   CULT No growth after 1 day No growth after 1 day     Most Recent TSH, T4 and A1c Labs:  Recent Labs   Lab Test 02/15/21  0606   TSH 0.29*   T4 1.04       Results for orders placed or performed during the hospital encounter of 02/14/21   XR Chest Port 1 View    Narrative    CHEST ONE VIEW PORTABLE February 14, 2021 12:59 PM     HISTORY: Intubated.    COMPARISON: 8/20/2016.      Impression    IMPRESSION: Tip of the endotracheal tube is 2.9 cm above the fidelia.  The lungs are clear. No pneumothorax or pleural effusion.    KYA COATS MD   XR Chest Port 1 View    Narrative    EXAM: XR CHEST PORT 1 VW  LOCATION: [facility]  DATE/TIME: 2/15/2021 5:00 AM    INDICATION: Respiratory failure.  COMPARISON: 02/14/2021.    FINDINGS: Semiupright portable chest. ET tube approximately 6.5 cm above the fidelia. NG tube tip is in the stomach. No pneumothorax. The heart size is normal. The lungs are clear.      Impression    IMPRESSION: ET tube 6.5 cm above the fidelia.

## 2021-02-16 NOTE — PLAN OF CARE
DATE & TIME: 2/16/21 Days  Cognitive Concerns/ Orientation : AOx4   BEHAVIOR & AGGRESSION TOOL COLOR: Green  CIWA SCORE: NA   ABNL VS/O2: VSS on RA  MOBILITY: IND  PAIN MANAGMENT: Denies pain  DIET: Reg  BOWEL/BLADDER: Continent of B&B  ABNL LAB/BG: Lactic elevated 3.9  DRAIN/DEVICES: 3 PIV   TELEMETRY RHYTHM: NA  SKIN: CDI  TESTS/PROCEDURES: NA  D/C DAY/GOALS/PLACE: Discharging to home today  OTHER IMPORTANT INFO: Pt was c/o chest pain on expiration 2/10, MD aware -- please note pt was intubated then extubated.   COMMIT TO SIT DONE AND SIGNED OFF Yes

## 2021-02-16 NOTE — PROVIDER NOTIFICATION
MD Notification    Notified Person: MD    Notified Person Name: Ovian    Notification Date/Time: 2/16/21 @ 0900    Notification Interaction: text     Purpose of Notification: FYI Lactic 3.9

## 2021-02-17 LAB — TRYPTASE SERPL-MCNC: 17.2 UG/L

## 2021-02-20 ENCOUNTER — HOSPITAL ENCOUNTER (EMERGENCY)
Facility: CLINIC | Age: 36
Discharge: HOME | End: 2021-02-20
Attending: EMERGENCY MEDICINE | Admitting: EMERGENCY MEDICINE
Payer: COMMERCIAL

## 2021-02-20 ENCOUNTER — APPOINTMENT (OUTPATIENT)
Dept: CT IMAGING | Facility: CLINIC | Age: 36
End: 2021-02-20
Attending: EMERGENCY MEDICINE
Payer: COMMERCIAL

## 2021-02-20 ENCOUNTER — OFFICE VISIT (OUTPATIENT)
Dept: URGENT CARE | Facility: URGENT CARE | Age: 36
End: 2021-02-20
Payer: COMMERCIAL

## 2021-02-20 VITALS
HEART RATE: 97 BPM | SYSTOLIC BLOOD PRESSURE: 112 MMHG | OXYGEN SATURATION: 100 % | DIASTOLIC BLOOD PRESSURE: 67 MMHG | RESPIRATION RATE: 22 BRPM | TEMPERATURE: 98 F

## 2021-02-20 VITALS
DIASTOLIC BLOOD PRESSURE: 80 MMHG | OXYGEN SATURATION: 97 % | TEMPERATURE: 98 F | RESPIRATION RATE: 20 BRPM | HEART RATE: 78 BPM | SYSTOLIC BLOOD PRESSURE: 139 MMHG

## 2021-02-20 DIAGNOSIS — R52 BODY ACHES: ICD-10-CM

## 2021-02-20 DIAGNOSIS — R42 DIZZINESS: ICD-10-CM

## 2021-02-20 DIAGNOSIS — R53.83 FATIGUE, UNSPECIFIED TYPE: ICD-10-CM

## 2021-02-20 DIAGNOSIS — R06.02 SHORTNESS OF BREATH: ICD-10-CM

## 2021-02-20 DIAGNOSIS — T78.40XA ALLERGIC REACTION, INITIAL ENCOUNTER: Primary | ICD-10-CM

## 2021-02-20 LAB
ALBUMIN SERPL-MCNC: 4 G/DL (ref 3.4–5)
ALP SERPL-CCNC: 59 U/L (ref 40–150)
ALT SERPL W P-5'-P-CCNC: 71 U/L (ref 0–50)
ANION GAP SERPL CALCULATED.3IONS-SCNC: 6 MMOL/L (ref 3–14)
ANISOCYTOSIS BLD QL SMEAR: ABNORMAL
AST SERPL W P-5'-P-CCNC: 48 U/L (ref 0–45)
BASOPHILS # BLD AUTO: 0 10E9/L (ref 0–0.2)
BASOPHILS NFR BLD AUTO: 0.4 %
BILIRUB SERPL-MCNC: 0.2 MG/DL (ref 0.2–1.3)
BUN SERPL-MCNC: 9 MG/DL (ref 7–30)
CALCIUM SERPL-MCNC: 9.2 MG/DL (ref 8.5–10.1)
CHLORIDE SERPL-SCNC: 106 MMOL/L (ref 94–109)
CO2 SERPL-SCNC: 24 MMOL/L (ref 20–32)
CREAT SERPL-MCNC: 0.53 MG/DL (ref 0.52–1.04)
D DIMER PPP FEU-MCNC: 0.6 UG/ML FEU (ref 0–0.5)
DIFFERENTIAL METHOD BLD: ABNORMAL
ELLIPTOCYTES BLD QL SMEAR: SLIGHT
EOSINOPHIL # BLD AUTO: 0.2 10E9/L (ref 0–0.7)
EOSINOPHIL NFR BLD AUTO: 1.7 %
ERYTHROCYTE [DISTWIDTH] IN BLOOD BY AUTOMATED COUNT: 19.9 % (ref 10–15)
FLUAV RNA RESP QL NAA+PROBE: NEGATIVE
FLUBV RNA RESP QL NAA+PROBE: NEGATIVE
GFR SERPL CREATININE-BSD FRML MDRD: >90 ML/MIN/{1.73_M2}
GLUCOSE SERPL-MCNC: 201 MG/DL (ref 70–99)
HCT VFR BLD AUTO: 41.8 % (ref 35–47)
HGB BLD-MCNC: 11.7 G/DL (ref 11.7–15.7)
IMM GRANULOCYTES # BLD: 0.4 10E9/L (ref 0–0.4)
IMM GRANULOCYTES NFR BLD: 3.4 %
LABORATORY COMMENT REPORT: NORMAL
LYMPHOCYTES # BLD AUTO: 3.4 10E9/L (ref 0.8–5.3)
LYMPHOCYTES NFR BLD AUTO: 31.5 %
MCH RBC QN AUTO: 20.5 PG (ref 26.5–33)
MCHC RBC AUTO-ENTMCNC: 28 G/DL (ref 31.5–36.5)
MCV RBC AUTO: 73 FL (ref 78–100)
MONOCYTES # BLD AUTO: 0.6 10E9/L (ref 0–1.3)
MONOCYTES NFR BLD AUTO: 5.2 %
NEUTROPHILS # BLD AUTO: 6.2 10E9/L (ref 1.6–8.3)
NEUTROPHILS NFR BLD AUTO: 57.8 %
NRBC # BLD AUTO: 0 10*3/UL
NRBC BLD AUTO-RTO: 0 /100
PLATELET # BLD AUTO: 263 10E9/L (ref 150–450)
PLATELET # BLD EST: ABNORMAL 10*3/UL
POTASSIUM SERPL-SCNC: 3.6 MMOL/L (ref 3.4–5.3)
PROT SERPL-MCNC: 8.1 G/DL (ref 6.8–8.8)
RBC # BLD AUTO: 5.7 10E12/L (ref 3.8–5.2)
RBC INCLUSIONS BLD: SLIGHT
RSV RNA SPEC QL NAA+PROBE: NORMAL
SARS-COV-2 RNA RESP QL NAA+PROBE: NEGATIVE
SODIUM SERPL-SCNC: 136 MMOL/L (ref 133–144)
SPECIMEN SOURCE: NORMAL
TROPONIN I SERPL-MCNC: <0.015 UG/L (ref 0–0.04)
WBC # BLD AUTO: 10.8 10E9/L (ref 4–11)

## 2021-02-20 PROCEDURE — 99285 EMERGENCY DEPT VISIT HI MDM: CPT | Mod: 25

## 2021-02-20 PROCEDURE — 96361 HYDRATE IV INFUSION ADD-ON: CPT

## 2021-02-20 PROCEDURE — 99214 OFFICE O/P EST MOD 30 MIN: CPT | Performed by: FAMILY MEDICINE

## 2021-02-20 PROCEDURE — 250N000009 HC RX 250: Performed by: EMERGENCY MEDICINE

## 2021-02-20 PROCEDURE — C9803 HOPD COVID-19 SPEC COLLECT: HCPCS

## 2021-02-20 PROCEDURE — 250N000011 HC RX IP 250 OP 636: Performed by: EMERGENCY MEDICINE

## 2021-02-20 PROCEDURE — 84484 ASSAY OF TROPONIN QUANT: CPT | Performed by: EMERGENCY MEDICINE

## 2021-02-20 PROCEDURE — 71275 CT ANGIOGRAPHY CHEST: CPT

## 2021-02-20 PROCEDURE — 96374 THER/PROPH/DIAG INJ IV PUSH: CPT | Mod: 59

## 2021-02-20 PROCEDURE — 93005 ELECTROCARDIOGRAM TRACING: CPT

## 2021-02-20 PROCEDURE — 250N000013 HC RX MED GY IP 250 OP 250 PS 637: Performed by: EMERGENCY MEDICINE

## 2021-02-20 PROCEDURE — 258N000003 HC RX IP 258 OP 636: Performed by: EMERGENCY MEDICINE

## 2021-02-20 PROCEDURE — 80053 COMPREHEN METABOLIC PANEL: CPT | Performed by: EMERGENCY MEDICINE

## 2021-02-20 PROCEDURE — 85025 COMPLETE CBC W/AUTO DIFF WBC: CPT | Performed by: EMERGENCY MEDICINE

## 2021-02-20 PROCEDURE — 87636 SARSCOV2 & INF A&B AMP PRB: CPT | Performed by: EMERGENCY MEDICINE

## 2021-02-20 PROCEDURE — 85379 FIBRIN DEGRADATION QUANT: CPT | Performed by: EMERGENCY MEDICINE

## 2021-02-20 RX ORDER — IOPAMIDOL 755 MG/ML
500 INJECTION, SOLUTION INTRAVASCULAR ONCE
Status: COMPLETED | OUTPATIENT
Start: 2021-02-20 | End: 2021-02-20

## 2021-02-20 RX ORDER — KETOROLAC TROMETHAMINE 15 MG/ML
15 INJECTION, SOLUTION INTRAMUSCULAR; INTRAVENOUS ONCE
Status: COMPLETED | OUTPATIENT
Start: 2021-02-20 | End: 2021-02-20

## 2021-02-20 RX ORDER — ACETAMINOPHEN 325 MG/1
650 TABLET ORAL ONCE
Status: COMPLETED | OUTPATIENT
Start: 2021-02-20 | End: 2021-02-20

## 2021-02-20 RX ORDER — SODIUM CHLORIDE 9 MG/ML
INJECTION, SOLUTION INTRAVENOUS CONTINUOUS
Status: DISCONTINUED | OUTPATIENT
Start: 2021-02-20 | End: 2021-02-21 | Stop reason: HOSPADM

## 2021-02-20 RX ORDER — EPINEPHRINE 0.3 MG/.3ML
0.3 INJECTION SUBCUTANEOUS ONCE
Status: COMPLETED | OUTPATIENT
Start: 2021-02-20 | End: 2021-02-20

## 2021-02-20 RX ADMIN — IOPAMIDOL 52 ML: 755 INJECTION, SOLUTION INTRAVENOUS at 21:26

## 2021-02-20 RX ADMIN — ACETAMINOPHEN 650 MG: 325 TABLET, FILM COATED ORAL at 20:31

## 2021-02-20 RX ADMIN — SODIUM CHLORIDE 1000 ML: 9 INJECTION, SOLUTION INTRAVENOUS at 20:22

## 2021-02-20 RX ADMIN — SODIUM CHLORIDE 72 ML: 9 INJECTION, SOLUTION INTRAVENOUS at 21:26

## 2021-02-20 RX ADMIN — KETOROLAC TROMETHAMINE 15 MG: 15 INJECTION, SOLUTION INTRAMUSCULAR; INTRAVENOUS at 21:17

## 2021-02-20 RX ADMIN — EPINEPHRINE 0.3 MG: 0.3 INJECTION SUBCUTANEOUS at 19:30

## 2021-02-20 ASSESSMENT — ENCOUNTER SYMPTOMS
JOINT SWELLING: 0
CHILLS: 0
SORE THROAT: 0
COUGH: 0
DIZZINESS: 1
FEVER: 0
HEADACHES: 1
SHORTNESS OF BREATH: 1
MYALGIAS: 1

## 2021-02-21 LAB
BACTERIA SPEC CULT: NO GROWTH
BACTERIA SPEC CULT: NO GROWTH
Lab: NORMAL
SPECIMEN SOURCE: NORMAL
SPECIMEN SOURCE: NORMAL

## 2021-02-21 NOTE — PROGRESS NOTES
SUBJECTIVE:   Lucho Lugo is a 35 year old female presenting with a chief complaint of excessive fatigue, dizziness, unsure if due to allergic reaction.    Recent discharge from hospital due to allergic reaction, 2/14-2/16.  Felt similar to this, per patient trigger was herbal remedies that resulted in anaphlaxis.  Patient complain of poor energy, dizziness and neck pain since this afternoon.  Denies trouble breathing.  Poor food intake since discharge.  Complain of neck pain.  Very fatigue.    Has anemia, is unable to take iron supplement.     present, does not want ambulance transport.    Past Medical History:   Diagnosis Date     Diabetes (H)     gestational     H/O gestational diabetes mellitus, not currently pregnant     - diet     Current Outpatient Medications   Medication Sig Dispense Refill     cetirizine (ZYRTEC) 10 MG tablet Take 1 tablet (10 mg) by mouth daily 30 tablet 3     diphenhydrAMINE (BENADRYL) 25 MG tablet Take 25 mg by mouth every 8 hours as needed for itching or allergies       EPINEPHrine (ANY BX GENERIC EQUIV) 0.3 MG/0.3ML injection 2-pack Inject 0.3 mLs (0.3 mg) into the muscle as needed for anaphylaxis 2 each 1     ferrous sulfate (FE TABS) 325 (65 Fe) MG EC tablet Take 1 tablet (325 mg) by mouth 2 times daily 60 tablet 0     montelukast (SINGULAIR) 10 MG tablet TAKE 1 TABLET BY MOUTH AT BEDTIME 90 tablet 1     Social History     Tobacco Use     Smoking status: Never Smoker     Smokeless tobacco: Never Used   Substance Use Topics     Alcohol use: No       ROS:  Review of systems negative except as stated above.    OBJECTIVE:  /80   Pulse 78   Temp 98  F (36.7  C)   Resp 20   SpO2 97%   GENERAL APPEARANCE: tired, mild distress  EYES: EOMI,  PERRL, conjunctiva clear  HENT: Mouth and tongue without swelling  NECK: tenderness with ROM, mild swelling anterior  RESP: lungs clear to auscultation   CV: regular rates and rhythm, normal S1 S2, no murmur noted  SKIN: no  suspicious lesions or rashes    ASSESSMENT/PLAN:  (T78.40XA) Allergic reaction, initial encounter  (primary encounter diagnosis)  Plan: EPINEPHrine (ANY BX GENERIC EQUIV) injection         0.3 mg        To ER    (R53.83) Fatigue, unspecified type  Plan: to ER      Patient here today with possible allergic reaction, was intubated due to anaphylaxis due to herbal remedies.  Unclear if current symptoms due to this as concerns raised by patient and  that symptoms similar to previous episode.  Epi-pen 0.3mg injection given.    Patient continues to complain of dizziness and fatigue, suspect that may be due to dehydration in combination with underlying anemia but given recent history of intubation due to anaphylaxis that low threshold for emergent treatment.    Discussed that presentation will require higher level of care than Urgent Care, stressed critical that if has allergic reaction that protection of airway is critical.   and patient declined paramedic transport.  Vitals stable, no respiratory distress.  Patient will go via private car to [facility] ER, ER notified.     Bunny Pat MD  February 20, 2021 7:45 PM

## 2021-02-21 NOTE — ED TRIAGE NOTES
"Patient was recently discharged from hospital d/t allergic reaction was extubated last week. For the past 3 days she developed neck pain,SOB with flexion of neck,bilateral calf pain and weakness.     Patient states, \" I feel like this is different in comparison to when I had an allergic reaction\"  Denies rash, angioedema     Patient seen at  and given epi pen with some relief .    ABC intact  " Patient was unable to participate in skilled therapy at this time secondary to pt requesting time to sleep but would like OT to return later this date. Will attempt to see patient later in the day as schedule permits.

## 2021-02-22 LAB — INTERPRETATION ECG - MUSE: NORMAL

## 2021-03-12 LAB — LAB SCANNED RESULT: NORMAL

## 2021-04-29 ENCOUNTER — OFFICE VISIT (OUTPATIENT)
Dept: FAMILY MEDICINE | Facility: CLINIC | Age: 36
End: 2021-04-29
Payer: COMMERCIAL

## 2021-04-29 VITALS
BODY MASS INDEX: 19.63 KG/M2 | HEIGHT: 64 IN | SYSTOLIC BLOOD PRESSURE: 118 MMHG | RESPIRATION RATE: 16 BRPM | WEIGHT: 115 LBS | OXYGEN SATURATION: 100 % | TEMPERATURE: 98.2 F | HEART RATE: 67 BPM | DIASTOLIC BLOOD PRESSURE: 72 MMHG

## 2021-04-29 DIAGNOSIS — M77.12 LATERAL EPICONDYLITIS OF LEFT ELBOW: Primary | ICD-10-CM

## 2021-04-29 PROCEDURE — 99213 OFFICE O/P EST LOW 20 MIN: CPT | Performed by: PHYSICIAN ASSISTANT

## 2021-04-29 ASSESSMENT — MIFFLIN-ST. JEOR: SCORE: 1196.64

## 2021-04-29 NOTE — PATIENT INSTRUCTIONS
Ibuprofen 400-600 mg with food up to 3 times daily for pain.    Wear the brace throughout the day    Patient Education     Understanding Lateral Epicondylitis   Tendons are strong bands of tissue that connect muscles to bones. Lateral epicondylitis affects the tendons that connect muscles in the forearm to the lateral epicondyle. This is the bony knob on the outer side of the elbow. The condition occurs if the extensor tendons of the wrist become painful and swollen (irritated). This can cause pain in the elbow, forearm, and wrist. Because the condition is sometimes caused by playing tennis, it's also known as  tennis elbow.     How to say it  LA-tuhr-rahul rd-[id number]-VVU-duh-LY-tis   What causes lateral epicondylitis?  The condition most often occurs because of overuse. This can be from any activity that repeatedly puts stress on the forearm extensor muscles or tendons and wrist. For instance, playing tennis, lifting weights, cutting meat, painting, and typing can all cause the condition. Wear and tear of the tendons from aging or an injury to the tendons can also cause the condition.   Symptoms of lateral epicondylitis  The most common symptom is pain. You may feel it on the outer side of the elbow and down the back of the forearm. It may be worse when moving or using the elbow, forearm, or wrist. You may also feel pain when gripping or lifting things.   Treatment for lateral epicondylitis  Treatments may include:    Resting the elbow, forearm, and wrist. You ll need to avoid movements that can make your symptoms worse. You also may need to avoid certain sports and types of work for a time. This helps relieve symptoms and prevent further damage to the tendons.    Changing the action that caused the problem. For instance, if the tendons were damaged from playing tennis, it may help to change your playing technique or use different equipment. This helps prevent further damage to the tendons.    Using cold packs. Putting an  ice pack on the injured area can help reduce pain and swelling.    Taking pain medicines. Taking prescription or over-the-counter pain medicines may help reduce pain and swelling.      Wearing a brace. This helps reduce strain on the muscles and tendons in the forearm, which may relieve symptoms. It's very important to wear the brace properly.    Doing exercises and physical therapy. These help improve strength and range of motion in the elbow, forearm, and wrist.    Getting shots of medicine into the injured area. These may help relieve symptoms for a time.    Having surgery. This may be an option if other treatments fail to relieve symptoms. In many cases, the surgeon removes the damaged tissue.  Possible complications of lateral epicondylitis  If the tendons involved don t heal properly, symptoms may return or get worse. To help prevent this, follow your treatment plan as directed.   When to call your healthcare provider  Call your healthcare provider right away if you have any of these:    Fever of 100.4 F (38 C) or higher, or as directed by your provider    Chills    Redness, swelling, or warmth in the elbow or forearm that gets worse    Symptoms that don t get better with treatment, or get worse    New symptoms  Davie last reviewed this educational content on 6/1/2019 2000-2021 [employer]. All rights reserved. This information is not intended as a substitute for professional medical care. Always follow your healthcare professional's instructions.

## 2021-04-29 NOTE — LETTER
April 29, 2021      Lucho Lugo  [id number] [id number]        To Whom It May Concern:    Lucho Lugo was seen in our clinic. Her symptoms are due to repetitive work on the job. She may return to work with the following: limited to light duty - lifting no greater than 10 pounds.      Sincerely,        Nelsy Lewis PA-C

## 2021-04-29 NOTE — PROGRESS NOTES
"    Assessment & Plan     Lateral epicondylitis of left elbow  Symptoms are very likely brought on by repetitive work at her job which she just started 2 weeks ago. I recommended submitting a work comp complaint to her work. Limit repetitive work and lifting more than 10 pounds. Wearing elbow brace both at work and at home.  - Wrist/Arm/Hand Supplies Order for DME - ONLY FOR DME    See Patient Instructions    Return in about 2 weeks (around 5/13/2021) for Follow up work comp left elbow.    Nelsy Lewis PA-C  [facility]    Genaro Yepez is a 35 year old who presents for the following health issues    HPI     Musculoskeletal problem/pain  Onset/Duration: 1 week  Description  Location: elbow - left  Joint Swelling: no  Redness: no  Pain: YES  Warmth: no  Intensity:  moderate, severe  Progression of Symptoms:  worsening  Accompanying signs and symptoms:   Fevers: no  Numbness/tingling/weakness: no  History  Trauma to the area: yes - lifting at work  Recent illness:  no  Previous similar problem: no  Previous evaluation:  no  Precipitating or alleviating factors:  Aggravating factors include: lifting and overuse  Therapies tried and outcome: heat and ice, ibuprofen    The patient notes she just recently started a new job in the past couple weeks. She does repetitive work with lifting heavy objects and moving them.    Review of Systems   GENERAL:  No fevers   MUSCULOSKELETAL: As noted in HPI          Objective    /72 (BP Location: Right arm, Patient Position: Chair, Cuff Size: Adult Regular)   Pulse 67   Temp 98.2  F (36.8  C) (Oral)   Resp 16   Ht 1.626 m (5' 4\")   Wt 52.2 kg (115 lb)   SpO2 100%   BMI 19.74 kg/m    Body mass index is 19.74 kg/m .  Physical Exam   GENERAL: No acute distress  HEENT: Normocephalic  VASCULAR: 2+ left radial pulse  EXTREMITIES:  Left upper extremity: Tenderness over the lateral epicondyle. Full range of motion at the elbow (able to flex, " extend, supinate and pronate). Pain with pronation and supination against resistance.  5/5 strength with pronation, 5/5 strength with supination, 5/5  strength.  NEURO: Alert, non-focal

## 2021-04-29 NOTE — LETTER
April 29, 2021      Lucho Lugo  [address] [address] [address]        To Whom It May Concern:    Lucho Lugo was seen in our clinic. Her symptoms are due to repetitive work on the job. She may return to work with the following: limited to light duty - lifting no greater than 10 pounds and allow her to wear the elbow brace.      Sincerely,        Nelsy Lewis PA-C

## 2021-05-17 ENCOUNTER — OFFICE VISIT (OUTPATIENT)
Dept: FAMILY MEDICINE | Facility: CLINIC | Age: 36
End: 2021-05-17
Payer: COMMERCIAL

## 2021-05-17 VITALS
BODY MASS INDEX: 20.32 KG/M2 | SYSTOLIC BLOOD PRESSURE: 115 MMHG | RESPIRATION RATE: 16 BRPM | DIASTOLIC BLOOD PRESSURE: 62 MMHG | WEIGHT: 118.4 LBS | HEART RATE: 73 BPM | OXYGEN SATURATION: 100 %

## 2021-05-17 DIAGNOSIS — M77.12 LATERAL EPICONDYLITIS OF LEFT ELBOW: Primary | ICD-10-CM

## 2021-05-17 PROCEDURE — 99213 OFFICE O/P EST LOW 20 MIN: CPT | Performed by: PHYSICIAN ASSISTANT

## 2021-05-17 NOTE — PROGRESS NOTES
Assessment & Plan     Lateral epicondylitis of left elbow  Patient has yet to file work comp paperwork. Her injury is likely associated with her job as the timing of her pain in the left elbow matches up with the start of her new job. Patient has since left that job due to pain and inability to do the job. I encouraged her to file out the work comp paperwork.  She continue to have pain and is interested in further options. PT and Orthopedics referrals placed for on going treatment and evaluation. I again encouraged her to get work comp involved as this should be paid by the company she had to leave. Otherwise it will fall to her personal insurance.   - [facility]; Future  - Orthopedic & Spine  Referral; Future    Review of external notes as documented elsewhere in note    Nelsy Lewis PA-C  [facility]    Genaro Yepez is a 35 year old who presents for the following health issues   Declines      HPI     Patient is a 35-year-old female who presents today for evaluation and follow-up regarding lateral epicondylitis of the left elbow. The patient was seen approximately 2 weeks ago when this diagnosis was initially made. She had recently started a new job and was performing repetitive work lifting heavy objects and moving them. She feels this is what triggered her pain. It was recommended patient take ibuprofen for pain and use ice as needed. She was provided a tennis elbow brace to use throughout the day especially at work. She was also given restrictions for work.    Patient states that she has lost her strength in her arm. Patient states that she taking pain meds everyday. She is left hand dominant.    Patient notes she had to leave the job ([employer]) due to inability to do the job with the pain and restrictions.  She just started a new job at [employer]. She does not need to lift heavy packages at this new job.      Review of Systems    GENERAL:  No fevers  MUSCULOSKELETAL: As noted in HPI          Objective    /62 (BP Location: Right arm, Patient Position: Sitting, Cuff Size: Adult Regular)   Pulse 73   Resp 16   Wt 53.7 kg (118 lb 6.4 oz)   LMP 05/16/2021 (Exact Date)   SpO2 100%   Breastfeeding No   BMI 20.32 kg/m    Body mass index is 20.32 kg/m .  Physical Exam   GENERAL: No acute distress  HEENT: Normocephalic  VASCULAR: 2+ left radial pulse  EXTREMITIES:  Left upper extremity: Tenderness over the lateral epicondyle. Full range of motion at the elbow (able to flex, extend, supinate and pronate). Pain with pronation and supination against resistance.  5/5 strength with pronation, 5/5 strength with supination.  NEURO: Alert, non-focal

## 2021-07-23 ENCOUNTER — OFFICE VISIT (OUTPATIENT)
Dept: FAMILY MEDICINE | Facility: CLINIC | Age: 36
End: 2021-07-23
Payer: COMMERCIAL

## 2021-07-23 ENCOUNTER — TELEPHONE (OUTPATIENT)
Dept: FAMILY MEDICINE | Facility: CLINIC | Age: 36
End: 2021-07-23

## 2021-07-23 VITALS
HEIGHT: 63 IN | WEIGHT: 116.8 LBS | TEMPERATURE: 98 F | SYSTOLIC BLOOD PRESSURE: 119 MMHG | OXYGEN SATURATION: 100 % | HEART RATE: 63 BPM | DIASTOLIC BLOOD PRESSURE: 87 MMHG | RESPIRATION RATE: 18 BRPM | BODY MASS INDEX: 20.7 KG/M2

## 2021-07-23 DIAGNOSIS — F98.8 ATTENTION DEFICIT DISORDER, UNSPECIFIED HYPERACTIVITY PRESENCE: Primary | ICD-10-CM

## 2021-07-23 PROCEDURE — 99213 OFFICE O/P EST LOW 20 MIN: CPT | Performed by: FAMILY MEDICINE

## 2021-07-23 ASSESSMENT — MIFFLIN-ST. JEOR: SCORE: 1183.93

## 2021-07-23 NOTE — TELEPHONE ENCOUNTER
Left msg to call back to clinic. Pt was in clinic to see  for forms, after the sign off the papers she forgot to take the originals with her. If pt call back, form is at  office.Helen Rainey MA  [facility].

## 2021-07-23 NOTE — PROGRESS NOTES
"        Genaro Yepez is a 36 year old who presents for the following health issues difficultly tracking in lengthy questions, examinations and conversation since she was a young girl  Struggling with English versions makes it too difficult for her to track especially written.     HPI     Concern - attention deficit and dyslexia makes lengthy exam difficult for her. Written english complicates it even more.    Past Medical History:   Diagnosis Date     Diabetes (H)     gestational     H/O gestational diabetes mellitus, not currently pregnant     - diet     Indication for care in labor or delivery 2016   no current diabetes therapy     Past Surgical History:   Procedure Laterality Date      SECTION        SECTION N/A 2016    Procedure:  SECTION;  Surgeon: Keren Cervantes DO;  Location: [facility]      SECTION N/A 2018    Procedure:  SECTION;  Repeat  Section ;  Surgeon: Elizabeth Gudino MD;  Location: [facility]     CHOLECYSTOSTOMY         No family history on file.    Social History     Tobacco Use     Smoking status: Never Smoker     Smokeless tobacco: Never Used   Substance Use Topics     Alcohol use: No         Review of Systems      REVIEW OF SYSTEMS    Generally has been  feeling well until this episode. No problems with vision, hearing, : tracking detailed questions difficult for her,  No dental or neck pain.Has asthma and  airborne or ingestion allergy  No chest pain, palpitations, dyspnea, change in bowel habits, blood  in stool or dyspepsia.  No rashes, changing moles, weakness, lassitude or back problems.  No chronic issues . No dysuria  Patient never  a smoker. No problems with significant headaches.        Objective    /87 (BP Location: Right arm, Patient Position: Chair, Cuff Size: Adult Regular)   Pulse 63   Temp 98  F (36.7  C) (Oral)   Resp 18   Ht 1.6 m (5' 3\")   Wt 53 kg (116 lb 12.8 oz)   LMP 2021   SpO2 " 100%   BMI 20.69 kg/m    Body mass index is 20.69 kg/m .  Physical Exam   GENERAL: healthy, alert and no distress  EYES: Eyes grossly normal to inspection, PERRL and conjunctivae and sclerae normal  HENT: ear canals and TM's normal, nose and mouth without ulcers or lesions  NECK: no adenopathy, no asymmetry, masses, or scars and thyroid normal to palpation  RESP: lungs clear to auscultation - no rales, rhonchi or wheezes  CV: regular rate and rhythm, normal S1 S2, no S3 or S4, no murmur, click or rub, no peripheral edema and peripheral pulses strong  ABDOMEN: soft, nontender, no hepatosplenomegaly, no masses and bowel sounds normal  MS: no gross musculoskeletal defects noted, no edema  SKIN: no suspicious lesions or rashes  NEURO: Normal strength and tone, mentation intact and speech normal  PSYCH: mentation appears normal, affect normal/bright    Current Outpatient Medications   Medication     EPINEPHrine (ANY BX GENERIC EQUIV) 0.3 MG/0.3ML injection 2-pack     ferrous sulfate (FE TABS) 325 (65 Fe) MG EC tablet     montelukast (SINGULAIR) 10 MG tablet     No current facility-administered medications for this visit.     She has allergy and asthma   .  (F98.8) Attention deficit disorder, unspecified hyperactivity presence  Comment: complicated by language but primary  Plan: consider oral or mother tongue options whenever these are available.

## 2021-07-27 NOTE — TELEPHONE ENCOUNTER
Called the pt gave the msg below. Pt will  at 2.00 pm today. Form at  out box.Helen Rainey MA  [facility].

## 2021-09-29 ENCOUNTER — HOSPITAL ENCOUNTER (INPATIENT)
Facility: CLINIC | Age: 36
LOS: 6 days | Discharge: HOME | End: 2021-10-05
Attending: EMERGENCY MEDICINE | Admitting: INTERNAL MEDICINE
Payer: COMMERCIAL

## 2021-09-29 ENCOUNTER — APPOINTMENT (OUTPATIENT)
Dept: CT IMAGING | Facility: CLINIC | Age: 36
End: 2021-09-29
Attending: EMERGENCY MEDICINE
Payer: COMMERCIAL

## 2021-09-29 ENCOUNTER — APPOINTMENT (OUTPATIENT)
Dept: GENERAL RADIOLOGY | Facility: CLINIC | Age: 36
End: 2021-09-29
Attending: EMERGENCY MEDICINE
Payer: COMMERCIAL

## 2021-09-29 DIAGNOSIS — R41.0 DELIRIUM: Primary | ICD-10-CM

## 2021-09-29 DIAGNOSIS — R40.1 OBTUNDED: ICD-10-CM

## 2021-09-29 DIAGNOSIS — J18.9 PNEUMONIA DUE TO INFECTIOUS ORGANISM, UNSPECIFIED LATERALITY, UNSPECIFIED PART OF LUNG: ICD-10-CM

## 2021-09-29 DIAGNOSIS — R06.81 APNEA: ICD-10-CM

## 2021-09-29 DIAGNOSIS — R41.82 ALTERED MENTAL STATUS, UNSPECIFIED ALTERED MENTAL STATUS TYPE: ICD-10-CM

## 2021-09-29 LAB
ALBUMIN SERPL-MCNC: 3.6 G/DL (ref 3.4–5)
ALBUMIN UR-MCNC: NEGATIVE MG/DL
ALP SERPL-CCNC: 71 U/L (ref 40–150)
ALT SERPL W P-5'-P-CCNC: 29 U/L (ref 0–50)
AMMONIA PLAS-SCNC: 43 UMOL/L (ref 10–50)
AMPHETAMINES UR QL SCN: NORMAL
ANION GAP SERPL CALCULATED.3IONS-SCNC: 9 MMOL/L (ref 3–14)
APPEARANCE CSF: CLEAR
APPEARANCE UR: CLEAR
AST SERPL W P-5'-P-CCNC: 21 U/L (ref 0–45)
ATRIAL RATE - MUSE: 100 BPM
B-HCG FREE SERPL-ACNC: <5 IU/L (ref 0–5)
BARBITURATES UR QL: NORMAL
BASE EXCESS BLDV CALC-SCNC: -1.6 MMOL/L (ref -7.7–1.9)
BASE EXCESS BLDV CALC-SCNC: -3.1 MMOL/L (ref -7.7–1.9)
BASOPHILS # BLD AUTO: 0.1 10E3/UL (ref 0–0.2)
BASOPHILS NFR BLD AUTO: 0 %
BENZODIAZ UR QL: NORMAL
BILIRUB SERPL-MCNC: 0.3 MG/DL (ref 0.2–1.3)
BILIRUB UR QL STRIP: NEGATIVE
BUN SERPL-MCNC: 13 MG/DL (ref 7–30)
C GATTII+NEOFOR DNA CSF QL NAA+NON-PROBE: NEGATIVE
CALCIUM SERPL-MCNC: 8.9 MG/DL (ref 8.5–10.1)
CANNABINOIDS UR QL SCN: NORMAL
CHLORIDE BLD-SCNC: 107 MMOL/L (ref 94–109)
CMV DNA CSF QL NAA+NON-PROBE: NEGATIVE
CO2 SERPL-SCNC: 23 MMOL/L (ref 20–32)
COCAINE UR QL: NORMAL
COLOR CSF: COLORLESS
COLOR UR AUTO: ABNORMAL
CREAT SERPL-MCNC: 0.7 MG/DL (ref 0.52–1.04)
DIASTOLIC BLOOD PRESSURE - MUSE: NORMAL MMHG
E COLI K1 AG CSF QL: NEGATIVE
EOSINOPHIL # BLD AUTO: 0.1 10E3/UL (ref 0–0.7)
EOSINOPHIL NFR BLD AUTO: 1 %
ERYTHROCYTE [DISTWIDTH] IN BLOOD BY AUTOMATED COUNT: 18.3 % (ref 10–15)
EV RNA SPEC QL NAA+PROBE: NEGATIVE
GFR SERPL CREATININE-BSD FRML MDRD: >90 ML/MIN/1.73M2
GLUCOSE BLD-MCNC: 138 MG/DL (ref 70–99)
GLUCOSE BLDC GLUCOMTR-MCNC: 119 MG/DL (ref 70–99)
GLUCOSE BLDC GLUCOMTR-MCNC: 142 MG/DL (ref 70–99)
GLUCOSE BLDC GLUCOMTR-MCNC: 168 MG/DL (ref 70–99)
GLUCOSE CSF-MCNC: 66 MG/DL (ref 40–70)
GLUCOSE UR STRIP-MCNC: 100 MG/DL
GP B STREP DNA CSF QL NAA+NON-PROBE: NEGATIVE
GRAM STAIN RESULT: NORMAL
HAEM INFLU DNA CSF QL NAA+NON-PROBE: NEGATIVE
HCO3 BLDV-SCNC: 21 MMOL/L (ref 21–28)
HCO3 BLDV-SCNC: 25 MMOL/L (ref 21–28)
HCO3 BLDV-SCNC: 25 MMOL/L (ref 21–28)
HCT VFR BLD AUTO: 39 % (ref 35–47)
HGB BLD-MCNC: 11.4 G/DL (ref 11.7–15.7)
HGB UR QL STRIP: NEGATIVE
HHV6 DNA CSF QL NAA+NON-PROBE: NEGATIVE
HOLD SPECIMEN: NORMAL
HSV1 DNA CSF QL NAA+NON-PROBE: NEGATIVE
HSV1 DNA CSF QL NAA+PROBE: NOT DETECTED
HSV2 DNA CSF QL NAA+NON-PROBE: NEGATIVE
HSV2 DNA CSF QL NAA+PROBE: NOT DETECTED
IMM GRANULOCYTES # BLD: 0.1 10E3/UL
IMM GRANULOCYTES NFR BLD: 1 %
INTERPRETATION ECG - MUSE: NORMAL
KETONES UR STRIP-MCNC: NEGATIVE MG/DL
L MONOCYTOG DNA CSF QL NAA+NON-PROBE: NEGATIVE
LACTATE BLD-SCNC: 3.4 MMOL/L
LACTATE SERPL-SCNC: 4.3 MMOL/L (ref 0.7–2)
LEUKOCYTE ESTERASE UR QL STRIP: NEGATIVE
LYMPHOCYTES # BLD AUTO: 8.1 10E3/UL (ref 0.8–5.3)
LYMPHOCYTES NFR BLD AUTO: 49 %
MAGNESIUM SERPL-MCNC: 2.3 MG/DL (ref 1.6–2.3)
MCH RBC QN AUTO: 21 PG (ref 26.5–33)
MCHC RBC AUTO-ENTMCNC: 29.2 G/DL (ref 31.5–36.5)
MCV RBC AUTO: 72 FL (ref 78–100)
MONOCYTES # BLD AUTO: 1 10E3/UL (ref 0–1.3)
MONOCYTES NFR BLD AUTO: 6 %
MUCOUS THREADS #/AREA URNS LPF: PRESENT /LPF
N MEN DNA CSF QL NAA+NON-PROBE: NEGATIVE
NEUTROPHILS # BLD AUTO: 7 10E3/UL (ref 1.6–8.3)
NEUTROPHILS NFR BLD AUTO: 43 %
NITRATE UR QL: NEGATIVE
NRBC # BLD AUTO: 0 10E3/UL
NRBC BLD AUTO-RTO: 0 /100
O2/TOTAL GAS SETTING VFR VENT: 40 %
O2/TOTAL GAS SETTING VFR VENT: 40 %
OPIATES UR QL SCN: NORMAL
P AXIS - MUSE: 66 DEGREES
PARECHOVIRUS A RNA CSF QL NAA+NON-PROBE: NEGATIVE
PCO2 BLDV: 32 MM HG (ref 40–50)
PCO2 BLDV: 52 MM HG (ref 40–50)
PCO2 BLDV: 58 MM HG (ref 40–50)
PH BLDV: 7.24 [PH] (ref 7.32–7.43)
PH BLDV: 7.3 [PH] (ref 7.32–7.43)
PH BLDV: 7.42 [PH] (ref 7.32–7.43)
PH UR STRIP: 6.5 [PH] (ref 5–7)
PHOSPHATE SERPL-MCNC: 1.8 MG/DL (ref 2.5–4.5)
PLATELET # BLD AUTO: 390 10E3/UL (ref 150–450)
PO2 BLDV: 105 MM HG (ref 25–47)
PO2 BLDV: 24 MM HG (ref 25–47)
PO2 BLDV: 27 MM HG (ref 25–47)
POTASSIUM BLD-SCNC: 2.8 MMOL/L (ref 3.4–5.3)
POTASSIUM BLD-SCNC: 3.7 MMOL/L (ref 3.4–5.3)
PR INTERVAL - MUSE: 142 MS
PROCALCITONIN SERPL-MCNC: 3.98 NG/ML
PROT CSF-MCNC: 29 MG/DL (ref 15–60)
PROT SERPL-MCNC: 7.7 G/DL (ref 6.8–8.8)
QRS DURATION - MUSE: 88 MS
QT - MUSE: 394 MS
QTC - MUSE: 508 MS
R AXIS - MUSE: 93 DEGREES
RBC # BLD AUTO: 5.42 10E6/UL (ref 3.8–5.2)
RBC # CSF MANUAL: 0 /UL (ref 0–2)
RBC MORPH BLD: NORMAL
RBC URINE: 1 /HPF
S PNEUM DNA CSF QL NAA+NON-PROBE: NEGATIVE
SAO2 % BLDV: 98 % (ref 94–100)
SARS-COV-2 RNA RESP QL NAA+PROBE: NEGATIVE
SODIUM SERPL-SCNC: 139 MMOL/L (ref 133–144)
SP GR UR STRIP: 1.02 (ref 1–1.03)
SYSTOLIC BLOOD PRESSURE - MUSE: NORMAL MMHG
T AXIS - MUSE: 59 DEGREES
TROPONIN I SERPL-MCNC: <0.015 UG/L (ref 0–0.04)
TUBE # CSF: 4
UROBILINOGEN UR STRIP-MCNC: NORMAL MG/DL
VENTRICULAR RATE- MUSE: 100 BPM
VZV DNA CSF QL NAA+NON-PROBE: NEGATIVE
WBC # BLD AUTO: 16.4 10E3/UL (ref 4–11)
WBC # CSF MANUAL: 0 /UL (ref 0–5)
WBC URINE: 0 /HPF

## 2021-09-29 PROCEDURE — 250N000011 HC RX IP 250 OP 636: Performed by: EMERGENCY MEDICINE

## 2021-09-29 PROCEDURE — 250N000011 HC RX IP 250 OP 636: Performed by: INTERNAL MEDICINE

## 2021-09-29 PROCEDURE — 84157 ASSAY OF PROTEIN OTHER: CPT | Performed by: EMERGENCY MEDICINE

## 2021-09-29 PROCEDURE — 74177 CT ABD & PELVIS W/CONTRAST: CPT

## 2021-09-29 PROCEDURE — 96368 THER/DIAG CONCURRENT INF: CPT

## 2021-09-29 PROCEDURE — 999N000157 HC STATISTIC RCP TIME EA 10 MIN

## 2021-09-29 PROCEDURE — 250N000009 HC RX 250: Performed by: EMERGENCY MEDICINE

## 2021-09-29 PROCEDURE — 999N000065 XR CHEST PORT 1 VIEW

## 2021-09-29 PROCEDURE — 89050 BODY FLUID CELL COUNT: CPT | Performed by: EMERGENCY MEDICINE

## 2021-09-29 PROCEDURE — 99223 1ST HOSP IP/OBS HIGH 75: CPT | Mod: AI | Performed by: INTERNAL MEDICINE

## 2021-09-29 PROCEDURE — 80307 DRUG TEST PRSMV CHEM ANLYZR: CPT | Performed by: EMERGENCY MEDICINE

## 2021-09-29 PROCEDURE — 81001 URINALYSIS AUTO W/SCOPE: CPT | Performed by: EMERGENCY MEDICINE

## 2021-09-29 PROCEDURE — 87086 URINE CULTURE/COLONY COUNT: CPT | Performed by: EMERGENCY MEDICINE

## 2021-09-29 PROCEDURE — 31500 INSERT EMERGENCY AIRWAY: CPT

## 2021-09-29 PROCEDURE — 5A1935Z RESPIRATORY VENTILATION, LESS THAN 24 CONSECUTIVE HOURS: ICD-10-PCS | Performed by: EMERGENCY MEDICINE

## 2021-09-29 PROCEDURE — 36415 COLL VENOUS BLD VENIPUNCTURE: CPT | Performed by: EMERGENCY MEDICINE

## 2021-09-29 PROCEDURE — 87205 SMEAR GRAM STAIN: CPT | Performed by: EMERGENCY MEDICINE

## 2021-09-29 PROCEDURE — 93005 ELECTROCARDIOGRAM TRACING: CPT

## 2021-09-29 PROCEDURE — 36415 COLL VENOUS BLD VENIPUNCTURE: CPT | Performed by: INTERNAL MEDICINE

## 2021-09-29 PROCEDURE — 84484 ASSAY OF TROPONIN QUANT: CPT | Performed by: EMERGENCY MEDICINE

## 2021-09-29 PROCEDURE — 83605 ASSAY OF LACTIC ACID: CPT | Performed by: INTERNAL MEDICINE

## 2021-09-29 PROCEDURE — 250N000011 HC RX IP 250 OP 636

## 2021-09-29 PROCEDURE — 009U3ZX DRAINAGE OF SPINAL CANAL, PERCUTANEOUS APPROACH, DIAGNOSTIC: ICD-10-PCS | Performed by: EMERGENCY MEDICINE

## 2021-09-29 PROCEDURE — 82140 ASSAY OF AMMONIA: CPT | Performed by: EMERGENCY MEDICINE

## 2021-09-29 PROCEDURE — 85004 AUTOMATED DIFF WBC COUNT: CPT | Performed by: EMERGENCY MEDICINE

## 2021-09-29 PROCEDURE — 99291 CRITICAL CARE FIRST HOUR: CPT | Mod: 25

## 2021-09-29 PROCEDURE — 84145 PROCALCITONIN (PCT): CPT | Performed by: EMERGENCY MEDICINE

## 2021-09-29 PROCEDURE — 72125 CT NECK SPINE W/O DYE: CPT

## 2021-09-29 PROCEDURE — 96375 TX/PRO/DX INJ NEW DRUG ADDON: CPT

## 2021-09-29 PROCEDURE — 82803 BLOOD GASES ANY COMBINATION: CPT | Performed by: INTERNAL MEDICINE

## 2021-09-29 PROCEDURE — 62270 DX LMBR SPI PNXR: CPT

## 2021-09-29 PROCEDURE — 999N000185 HC STATISTIC TRANSPORT TIME EA 15 MIN

## 2021-09-29 PROCEDURE — 87635 SARS-COV-2 COVID-19 AMP PRB: CPT | Performed by: EMERGENCY MEDICINE

## 2021-09-29 PROCEDURE — 87040 BLOOD CULTURE FOR BACTERIA: CPT | Performed by: EMERGENCY MEDICINE

## 2021-09-29 PROCEDURE — 96367 TX/PROPH/DG ADDL SEQ IV INF: CPT

## 2021-09-29 PROCEDURE — 84100 ASSAY OF PHOSPHORUS: CPT | Performed by: INTERNAL MEDICINE

## 2021-09-29 PROCEDURE — 258N000003 HC RX IP 258 OP 636: Performed by: INTERNAL MEDICINE

## 2021-09-29 PROCEDURE — 82040 ASSAY OF SERUM ALBUMIN: CPT | Performed by: EMERGENCY MEDICINE

## 2021-09-29 PROCEDURE — 84702 CHORIONIC GONADOTROPIN TEST: CPT

## 2021-09-29 PROCEDURE — 70450 CT HEAD/BRAIN W/O DYE: CPT

## 2021-09-29 PROCEDURE — 87529 HSV DNA AMP PROBE: CPT | Performed by: EMERGENCY MEDICINE

## 2021-09-29 PROCEDURE — 87483 CNS DNA AMP PROBE TYPE 12-25: CPT | Performed by: EMERGENCY MEDICINE

## 2021-09-29 PROCEDURE — 94002 VENT MGMT INPAT INIT DAY: CPT

## 2021-09-29 PROCEDURE — 258N000003 HC RX IP 258 OP 636: Performed by: EMERGENCY MEDICINE

## 2021-09-29 PROCEDURE — 82945 GLUCOSE OTHER FLUID: CPT | Performed by: EMERGENCY MEDICINE

## 2021-09-29 PROCEDURE — 82803 BLOOD GASES ANY COMBINATION: CPT

## 2021-09-29 PROCEDURE — 96366 THER/PROPH/DIAG IV INF ADDON: CPT

## 2021-09-29 PROCEDURE — 200N000001 HC R&B ICU

## 2021-09-29 PROCEDURE — 84132 ASSAY OF SERUM POTASSIUM: CPT | Performed by: INTERNAL MEDICINE

## 2021-09-29 PROCEDURE — 96365 THER/PROPH/DIAG IV INF INIT: CPT | Mod: 59

## 2021-09-29 PROCEDURE — 83735 ASSAY OF MAGNESIUM: CPT | Performed by: EMERGENCY MEDICINE

## 2021-09-29 PROCEDURE — 99292 CRITICAL CARE ADDL 30 MIN: CPT

## 2021-09-29 PROCEDURE — 84145 PROCALCITONIN (PCT): CPT | Performed by: INTERNAL MEDICINE

## 2021-09-29 RX ORDER — FENTANYL CITRATE 50 UG/ML
100 INJECTION, SOLUTION INTRAMUSCULAR; INTRAVENOUS ONCE
Status: COMPLETED | OUTPATIENT
Start: 2021-09-29 | End: 2021-09-29

## 2021-09-29 RX ORDER — POTASSIUM CHLORIDE 7.45 MG/ML
10 INJECTION INTRAVENOUS ONCE
Status: COMPLETED | OUTPATIENT
Start: 2021-09-29 | End: 2021-09-29

## 2021-09-29 RX ORDER — PROPOFOL 10 MG/ML
INJECTION, EMULSION INTRAVENOUS
Status: COMPLETED
Start: 2021-09-29 | End: 2021-09-29

## 2021-09-29 RX ORDER — NALOXONE HYDROCHLORIDE 0.4 MG/ML
0.2 INJECTION, SOLUTION INTRAMUSCULAR; INTRAVENOUS; SUBCUTANEOUS
Status: DISCONTINUED | OUTPATIENT
Start: 2021-09-29 | End: 2021-10-05 | Stop reason: HOSPADM

## 2021-09-29 RX ORDER — ONDANSETRON 4 MG/1
4 TABLET, ORALLY DISINTEGRATING ORAL EVERY 6 HOURS PRN
Status: DISCONTINUED | OUTPATIENT
Start: 2021-09-29 | End: 2021-10-05 | Stop reason: HOSPADM

## 2021-09-29 RX ORDER — MAGNESIUM SULFATE HEPTAHYDRATE 40 MG/ML
2 INJECTION, SOLUTION INTRAVENOUS ONCE
Status: COMPLETED | OUTPATIENT
Start: 2021-09-29 | End: 2021-09-29

## 2021-09-29 RX ORDER — ONDANSETRON 2 MG/ML
4 INJECTION INTRAMUSCULAR; INTRAVENOUS EVERY 6 HOURS PRN
Status: DISCONTINUED | OUTPATIENT
Start: 2021-09-29 | End: 2021-10-05 | Stop reason: HOSPADM

## 2021-09-29 RX ORDER — PROPOFOL 10 MG/ML
10-20 INJECTION, EMULSION INTRAVENOUS EVERY 30 MIN PRN
Status: DISCONTINUED | OUTPATIENT
Start: 2021-09-29 | End: 2021-09-30 | Stop reason: CLARIF

## 2021-09-29 RX ORDER — DEXTROSE MONOHYDRATE 25 G/50ML
25-50 INJECTION, SOLUTION INTRAVENOUS
Status: DISCONTINUED | OUTPATIENT
Start: 2021-09-29 | End: 2021-10-05 | Stop reason: HOSPADM

## 2021-09-29 RX ORDER — EPINEPHRINE 1 MG/ML
0.5 INJECTION, SOLUTION, CONCENTRATE INTRAVENOUS ONCE
Status: COMPLETED | OUTPATIENT
Start: 2021-09-29 | End: 2021-09-29

## 2021-09-29 RX ORDER — NICOTINE POLACRILEX 4 MG
15-30 LOZENGE BUCCAL
Status: DISCONTINUED | OUTPATIENT
Start: 2021-09-29 | End: 2021-10-05 | Stop reason: HOSPADM

## 2021-09-29 RX ORDER — PROPOFOL 10 MG/ML
5-75 INJECTION, EMULSION INTRAVENOUS CONTINUOUS
Status: DISCONTINUED | OUTPATIENT
Start: 2021-09-29 | End: 2021-09-29

## 2021-09-29 RX ORDER — LORAZEPAM 2 MG/ML
2-4 INJECTION INTRAMUSCULAR EVERY 4 HOURS PRN
Status: DISCONTINUED | OUTPATIENT
Start: 2021-09-29 | End: 2021-09-30

## 2021-09-29 RX ORDER — DEXTROSE MONOHYDRATE, SODIUM CHLORIDE, AND POTASSIUM CHLORIDE 50; 1.49; 4.5 G/1000ML; G/1000ML; G/1000ML
INJECTION, SOLUTION INTRAVENOUS CONTINUOUS
Status: DISCONTINUED | OUTPATIENT
Start: 2021-09-29 | End: 2021-10-01

## 2021-09-29 RX ORDER — NALOXONE HYDROCHLORIDE 0.4 MG/ML
0.4 INJECTION, SOLUTION INTRAMUSCULAR; INTRAVENOUS; SUBCUTANEOUS
Status: DISCONTINUED | OUTPATIENT
Start: 2021-09-29 | End: 2021-10-05 | Stop reason: HOSPADM

## 2021-09-29 RX ORDER — CEFTRIAXONE 2 G/1
2 INJECTION, POWDER, FOR SOLUTION INTRAMUSCULAR; INTRAVENOUS EVERY 24 HOURS
Status: DISCONTINUED | OUTPATIENT
Start: 2021-09-29 | End: 2021-10-02

## 2021-09-29 RX ORDER — IOPAMIDOL 755 MG/ML
500 INJECTION, SOLUTION INTRAVASCULAR ONCE
Status: COMPLETED | OUTPATIENT
Start: 2021-09-29 | End: 2021-09-29

## 2021-09-29 RX ORDER — METHYLPREDNISOLONE SODIUM SUCCINATE 125 MG/2ML
125 INJECTION, POWDER, LYOPHILIZED, FOR SOLUTION INTRAMUSCULAR; INTRAVENOUS ONCE
Status: COMPLETED | OUTPATIENT
Start: 2021-09-29 | End: 2021-09-29

## 2021-09-29 RX ORDER — MEROPENEM 1 G/1
1 INJECTION, POWDER, FOR SOLUTION INTRAVENOUS ONCE
Status: COMPLETED | OUTPATIENT
Start: 2021-09-29 | End: 2021-09-29

## 2021-09-29 RX ORDER — ETOMIDATE 2 MG/ML
20 INJECTION INTRAVENOUS ONCE
Status: COMPLETED | OUTPATIENT
Start: 2021-09-29 | End: 2021-09-29

## 2021-09-29 RX ORDER — NALOXONE HYDROCHLORIDE 1 MG/ML
1 INJECTION INTRAMUSCULAR; INTRAVENOUS; SUBCUTANEOUS ONCE
Status: COMPLETED | OUTPATIENT
Start: 2021-09-29 | End: 2021-09-29

## 2021-09-29 RX ORDER — ROCURONIUM BROMIDE 50 MG/5 ML
100 SYRINGE (ML) INTRAVENOUS ONCE
Status: COMPLETED | OUTPATIENT
Start: 2021-09-29 | End: 2021-09-29

## 2021-09-29 RX ORDER — HYDROMORPHONE HYDROCHLORIDE 1 MG/ML
0.5 INJECTION, SOLUTION INTRAMUSCULAR; INTRAVENOUS; SUBCUTANEOUS ONCE
Status: COMPLETED | OUTPATIENT
Start: 2021-09-29 | End: 2021-09-29

## 2021-09-29 RX ORDER — PROPOFOL 10 MG/ML
5-75 INJECTION, EMULSION INTRAVENOUS CONTINUOUS
Status: DISCONTINUED | OUTPATIENT
Start: 2021-09-29 | End: 2021-09-30 | Stop reason: CLARIF

## 2021-09-29 RX ADMIN — HYDROMORPHONE HYDROCHLORIDE 0.5 MG: 1 INJECTION, SOLUTION INTRAMUSCULAR; INTRAVENOUS; SUBCUTANEOUS at 18:10

## 2021-09-29 RX ADMIN — SODIUM CHLORIDE 72 ML: 9 INJECTION, SOLUTION INTRAVENOUS at 14:04

## 2021-09-29 RX ADMIN — ETOMIDATE 20 MG: 20 INJECTION, SOLUTION INTRAVENOUS at 13:33

## 2021-09-29 RX ADMIN — SODIUM CHLORIDE, POTASSIUM CHLORIDE, SODIUM LACTATE AND CALCIUM CHLORIDE 500 ML: 600; 310; 30; 20 INJECTION, SOLUTION INTRAVENOUS at 21:32

## 2021-09-29 RX ADMIN — VANCOMYCIN HYDROCHLORIDE 1250 MG: 10 INJECTION, POWDER, LYOPHILIZED, FOR SOLUTION INTRAVENOUS at 14:27

## 2021-09-29 RX ADMIN — POTASSIUM CHLORIDE, DEXTROSE MONOHYDRATE AND SODIUM CHLORIDE: 150; 5; 450 INJECTION, SOLUTION INTRAVENOUS at 20:17

## 2021-09-29 RX ADMIN — PROPOFOL 10 MCG/KG/MIN: 10 INJECTION, EMULSION INTRAVENOUS at 13:39

## 2021-09-29 RX ADMIN — ROCURONIUM BROMIDE 100 MG: 50 INJECTION, SOLUTION INTRAVENOUS at 13:33

## 2021-09-29 RX ADMIN — MEROPENEM 1 G: 1 INJECTION, POWDER, FOR SOLUTION INTRAVENOUS at 13:48

## 2021-09-29 RX ADMIN — PROPOFOL 75 MCG/KG/MIN: 10 INJECTION, EMULSION INTRAVENOUS at 20:29

## 2021-09-29 RX ADMIN — EPINEPHRINE 0.5 MG: 1 INJECTION, SOLUTION INTRAMUSCULAR; SUBCUTANEOUS at 13:30

## 2021-09-29 RX ADMIN — PROPOFOL 75 MCG/KG/MIN: 10 INJECTION, EMULSION INTRAVENOUS at 19:02

## 2021-09-29 RX ADMIN — IOPAMIDOL 70 ML: 755 INJECTION, SOLUTION INTRAVENOUS at 14:04

## 2021-09-29 RX ADMIN — LEVETIRACETAM 2000 MG: 100 INJECTION, SOLUTION INTRAVENOUS at 13:37

## 2021-09-29 RX ADMIN — NALOXONE HYDROCHLORIDE 1 MG: 1 INJECTION PARENTERAL at 13:26

## 2021-09-29 RX ADMIN — Medication 50 MCG/HR: at 21:38

## 2021-09-29 RX ADMIN — CEFTRIAXONE 2 G: 2 INJECTION, POWDER, FOR SOLUTION INTRAMUSCULAR; INTRAVENOUS at 20:25

## 2021-09-29 RX ADMIN — ACYCLOVIR SODIUM 500 MG: 50 INJECTION, SOLUTION INTRAVENOUS at 16:59

## 2021-09-29 RX ADMIN — SODIUM CHLORIDE 1000 ML: 9 INJECTION, SOLUTION INTRAVENOUS at 13:32

## 2021-09-29 RX ADMIN — FENTANYL CITRATE 100 MCG: 50 INJECTION, SOLUTION INTRAMUSCULAR; INTRAVENOUS at 16:27

## 2021-09-29 RX ADMIN — PROPOFOL 75 MCG/KG/MIN: 10 INJECTION, EMULSION INTRAVENOUS at 22:42

## 2021-09-29 RX ADMIN — MAGNESIUM SULFATE HEPTAHYDRATE 2 G: 40 INJECTION, SOLUTION INTRAVENOUS at 14:48

## 2021-09-29 RX ADMIN — POTASSIUM CHLORIDE 10 MEQ: 7.46 INJECTION, SOLUTION INTRAVENOUS at 14:49

## 2021-09-29 RX ADMIN — METHYLPREDNISOLONE SODIUM SUCCINATE 125 MG: 125 INJECTION, POWDER, FOR SOLUTION INTRAMUSCULAR; INTRAVENOUS at 16:45

## 2021-09-29 RX ADMIN — FENTANYL CITRATE 100 MCG: 50 INJECTION, SOLUTION INTRAMUSCULAR; INTRAVENOUS at 13:54

## 2021-09-29 ASSESSMENT — ACTIVITIES OF DAILY LIVING (ADL)
ADLS_ACUITY_SCORE: 12
PATIENT_/_FAMILY_COMMUNICATION_STYLE: SPOKEN LANGUAGE (ENGLISH OR BILINGUAL)

## 2021-09-29 ASSESSMENT — MIFFLIN-ST. JEOR: SCORE: 1187.43

## 2021-09-29 NOTE — H&P
Admitted: 09/29/2021    CHIEF COMPLAINT:  Unresponsiveness.    HISTORY OF PRESENT ILLNESS:  Ms. Carter Lugo is a 36-year-old female with a history of SIGNIFICANT FOOD ALLERGIES AND PREVIOUS ANAPHYLAXIS RESULTING IN ACUTE RESPIRATORY DISTRESS AND NEED FOR INTUBATION.  She presented to the hospital today for concerns about not feeling well, which led to unresponsiveness and need for intubation in the Emergency Department.    History is not totally clear at this point yet.  ER provider did speak to the patient's spouse and obtain historical information.  I spoke to the ER provider and reviewed the electronic medical record.  Spouse was not available at the time I was seeing the patient.    Apparently today, the patient was feeling lethargic and weak.  She called her .  She reportedly drove to her 's place of work.  He then drove the patient to the Emergency Department.  While driving to the ER, the patient apparently became unresponsive.  Reportedly, the patient did receive an influenza vaccine earlier today and was feeling well prior to receiving this.  She has received influenza vaccines in the past without any side effects, per report.    On arrival to the ER, vital signs included blood pressure 156/84 with a heart rate of 115.  No fever.  The patient was intubated on arrival to the ER for airway protection after no response to Narcan.  Saturation 100% on FiO2 of 30 on the mechanical ventilator.    Workup in the ER included labs and imaging.  Lactic acid elevated at 3.4.  Urine drug screen negative.  Urinalysis shows no evidence of infection.  White cell count was 16,000.  Hemoglobin 11.4.  Troponin undetectable.  Potassium is 2.8.  Complete blood count, complete metabolic panel, otherwise unremarkable.  Pregnancy test was negative.  Blood cultures obtained and pending.  Procalcitonin level obtained and pending.  Imaging included a C-spine CT scan showing no acute findings and head CT scan showing  no acute findings.  Chest, abdomen and pelvis CT scan showed no acute findings, including no pulmonary embolism and no acute process in the abdomen or pelvis.  Possible atelectasis versus less likely infiltrate in the lungs.    The patient was intubated in the Emergency Department.  Followup chest x-ray showed endotracheal tube 2 cm above the fidelia.    Lumbar puncture was also performed in the Emergency Department.  Results from the LP are pending.    PAST MEDICAL HISTORY:    1.  History of anaphylaxis leading to acute respiratory failure and need for intubation with mechanical ventilation.  2.  History of COVID positivity 2020.  3.  History of .  4.  History of cholecystectomy.  5.  History of gestational diabetes mellitus.    CURRENT MEDICATIONS:  Unknown.  Await Pharmacy reconciliation of medication list.    ALLERGIES:    1.  PINEAPPLE CAUSES ANAPHYLAXIS.  2.  ORANGE FRUIT/CITRUS.  3.  AMOXICILLIN CAUSES RASH.    FAMILY HISTORY:  Reviewed.  Based on electronic medical record, nothing contributory to this admission.    SOCIAL HISTORY:  Unable to obtain from this patient, as she is intubated and sedated.    REVIEW OF SYSTEMS:  Unable to obtain from this patient, as she is intubated and sedated.    PHYSICAL EXAMINATION:    VITAL SIGNS:  Blood pressure is currently 125/78 with a heart rate of 77.  No fever.  Saturation 100% on mechanical ventilator.  Current ventilator settings include CMV tidal volume of 400 mL, respiratory rate 16 and PEEP of 6.  HEENT:  Head is atraumatic.  Sclerae white.  Eyelids normal.  Conjunctivae normal.  No significant oral swelling.  NECK:  Supple.  No cervical or supraclavicular lymphadenopathy.  CARDIOVASCULAR:  Heart is regular rate and rhythm.  No significant murmurs.  No lower extremity edema.    LUNGS:  Clear to auscultation bilaterally on the ventilator.  No intercostal retractions.  Breathing well with ventilator.   ABDOMEN:  Nontender, nondistended.  Soft.  No masses,  no organomegaly.  EXTREMITIES:  Show no edema.  SKIN:  Reveals no rashes.  No jaundice.  Skin is dry to touch.  NEUROLOGIC:  Cranial nerves II through XII appear to be intact.  Pupils are reactive to light bilaterally.  Pupils approximately 3-4 mm in size.  Limited neurologic exam, as the patient is not following commands given sedation.  PSYCHIATRIC:  Unobtainable in this patient given current sedation.    LABORATORY AND IMAGING DATA:  Reviewed above in HPI.    EKG performed today showed a sinus rhythm with a heart rate of 100.  AR interval, QRS are all normal.  QTc mildly prolonged at 508.  No acute ST changes or pathologic Q-waves.    IMPRESSION AND PLAN:  Ms. Carter Lugo is a 36-year-old female who presented to the hospital today for unresponsiveness.  The patient was feeling unwell earlier today after receiving influenza vaccine earlier this morning.  She was brought to the hospital by her .  By the time she arrived, she was essentially apneic and was intubated in the Emergency Department.  There was no response to Narcan.  She did receive an epinephrine pen injection en route given a family concern for possible anaphylaxis.  She has had respiratory failure due to anaphylaxis previously.    The patient is hemodynamically stable in the Emergency Department.  Workup in the ER notable for elevated lactic acid on presentation, hypokalemia, and leukocytosis.  Imaging unremarkable.  The patient is being admitted to the Intensive Care Unit, intubated on mechanical ventilator.    1.  Unresponsiveness with apneic episode:  Etiology not clear.  Although family was worried about possible anaphylaxis as a cause, no clear signs of oral swelling or other signs of anaphylaxis on exam.  Differential diagnosis includes sepsis in light of her leukocytosis.  Arrhythmia or seizure seem less likely here.  No signs of polysubstance use on urine toxicology screen.  As she received the influenza vaccine earlier today prior  to becoming ill, reaction to this could also be a consideration but would be unusual.  Reportedly, she has received an influenza vaccine in the past without any side effects.  2.  Leukocytosis:  Possible stress reaction due to above.  Sepsis also a possibility.  Workup in the ER included blood cultures, imaging and lumbar puncture, which results of which are pending.  3.  Hypokalemia:  Potassium 2.8.  No EKG changes.  4.  Lactic acidosis.  5.  Urine toxicology screen notable for cannabinoids.    PLAN:    1.  Admit to ICU.  Continue intubation with mechanical ventilation overnight.  2.  Propofol for sedation.  3.  Continue current ventilator settings including CMV with tidal volume 400 mL, respiratory rate 16 and PEEP of 5.  4.  Check VBG on presentation to the ICU.  Adjust ventilator accordingly.  5.  Repeat labs in the morning, including CBC and CMP.  6.  Empiric Rocephin for now. Check procalcitonin level  7.  Monitor cultures.  8.  Monitor on telemetry.  9.  Possible extubation tomorrow pending clinical course.  10.  For now, we will hold off on any further medications for possible anaphylaxis.  Although this was family's initial concern, there does not appear to be any evidence for oral swelling or other signs of anaphylaxis on exam.  She was not hypotensive and remained hemodynamically stable in the Emergency Department.    Marcelo Nathan MD        D: 2021   T: 2021   MT: [facility]    Name:     ZORAIDA PENDLETON  MRN:      [id number]        Account:     [id number]   :      1985           Admitted:    2021       Document: [id number]    cc:  Adelina Aguilera MD

## 2021-09-29 NOTE — H&P
H&P dictated.  Admit for unresponsive episode    Intubated in ER for airway protection    Addendum:  I called and discussed the patient with Dr. Gurrola of the ICU today

## 2021-09-29 NOTE — LETTER
[place] [address] [facility]  [address]  [facility] [address]  Phone: [phone]  Fax: [phone]    October 5, 2021        Lucho Lugo  [address] [facility] [address]          To whom it may concern:    RE: Lucho Lugo    Patient was seen and treated today at our hospital 9/29/21-10/05/21. She can return to work after 2 days, and have 1 week of limitations on heavy lifting.        Please contact me for questions or concerns.      Sincerely,        Raquel Sifuentes

## 2021-09-29 NOTE — LETTER
[place] [address] [facility]  [address]  [facility] [address]  Phone: [phone]  Fax: [phone]    October 5, 2021        Lucho Lugo  [address] [facility] [address]          To whom it may concern:    RE: Lucho Lugo    Patient was seen and treated today at our hospital 9/29/21-10/05/21. She can return to work but with limitations of heavy lifting for a week.    Please contact me for questions or concerns.      Sincerely,        Raquel Sifuentes

## 2021-09-29 NOTE — LETTER
09/29/21      To Whom it may concern:    Angeles Booker was in our Emergency Department today, 09/29/21.   Please excuse him from work for the next few days due to family emergency.  He may return sooner if he is able.    Sincerely,        Zuly CASANOVA RN

## 2021-09-29 NOTE — ED TRIAGE NOTES
Pt arrived in triage with . Pt found to be unresponsive.  states that she had been on phone with him prior. Pt brought immediately back to room 33 and red team activated. Dr. Wilson at bedside. Pt not responsive to painful stimuli.

## 2021-09-29 NOTE — ED NOTES
Patient is moving legs some.  Propofol drip increased.  VSS. Will notify MD for need for increased sedation.

## 2021-09-29 NOTE — PROGRESS NOTES
RT Note:    Patient intubated by MD with 7.5 ETT, secured 24 cm at teeth. Placed on ventilator with settings of:    Ventilation Mode: CMV/AC  (Continuous Mandatory Ventilation/ Assist Control)  FiO2 (%): 30 %  Rate Set (breaths/minute): 16 breaths/min  Tidal Volume Set (mL): 400 mL  PEEP (cm H2O): 5 cmH2O  Oxygen Concentration (%): 30 %  Resp: 22    Transported from ER to CT and back on ventilator without complications. RT will continue to follow.

## 2021-09-29 NOTE — LETTER
[facility]  [address] [place] [address]  [phone]  Dept: [phone]      10/1/2021    Re: Lucho Lugo      TO WHOM IT MAY CONCERN:    Lucho Lugo  was hospitalized at [facility] 9/29/21-10/1/21.      This letter is being written as documentation for both of the employers of Ms. Carter Lugo as well as her  who missed work while she was hospitalized.        Cordially,    Marcelo Nathan MD  [facility]

## 2021-09-29 NOTE — ED PROVIDER NOTES
History   Chief Complaint:  Altered mental staus    The history is limited by the condition of the patient.      Lucho Lugo is a 36 year old female with a history of anaphylaxis who presents with altered mental status and apnea.  Unfortunately, the patient has young children at home and so the  could not stay long.  He states he was at work when he received a phone call that the patient was feeling unwell.  Upon arriving at home I believe he had mentioned she was unresponsive.  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  Unclear if she had possible seizure activity or anaphylaxis today.  She has not received any meds prehospital.  She arrives on a stretcher from triage apneic and unresponsive.      Review of Systems   Unable to perform ROS: Mental status change     Allergies:  Amoxicillin    Medications:    Singular  Epinephrine    Past Medical History:    Diabetes  Anaphylaxis  Gestational thrombocytopenia     Past Surgical History:     section x2  Cholecystostomy     Social History:  Patient was accompanied      Physical Exam     Patient Vitals for the past 24 hrs:   BP Temp Temp src Pulse Resp SpO2   21 1640 125/78 -- -- 77 16 100 %   21 1635 128/79 -- -- 78 15 100 %   21 1630 128/78 -- -- 82 15 100 %   21 1600 122/80 -- -- 90 16 99 %   21 1545 (!) 141/70 -- -- 93 12 100 %   21 1530 (!) 148/84 -- -- 93 19 100 %   21 1525 (!) 141/88 -- -- 91 22 100 %   21 1515 (!) 144/80 -- -- 87 15 100 %   21 1510 (!) 156/84 -- -- 89 15 100 %   21 1350 (!) 156/84 -- -- 115 15 --   21 1345 (!) 144/67 -- -- 113 15 100 %   21 1340 (!) 158/91 -- -- (!) 130 16 100 %   21 1339 -- 97.5  F (36.4  C) Rectal -- -- --   21 1335 (!) 166/78 -- -- (!) 137 19 100 %   21 1325 112/49 -- -- -- 15 100 %     Physical Exam  General: Unresponsive, appears well-developed and well-nourished. Apneic.    HEENT:  Head:  Atraumatic  Ears:  External ears are normal  Mouth/Throat:  Oropharynx is without erythema or exudate and mucous membranes are dry.   Eyes:   Conjunctivae normal and EOM are normal. No scleral icterus.    Pupils are equal, round, and reactive to light.   CV:  Tachycardic rate, regular rhythm, normal heart sounds and radial pulses are 2+ and symmetric.  No murmur.  Resp:  Apneic.  Lung sounds are clear bilaterally although patient is being bagged with BVM.    GI:  Abdomen is soft, no distension.  MS:  Normal range of motion. No edema.    Back atraumatic.  Skin:  Warm and dry.  No rash or lesions noted.  Neuro: Obtunded, unresponsive, apneic.    Psych:  Unable to assess due to unresponsiveness.     Emergency Department Course   ECG:  ECG taken at 1331, ECG read at 1353  Normal sinus rhythm  Right madrigal axis  Nonspecific ST abnormality  Prolonged QT  Abnormal ECG   No significant change as compared to prior, dated 2/20/21.  Rate 100 bpm. MT interval 142 ms. QRS duration 88 ms. QT/QTc 394/508 ms. P-R-T axes 66 93 59.     Imaging:  CT Chest (PE) Abdomen Pelvis w Contrast  1. No pulmonary embolism demonstrated.  2. Minimal probable dependent atelectasis vs. less likely infiltrate  in the lungs.  3. No acute process demonstrated in the abdomen and pelvis.  Reading per radiology    XR Chest Port 1 View  Endotracheal tube tip 1.8 cm from the fidelia, consider  repositioning. Enteric tube tip below the margin of the study in the  left upper quadrant. No definite infiltrates.  Reading per radiology    Laboratory:  Gases lactate venous POCT: pH: 7.42, PCO2: 32 (L), PO2: 105 (H), Bicarbonate: 21, lactic acid 3.4 (H)  HCG quantitative pregnancy POCT: HCG quantitative <5  Glucose by meter: 142 (H)  CBC: WBC 16.4 (H), HGB 11.4 (L),    Troponin (Collected 1335): <0.015  Magnesium: 2.3  CMP: potassium 2.8 (L), glucose 138 (H) o/w WNL (Creatinine 0.7)     UA with microscopic: glucose urine 100 (A), Mucus urine  present (A) o/w WNL   Drug abuse screen urine: cannabinoids screen positive (A) o/w all negative        [facility]    -Intubation    Date/Time: 9/29/2021 2:13 PM  Performed by: Marin Martini  Authorized by: Marin Martini       PRE-PROCEDURE DETAILS     Patient status:  Unresponsive    Pretreatment medications:  None    Paralytics:  Rocuronium      PROCEDURE DETAILS     Preoxygenation:  Bag valve mask    CPR in progress: no      Intubation method:  Oral    Oral intubation technique:  Video-assisted    Laryngoscope blade:  Braxton 3    Tube size (mm):  7.5    Tube type:  Cuffed    Number of attempts:  1    Ventilation between attempts: no      Cricoid pressure: no      Tube visualized through cords: no    PLACEMENT ASSESSMENT     ETT to teeth:  23    Tube secured with:  ETT salter and adhesive tape    Breath sounds:  Equal and absent over the epigastrium    Placement verification: chest rise, condensation, CXR verification, direct visualization, equal breath sounds, ETCO2 detector and tube exhalation      CXR findings:  ETT in proper place  PROCEDURE   Patient Tolerance:  Patient tolerated the procedure well with no immediate complications    [facility]    -Lumbar Puncture    Date/Time: 9/29/2021 4:48 PM  Performed by: Marcelo Wilson MD  Authorized by: Marcelo Wilson MD       PRE-PROCEDURE DETAILS:     Procedure purpose:  Diagnostic    ANESTHESIA (see MAR for exact dosages):     Anesthesia method:  Local infiltration    Local anesthetic:  Lidocaine 1% w/o epi      PROCEDURE DETAILS:     Lumbar space:  L4-L5 interspace    Patient position:  L lateral decubitus    Needle gauge:  20    Needle type:  Spinal needle - Quincke tip    Needle length (in):  3.5    Ultrasound guidance: no      Number of attempts:  1    Fluid appearance:  Clear    Tubes of fluid:  4    Total volume (ml):  6    POST-PROCEDURE:     Puncture site:  Adhesive bandage applied      PROCEDURE   Patient  Tolerance:  Patient tolerated the procedure well with no immediate complications        Emergency Department Course:  1324 Patient arrives, manual bag mask ventilation  1325 Exam performed and placed on monitor  1326 IV placed, history taken from , and narcan given  1328 Glucose 142  1329 EKG taken  1330 0.5mg IM epinephrine  1332 Blood pressure 89/50  1334 Patient intubated, 23cm at teeth  1336 Lactate returned elevated  1341 CXR  1343 Propofol   1458 Re-checked patient  1535 Re-checked patient   1550 Lumbar puncture performed  1600 Spoke with  at bedside with [place] (poppy) intrepreter    Consults:   1518 I spoke with Dr. Nathan of internal medicine regarding patient's presentation, findings, and plan of care.       Interventions:  1326 Narcan, 1 mg, IV  1332 Sodium chloride bolus, 1000 ml, IV   1333 Etomidate, 20 mg, IV  1333 Rocuronium, 100 mg, IV  1339 Propofol, 10 mcg/kg/min, IV  1343 Propofol, 15 mcg/kg/min, IV  1348 Merrem, 1 g, IV  1351 Propofol, 20 mcg/kg/min, IV  1354 Fentanyl, 100 mcg, IV  1427 Vancomycin, 1250 mg, IV  1448 Magnesium sulfate, 2 G, iv  1449 potassium chloride. 10 mEq, IV    Disposition:  The patient was admitted to the hospital under the care of Dr. Nathan.    Impression & Plan      Medical Decision Making:  Patient is a 36-year-old female who presents in an unusual situation with unresponsiveness and apnea.  The initial history was extremely limited as the  was dropping the patient off who appeared to not be breathing on her own and obtunded.  He had mentioned that she may have had an allergic reaction but unclear to the story behind this.  Several hours into the work-up I did have the ability to rediscuss with a [place]  with the  and it appears that she was taking care of their kids this morning and went to the pharmacy across the street from [facility] for her annual flu vaccine.  After receiving her flu vaccine she felt unwell and  apparently had called her  who then met her and ultimately brought her to the emergency department out of concern for potential allergic reaction.  By the time they had arrived at the emergency department in triage she was unresponsive and not breathing on her own.  He did administer an EpiPen into her one of her thighs pre-hospital before getting out of the car, although this was not known until several hours into her emergency department visit.  Patient had no evidence of obvious anaphylaxis on arrival; her blood pressure appeared normal and heart rate was in the low 110's.  She had no fever, no intraoral swelling and no presence of hives or rash.  Due to obtunded status we did obtain a bedside glucose as well as provide a single 1mg of Narcan to see if this would improve her respiratory status.  Glucose appeared normal and Narcan had no response.  Pupils appeared equal and reactive.  Patient was intubated for airway protection due to apnea.  Just before intubation, patient appeared to have a clenching of teeth  response and generalized shaking (question whether this may have represented seizure-like activity).  This activity did happen just after receiving etomidate but before receiving rocuronium in the middle of a rapid sequence intubation.  I did load with 2 g of Keppra in case this at all represents a seizure today.  A broad work-up was performed for altered mental status including CT imaging of the head, cervical spine, chest abdomen pelvis.  Reassuringly CT imaging was grossly unremarkable.  No evidence of septic etiologies, although the patient's initial lactate did return elevated at 3.4.  She received broad-spectrum antibiotics in the setting of altered mental status of unclear etiology.  Her white count also returned elevated at 16.4.  I ultimately performed a lumbar puncture per procedure note.  These studies from the lumbar puncture are still in process at time of admission.  Patient did  receive meropenem, vancomycin, and acyclovir as well as a dose of Solu-Medrol in the emergency department.  She remains sedated on a ventilator and will be admitted to the ICU.  I spoke with Dr. Nathan of the hospitalist service who agreed to admission.    Critical Care time:  was 65 minutes for this patient excluding procedures.    Covid-19  Lucho Lugo was evaluated during a global COVID-19 pandemic, which necessitated consideration that the patient might be at risk for infection with the SARS-CoV-2 virus that causes COVID-19.   Applicable protocols for evaluation were followed during the patient's care.   COVID-19 was considered as part of the patient's evaluation. The plan for testing is:  a test was obtained during this visit.    Diagnosis:    ICD-10-CM    1. Apnea  R06.81    2. Obtunded  R40.1    3. Altered mental status, unspecified altered mental status type  R41.82      Scribe Disclosure:  I, Marin Martini, am serving as a scribe at 1:36 PM on 9/29/2021 to document services personally performed by No att. providers found based on my observations and the provider's statements to me.      Marcelo Wilson MD  09/29/21 1571

## 2021-09-30 ENCOUNTER — APPOINTMENT (OUTPATIENT)
Dept: CARDIOLOGY | Facility: CLINIC | Age: 36
End: 2021-09-30
Attending: INTERNAL MEDICINE
Payer: COMMERCIAL

## 2021-09-30 LAB
ALBUMIN SERPL-MCNC: 3 G/DL (ref 3.4–5)
ALP SERPL-CCNC: 57 U/L (ref 40–150)
ALT SERPL W P-5'-P-CCNC: 26 U/L (ref 0–50)
ANION GAP SERPL CALCULATED.3IONS-SCNC: 7 MMOL/L (ref 3–14)
AST SERPL W P-5'-P-CCNC: 30 U/L (ref 0–45)
BACTERIA UR CULT: NO GROWTH
BASE EXCESS BLDV CALC-SCNC: -2.9 MMOL/L (ref -7.7–1.9)
BILIRUB SERPL-MCNC: 0.3 MG/DL (ref 0.2–1.3)
BUN SERPL-MCNC: 8 MG/DL (ref 7–30)
CALCIUM SERPL-MCNC: 7.3 MG/DL (ref 8.5–10.1)
CHLORIDE BLD-SCNC: 114 MMOL/L (ref 94–109)
CO2 SERPL-SCNC: 20 MMOL/L (ref 20–32)
CREAT SERPL-MCNC: 0.55 MG/DL (ref 0.52–1.04)
ERYTHROCYTE [DISTWIDTH] IN BLOOD BY AUTOMATED COUNT: 17.8 % (ref 10–15)
GFR SERPL CREATININE-BSD FRML MDRD: >90 ML/MIN/1.73M2
GLUCOSE BLD-MCNC: 144 MG/DL (ref 70–99)
GLUCOSE BLDC GLUCOMTR-MCNC: 123 MG/DL (ref 70–99)
GLUCOSE BLDC GLUCOMTR-MCNC: 127 MG/DL (ref 70–99)
GLUCOSE BLDC GLUCOMTR-MCNC: 129 MG/DL (ref 70–99)
GLUCOSE BLDC GLUCOMTR-MCNC: 143 MG/DL (ref 70–99)
GLUCOSE BLDC GLUCOMTR-MCNC: 167 MG/DL (ref 70–99)
GLUCOSE BLDC GLUCOMTR-MCNC: 98 MG/DL (ref 70–99)
HCO3 BLDV-SCNC: 22 MMOL/L (ref 21–28)
HCT VFR BLD AUTO: 31 % (ref 35–47)
HGB BLD-MCNC: 9 G/DL (ref 11.7–15.7)
LACTATE SERPL-SCNC: 2.6 MMOL/L (ref 0.7–2)
LACTATE SERPL-SCNC: 3.1 MMOL/L (ref 0.7–2)
LVEF ECHO: NORMAL
MAGNESIUM SERPL-MCNC: 2.5 MG/DL (ref 1.6–2.3)
MCH RBC QN AUTO: 21 PG (ref 26.5–33)
MCHC RBC AUTO-ENTMCNC: 29 G/DL (ref 31.5–36.5)
MCV RBC AUTO: 72 FL (ref 78–100)
O2/TOTAL GAS SETTING VFR VENT: 30 %
OXYHGB MFR BLDV: 70 % (ref 70–75)
PCO2 BLDV: 35 MM HG (ref 40–50)
PH BLDV: 7.4 [PH] (ref 7.32–7.43)
PHOSPHATE SERPL-MCNC: 2.9 MG/DL (ref 2.5–4.5)
PLATELET # BLD AUTO: 195 10E3/UL (ref 150–450)
PO2 BLDV: 36 MM HG (ref 25–47)
POTASSIUM BLD-SCNC: 4.2 MMOL/L (ref 3.4–5.3)
PROCALCITONIN SERPL-MCNC: 0.02 NG/ML (ref 0–0.49)
PROT SERPL-MCNC: 6.4 G/DL (ref 6.8–8.8)
RBC # BLD AUTO: 4.28 10E6/UL (ref 3.8–5.2)
SODIUM SERPL-SCNC: 141 MMOL/L (ref 133–144)
WBC # BLD AUTO: 11.1 10E3/UL (ref 4–11)

## 2021-09-30 PROCEDURE — 258N000003 HC RX IP 258 OP 636: Performed by: INTERNAL MEDICINE

## 2021-09-30 PROCEDURE — 83735 ASSAY OF MAGNESIUM: CPT | Performed by: INTERNAL MEDICINE

## 2021-09-30 PROCEDURE — 82040 ASSAY OF SERUM ALBUMIN: CPT | Performed by: INTERNAL MEDICINE

## 2021-09-30 PROCEDURE — 250N000011 HC RX IP 250 OP 636: Performed by: INTERNAL MEDICINE

## 2021-09-30 PROCEDURE — 83605 ASSAY OF LACTIC ACID: CPT | Performed by: INTERNAL MEDICINE

## 2021-09-30 PROCEDURE — 99233 SBSQ HOSP IP/OBS HIGH 50: CPT | Performed by: INTERNAL MEDICINE

## 2021-09-30 PROCEDURE — 250N000013 HC RX MED GY IP 250 OP 250 PS 637: Performed by: INTERNAL MEDICINE

## 2021-09-30 PROCEDURE — 36415 COLL VENOUS BLD VENIPUNCTURE: CPT | Performed by: INTERNAL MEDICINE

## 2021-09-30 PROCEDURE — 94003 VENT MGMT INPAT SUBQ DAY: CPT

## 2021-09-30 PROCEDURE — 84100 ASSAY OF PHOSPHORUS: CPT | Performed by: INTERNAL MEDICINE

## 2021-09-30 PROCEDURE — 85027 COMPLETE CBC AUTOMATED: CPT | Performed by: INTERNAL MEDICINE

## 2021-09-30 PROCEDURE — 255N000002 HC RX 255 OP 636: Performed by: INTERNAL MEDICINE

## 2021-09-30 PROCEDURE — 82805 BLOOD GASES W/O2 SATURATION: CPT | Performed by: INTERNAL MEDICINE

## 2021-09-30 PROCEDURE — 99291 CRITICAL CARE FIRST HOUR: CPT | Performed by: INTERNAL MEDICINE

## 2021-09-30 PROCEDURE — C9113 INJ PANTOPRAZOLE SODIUM, VIA: HCPCS | Performed by: INTERNAL MEDICINE

## 2021-09-30 PROCEDURE — 999N000157 HC STATISTIC RCP TIME EA 10 MIN

## 2021-09-30 PROCEDURE — C8929 TTE W OR WO FOL WCON,DOPPLER: HCPCS

## 2021-09-30 PROCEDURE — 999N000208 ECHOCARDIOGRAM COMPLETE

## 2021-09-30 PROCEDURE — 93306 TTE W/DOPPLER COMPLETE: CPT | Mod: 26 | Performed by: INTERNAL MEDICINE

## 2021-09-30 PROCEDURE — 200N000001 HC R&B ICU

## 2021-09-30 RX ORDER — ACETAMINOPHEN 500 MG
500-1000 TABLET ORAL EVERY 6 HOURS PRN
Status: DISCONTINUED | OUTPATIENT
Start: 2021-09-30 | End: 2021-10-02

## 2021-09-30 RX ADMIN — HUMAN ALBUMIN MICROSPHERES AND PERFLUTREN 3 ML: 10; .22 INJECTION, SOLUTION INTRAVENOUS at 08:45

## 2021-09-30 RX ADMIN — PROPOFOL 60 MCG/KG/MIN: 10 INJECTION, EMULSION INTRAVENOUS at 09:17

## 2021-09-30 RX ADMIN — POTASSIUM CHLORIDE, DEXTROSE MONOHYDRATE AND SODIUM CHLORIDE: 150; 5; 450 INJECTION, SOLUTION INTRAVENOUS at 21:42

## 2021-09-30 RX ADMIN — CEFTRIAXONE 2 G: 2 INJECTION, POWDER, FOR SOLUTION INTRAMUSCULAR; INTRAVENOUS at 20:30

## 2021-09-30 RX ADMIN — POTASSIUM & SODIUM PHOSPHATES POWDER PACK 280-160-250 MG 2 PACKET: 280-160-250 PACK at 09:46

## 2021-09-30 RX ADMIN — POTASSIUM & SODIUM PHOSPHATES POWDER PACK 280-160-250 MG 2 PACKET: 280-160-250 PACK at 02:24

## 2021-09-30 RX ADMIN — SODIUM CHLORIDE 500 ML: 9 INJECTION, SOLUTION INTRAVENOUS at 00:57

## 2021-09-30 RX ADMIN — PROPOFOL 50 MCG/KG/MIN: 10 INJECTION, EMULSION INTRAVENOUS at 04:21

## 2021-09-30 RX ADMIN — PANTOPRAZOLE SODIUM 40 MG: 40 INJECTION, POWDER, FOR SOLUTION INTRAVENOUS at 06:32

## 2021-09-30 RX ADMIN — LORAZEPAM 2 MG: 2 INJECTION INTRAMUSCULAR; INTRAVENOUS at 04:42

## 2021-09-30 RX ADMIN — POTASSIUM CHLORIDE, DEXTROSE MONOHYDRATE AND SODIUM CHLORIDE: 150; 5; 450 INJECTION, SOLUTION INTRAVENOUS at 08:11

## 2021-09-30 ASSESSMENT — ACTIVITIES OF DAILY LIVING (ADL)
ADLS_ACUITY_SCORE: 10
ADLS_ACUITY_SCORE: 20
ADLS_ACUITY_SCORE: 20
ADLS_ACUITY_SCORE: 10
ADLS_ACUITY_SCORE: 10
ADLS_ACUITY_SCORE: 22

## 2021-09-30 NOTE — PLAN OF CARE
Right wrist and Left wrist restraints discontinued at 1354 on 9/30/2021.    Restraint discontinue criteria met, patient is calm, cooperative and safe. Restraints removed.     Patient's Response: Verbalized understanding  Family Notification: Other  Attending Physician Notified: MD ordered restraint,      Rosy Gutierrez RN

## 2021-09-30 NOTE — PLAN OF CARE
ICU End of Shift Summary.  For vital signs and complete assessments, please see documentation flowsheets.     Pertinent assessments: Opens eyes to voice. Sedated to RASS goal with propofol. Tele SR. Afebrile. Hypotensive, fluid bolus given. Remains on vent support, LS clear-diminished. OG clamped. No BM. Montero in place with AUOP.    Major Shift Events:   - Hypotensive, decreased sedation and 500cc fluid bolus given  - Phos=1.8; supplementing  - Lactic acid elevated, fluid bolus given    Plan (Upcoming Events): Wean O2 as able; wean sedation as able    Discharge/Transfer Needs: TBD    Bedside Shift Report Completed: Y   Bedside Safety Check Completed: Y

## 2021-09-30 NOTE — PROGRESS NOTES
Tele-ICU:  Notified overnight about hypotension and elevated lactate. Ordered a total of 1L fluid bolus and recheck lactate in the AM.      Clemente Neal M.D.  Pulmonary & Critical Care  Pager: Click Here to page

## 2021-09-30 NOTE — PROGRESS NOTES
"         [facility]  Respiratory Care Note      Pt was placed on a PS trial (5/5) and after tolerating it, she was successfully extubated to a 2 LPM NC. Prior to extubating the cuff was deflated to determine if her swelling had resided. Will continue to monitor and assess the pt's current respiratory status and needs.     Vital signs:  Temp: 98.1  F (36.7  C) Temp src: Axillary BP: 127/72 Pulse: 112   Resp: 17 SpO2: 100 % O2 Device: Nasal cannula Oxygen Delivery: 2 LPM Height: 160 cm (5' 2.99\") Weight: 53.3 kg (117 lb 9.6 oz)  Estimated body mass index is 20.84 kg/m  as calculated from the following:    Height as of this encounter: 1.6 m (5' 2.99\").    Weight as of this encounter: 53.3 kg (117 lb 9.6 oz).      Rasheed Newton RT  [facility]  9/30/2021      .  "

## 2021-09-30 NOTE — PROGRESS NOTES
[facility]  Hospitalist Progress Note  Marcelo Nathan MD 09/30/2021    Reason for Stay (Diagnosis): unresponsiveness         Assessment and Plan:      Summary of Stay: Lucho Lugo is a 36-year-old female who presented to the hospital today for unresponsiveness.  The patient was feeling unwell earlier today after receiving influenza vaccine earlier this morning.  She was brought to the hospital by her .  By the time she arrived, she was essentially apneic and was intubated in the Emergency Department.  There was no response to Narcan.  She did receive an epinephrine pen injection en route given a family concern for possible anaphylaxis.  She has had respiratory failure due to anaphylaxis previously.     The patient is hemodynamically stable in the Emergency Department.  Workup in the ER notable for elevated lactic acid on presentation, hypokalemia, and leukocytosis.  Imaging unremarkable.  The patient was admitted to the Intensive Care Unit, intubated on mechanical ventilator.    Since arrival to the ICU the patient has remained stable.  She had some mild hypotension that responded to IVF bolus.  No pressor requirements.  Remains on empiric antibiotics.      Plans today:  - wean sedation and attempt PS trial.  If does well consider extubation today  - continue IV Rocephin today     1.  Unresponsiveness with apneic episode:  Etiology not clear.  Although family was worried about possible anaphylaxis as a cause, no clear signs of oral swelling or other signs of anaphylaxis on exam.  Differential diagnosis includes sepsis in light of her leukocytosis.  Arrhythmia or seizure seem less likely here.  No signs of polysubstance use on urine toxicology screen.  As she received the influenza vaccine earlier today prior to becoming ill, reaction to this could also be a consideration but would be unusual.  Reportedly, she has received an influenza vaccine in the past without any side  "effects.  2.  Leukocytosis:  Possible stress reaction due to above.  Sepsis also a possibility.  Workup in the ER included blood cultures, imaging and lumbar puncture, all of which are unremarkable.  Remains on empiric Rocephin for now.  3.  Hypokalemia:  Potassium 2.8 on admit.  Supplemented.    4.  Lactic acidosis.  Improved with IVF  5.  Urine toxicology screen positive for cannabinoids.    DVT Prophylaxis: Pneumatic Compression Devices  Code Status: Full Code  Discharge Dispo: home  Estimated Disch Date / # of Days until Disch: 1-2 days after extubation        Interval History (Subjective):      Sedated, on ventilator.  Chart reviewed.  Mildly hypotensive overnight without pressor requirement.  Low grade fever this AM.  Remains intubated with FIO2 of 0.3                  Physical Exam:      Last Vital Signs:  /60   Pulse 106   Temp (!) 100.8  F (38.2  C) (Axillary)   Resp 17   Ht 1.6 m (5' 2.99\")   Wt 53.3 kg (117 lb 9.6 oz)   SpO2 100%   BMI 20.84 kg/m        Intake/Output Summary (Last 24 hours) at 9/30/2021 1247  Last data filed at 9/30/2021 1200  Gross per 24 hour   Intake 1744.84 ml   Output 3390 ml   Net -1645.16 ml       Constitutional: Intubated, sedated, no apparent distress   Respiratory: Clear to auscultation bilaterally, no crackles or wheezing   Cardiovascular: Regular rate and rhythm, normal S1 and S2, and no murmur noted   Abdomen: Normal bowel sounds, soft, non-distended, non-tender   Skin: No rashes, no cyanosis, dry to touch   Neuro: Sedated, limiting neuro exam this AM.  Not following commands due to sedation   Extremities: No edema, normal range of motion   Other(s):        All other systems: Negative          Medications:      All current medications were reviewed with changes reflected in problem list.         Data:      All new lab and imaging data was reviewed.   Labs:  Recent Labs   Lab 09/30/21  1152 09/30/21  0758 09/30/21  0604 09/29/21  2347 09/29/21 2006 09/29/21  1933 " 09/29/21  1335   NA  --   --  141  --   --   --  139   POTASSIUM  --   --  4.2  --  3.7  --  2.8*   CHLORIDE  --   --  114*  --   --   --  107   CO2  --   --  20  --   --   --  23   ANIONGAP  --   --  7  --   --   --  9   GLC 98 127* 144*   < >  --    < > 138*   BUN  --   --  8  --   --   --  13   CR  --   --  0.55  --   --   --  0.70   GFRESTIMATED  --   --  >90  --   --   --  >90   SONDRA  --   --  7.3*  --   --   --  8.9    < > = values in this interval not displayed.     Recent Labs   Lab 09/30/21  0604   WBC 11.1*   HGB 9.0*   HCT 31.0*   MCV 72*         Imaging:   Recent Results (from the past 24 hour(s))   POC US ECHO LIMITED    Narrative    PROCEDURE: Point of care bedside Cardiac US  PERFORMED BY: Dr. Marcelo Wilson  INDICATIONS/SYMPTOM: Unresponsive, apnea.    PROBE: Phased array cardiac transducer.   BODY LOCATION: The US was performed in the sub-xiphoid location and/or parasternal views.  FINDINGS: No evidence of pericardial effusion.  INTERPRETATION: There was no pericardial effusion.    IMAGE DOCUMENTATION: Images were archived to the [employer] hard drive, and archived to PACS system.     XR Chest Port 1 View    Narrative    CHEST ONE VIEW  9/29/2021 2:00 PM     HISTORY: Loss of consciousness.     COMPARISON: February 15, 2021      Impression    IMPRESSION: Endotracheal tube tip 1.8 cm from the fidelia, consider  repositioning. Enteric tube tip below the margin of the study in the  left upper quadrant. No definite infiltrates.    MACHO LARSEN MD         SYSTEM ID:  [id number]   CT Head w/o Contrast    Narrative    CT SCAN OF THE HEAD WITHOUT CONTRAST   9/29/2021 2:23 PM     HISTORY: Altered mental status.    TECHNIQUE:  Axial images of the head and coronal reformations without  IV contrast material. Radiation dose for this scan was reduced using  automated exposure control, adjustment of the mA and/or kV according  to patient size, or iterative reconstruction technique.    COMPARISON: Head CT  8/20/2016.    FINDINGS: There is no evidence of intracranial hemorrhage, mass, acute  infarct or anomaly. The ventricles are normal in size, shape and  configuration. The brain parenchyma and subarachnoid spaces are  normal.     The visualized portions of the sinuses and mastoids appear normal. The  bony calvarium and bones of the skull base appear intact.       Impression    IMPRESSION:   No evidence of acute intracranial hemorrhage, mass, or  herniation.    ASHLEY TIRADO MD         SYSTEM ID:  [id number]   CT Cervical Spine w/o Contrast    Narrative    CT CERVICAL SPINE WITHOUT CONTRAST 9/29/2021 2:24 PM     HISTORY: Neck trauma, altered mental status.     TECHNIQUE: Axial images of the cervical spine were obtained without  intravenous contrast. Multiplanar reformations were performed.   Radiation dose for this scan was reduced using automated exposure  control, adjustment of the mA and/or kV according to patient size, or  iterative reconstruction technique.    COMPARISON: None.    FINDINGS: Straightening of the normal cervical lordosis. No acute  fracture line visualized. No loss of vertebral body height. Lucent 0.6  cm lesion within the C3 vertebral body that appears to have some  internal fat density. This probably represents a venous vascular  malformation (previously turned hemangioma).    Moderate degenerative endplate changes and loss of disc height at  C5-C6 and C6-C7. Mild degenerative endplate changes and loss of disc  height at C4-C5. No significant spinal canal or neural foraminal  narrowing at any level.    ET tube and enteric tube partially visualized.      Impression    IMPRESSION:   1. No evidence of acute fracture or subluxation in the cervical spine.  2. Degenerative changes in the cervical spine without significant  spinal canal or neural foraminal narrowing.     ASHLEY TIRADO MD         SYSTEM ID:  [id number]   CT Chest (PE) Abdomen Pelvis w Contrast    Narrative    CT CHEST PE ABDOMEN PELVIS WITH  CONTRAST 2021 2:24 PM    CLINICAL HISTORY: Chest Pain. Sepsis, apnea of unclear etiology,  altered mental status.    TECHNIQUE: CT scan of the chest, abdomen, and pelvis was performed  following injection of IV contrast. Multiplanar reformats were  obtained. Dose reduction techniques were used.   CONTRAST: 70mL [id number]-370    COMPARISON: None.    FINDINGS:   ANGIOGRAM CHEST: Pulmonary arteries are normal caliber and negative  for pulmonary emboli. Thoracic aorta is negative for dissection. No CT  evidence of right heart strain.    LUNGS AND PLEURA: Minimal dependent probable atelectasis vs. less  likely infiltrate. No effusions.    MEDIASTINUM/AXILLAE: No adenopathy or aneurysm.    HEPATOBILIARY: No significant mass or bile duct dilatation. No  calcified gallstones.     PANCREAS: No significant mass, duct dilatation, or inflammatory  change.    SPLEEN: Normal size.    ADRENAL GLANDS: No significant nodules.    KIDNEYS/BLADDER: No significant mass, stones, or hydronephrosis.    BOWEL: No obstruction or inflammatory change. Enteric tube tip in the  stomach.    PELVIC ORGANS: Small cyst or follicle in the right ovary. Pelvic  structures otherwise unremarkable.    ADDITIONAL FINDINGS: Trace free fluid. No free air.    MUSCULOSKELETAL: Survey of the visualized bony structures demonstrates  no destructive bony lesions.      Impression    IMPRESSION:  1. No pulmonary embolism demonstrated.  2. Minimal probable dependent atelectasis vs. less likely infiltrate  in the lungs.  3. No acute process demonstrated in the abdomen and pelvis.    MACHO LARSEN MD         SYSTEM ID:  [id number]   Echocardiogram Complete   Result Value    LVEF  55-60%    Narrative    [id number]  [id number]  [id number]  [id number]^PRATIBHA^JOSSELYN^[facility]  Echocardiography Laboratory  [address] [place] [address]     Name: ZORAIDA PENDLETON  MRN: [id number]  : 1985  Study Date: 2021 08:15 AM  Age: 36  yrs  Gender: Female  Patient Location: [facility]  Reason For Study: Arrhythmia  Ordering Physician: JOSSELYN MCKINNON  Referring Physician: Adelina Aguilera MD  Performed By: Bernice Becerra RDCS     BSA: 1.5 m2  Height: 62 in  Weight: 117 lb  HR: 87  BP: 114/66 mmHg  ______________________________________________________________________________  Procedure  Complete Portable Echo Adult. Optison (NDC #4020-2526) given intravenously.  ______________________________________________________________________________  Interpretation Summary     Essentially normal adult cardiac echo  The study was technically difficult. Contrast was used without apparent  complications.  ______________________________________________________________________________  Left Ventricle  The left ventricle is normal in size. There is normal left ventricular wall  thickness. The visual ejection fraction is 55-60%. Left ventricular diastolic  function is normal. Diastolic Doppler findings (E/E' ratio and/or other  parameters) suggest left ventricular filling pressures are normal. No regional  wall motion abnormalities noted.     Right Ventricle  The right ventricle is normal in structure, function and size.     Atria  Normal left atrial size. Right atrial size is normal. There is no atrial shunt  seen.     Mitral Valve  The mitral valve leaflets appear normal. There is no evidence of stenosis,  fluttering, or prolapse. There is trace to mild mitral regurgitation.     Tricuspid Valve  Normal tricuspid valve. There is physiologic tricuspid regurgitation. Unable  to assess mean RA pressure given the patient is on a ventilator. Normal size  IVC 1.5 cm.     Aortic Valve  The aortic valve is not well visualized. No aortic regurgitation is present.  No aortic stenosis is present.     Pulmonic Valve  The pulmonic valve is not well seen, but is grossly normal.     Vessels  Normal size aorta.     Pericardium  There is no pericardial effusion.      Rhythm  Sinus rhythm was noted.  ______________________________________________________________________________  MMode/2D Measurements & Calculations  IVSd: 0.63 cm     LVIDd: 4.4 cm  LVIDs: 2.3 cm  LVPWd: 0.74 cm  FS: 47.0 %  LV mass(C)d: 88.4 grams  LV mass(C)dI: 58.1 grams/m2  Ao root diam: 2.7 cm  LA dimension: 3.0 cm  LA/Ao: 1.1  LVOT diam: 1.9 cm  LVOT area: 2.8 cm2  LA Volume (BP): 24.0 ml  LA Volume Index (BP): 15.8 ml/m2  RWT: 0.34     Doppler Measurements & Calculations  MV E max bianca: 94.1 cm/sec  MV A max bianca: 70.6 cm/sec  MV E/A: 1.3  MV dec time: 0.12 sec  Ao V2 max: 130.8 cm/sec  Ao max P.0 mmHg  PA acc time: 0.15 sec  E/E' av.5  Lateral E/e': 5.6  Medial E/e': 7.4     ______________________________________________________________________________  Report approved by: Santa Potts 2021 10:13 AM

## 2021-09-30 NOTE — PROGRESS NOTES
[facility] ICU VENTILATOR RESPIRATORY NOTE  Date of Admission: 09/29/2021  Date of Intubation (most recent): 09/29/2021  Reason for Mechanical Ventilation: Unresponsiveness    Number of Days on Mechanical Ventilation: 1  Met Criteria for Pressure Support Trial: no  Length of Pressure Support Trial: N/A  Reason for Stopping Pressure Support Trial: N/A  Reason for No Pressure Support Trial: New Vent  Significant Events Today: Admitted to ICU  ABG Results: N/A  ETT appearance on chest x-ray: Endotracheal tube tip 1.8 cm from the fidelia    Plan:  Continue to monitor ptAleksander Landaverde, RT

## 2021-09-30 NOTE — PROVIDER NOTIFICATION
DATE:  9/29/2021   TIME OF RECEIPT FROM LAB:  2018  LAB TEST:  Lactic acid  LAB VALUE:  4.3  RESULTS GIVEN WITH READ-BACK TO (PROVIDER):Called to Mayi in Tele hub   TIME LAB VALUE REPORTED TO PROVIDER:   2022    Discussed that patient received 1 L of NS in ED.  Has orders for IVF at 75 ml/hr.

## 2021-09-30 NOTE — PROGRESS NOTES
[facility] Intensive Care Progress Note    Problem List  Acute encephalopathy  Ventilator dependence secondary to above  Possible anaphylaxis?      Date of Service (when I saw the patient): 09/30/2021     Assessment & Plan   Lucho Lugo is a 36 year old female who was admitted on 9/29/2021.  She was feeling unwell and then became unresponsive while being driven to the emergency room by private vehicle.  She has a prior history of intubation due to suspected anaphylactic reaction.    Neurology:  Currently sedated: When propofol turned off she was responding and gagging on tube  RASS goal 0/-1  Sedatives: Propofol infusion:   -Wean sedation today    Cardiovascular:  Adequate perfusion: No vasoactive medications  -    Pulmonary:        Ventilator dependence secondary to airway protection:   CXR: clear   -    Ventilation Mode: CPAP/PS  (Continuous positive airway pressure with Pressure Support)  FiO2 (%): 30 %  Rate Set (breaths/minute): 18 breaths/min  Tidal Volume Set (mL): 400 mL  PEEP (cm H2O): 5 cmH2O  Pressure Support (cm H2O): 5 cmH2O  Oxygen Concentration (%): 30 %  Resp: 17      Renal  Normal function and electrolytes: Glycosuria noted but otherwise normal UA  -    ID:         Suspected infection: Leukocytosis and fever upon presentation, she was feeling unwell prior to coming to the hospital.  Lumbar puncture performed showing no organisms 1+ WBCs on Gram stain no nucleated cells on the cell count.  Negative herpes and meningoencephalitis PCR tests  -She received empiric acyclovir, ceftriaxone, meropenem, vancomycin.    DVT Prophylaxis: Low Risk/Ambulatory with no VTE prophylaxis indicated  GI Prophylaxis: PPI    Restraints: Restraints for medical healing needed: NO    Family update by me today: Yes     Pamella Gurrola MD    Time Spent on this Encounter   Billing:  I spent 30 minutes, exclusive of procedures and teaching, bedside and on the inpatient unit  today managing the critical care of Lucho Lugo in relation to the issues listed in this note.    Main Plans for Today   extubation    Interval History   Admitted yesterday with feeling unwell and unresponsive. Similar prior episode attributed to anaphylaxis?    Physical Exam   Temp: (!) 100.8  F (38.2  C) Temp src: Axillary Temp  Min: 97.2  F (36.2  C)  Max: 100.8  F (38.2  C) BP: 112/60 Pulse: 106   Resp: 17 SpO2: 100 % O2 Device: Mechanical Ventilator    Vitals:    09/29/21 2000   Weight: 53.3 kg (117 lb 9.6 oz)     I/O last 3 completed shifts:  In: 680 [I.V.:490; NG/GT:90; IV Piggyback:100]  Out: 2690 [Urine:2690]    Current Vitals:Temp:  [97.2  F (36.2  C)-100.8  F (38.2  C)] 100.8  F (38.2  C)  Pulse:  [] 106  Resp:  [12-22] 17  BP: ()/(45-91) 112/60  FiO2 (%):  [30 %-40 %] 30 %  SpO2:  [98 %-100 %] 100 %   Awake, alert and following commands  PERRL and conjugate gaze  Orally intubated  Lungs clear  H-RR w/o murmur  Abdomen-soft, non tender, non distended  Extremities-warm and no edema  Lines: No erythema or discharge at entry site for No invasive lines  Current lines are required for patient management    Medications     dextrose 5% and 0.45% NaCl + KCl 20 mEq/L 75 mL/hr at 09/30/21 0811     fentaNYL Stopped (09/30/21 0215)     propofol (DIPRIVAN) infusion 10 mcg/kg/min (09/30/21 1100)    And     - MEDICATION INSTRUCTIONS -         cefTRIAXone  2 g Intravenous Q24H     pantoprazole  40 mg Per Feeding Tube QAM AC    Or     pantoprazole (PROTONIX) IV  40 mg Intravenous QAM AC     potassium & sodium phosphates  2 packet Oral or Feeding Tube TID       Data   Recent Labs   Lab 09/30/21  1152 09/30/21  0758 09/30/21  0604 09/29/21  2347 09/29/21 2006 09/29/21  1933 09/29/21  1335   WBC  --   --  11.1*  --   --   --  16.4*   HGB  --   --  9.0*  --   --   --  11.4*   MCV  --   --  72*  --   --   --  72*   PLT  --   --  195  --   --   --  390   NA  --   --  141  --   --   --  139   POTASSIUM   --   --  4.2  --  3.7  --  2.8*   CHLORIDE  --   --  114*  --   --   --  107   CO2  --   --  20  --   --   --  23   BUN  --   --  8  --   --   --  13   CR  --   --  0.55  --   --   --  0.70   ANIONGAP  --   --  7  --   --   --  9   SONDRA  --   --  7.3*  --   --   --  8.9   GLC 98 127* 144*   < >  --    < > 138*   ALBUMIN  --   --  3.0*  --   --   --  3.6   PROTTOTAL  --   --  6.4*  --   --   --  7.7   BILITOTAL  --   --  0.3  --   --   --  0.3   ALKPHOS  --   --  57  --   --   --  71   ALT  --   --  26  --   --   --  29   AST  --   --  30  --   --   --  21   TROPONIN  --   --   --   --   --   --  <0.015    < > = values in this interval not displayed.     Recent Results (from the past 24 hour(s))   POC US ECHO LIMITED    Narrative    PROCEDURE: Point of care bedside Cardiac US  PERFORMED BY: Dr. Marcelo Wilson  INDICATIONS/SYMPTOM: Unresponsive, apnea.    PROBE: Phased array cardiac transducer.   BODY LOCATION: The US was performed in the sub-xiphoid location and/or parasternal views.  FINDINGS: No evidence of pericardial effusion.  INTERPRETATION: There was no pericardial effusion.    IMAGE DOCUMENTATION: Images were archived to the US hard drive, and archived to PACS system.     XR Chest Port 1 View    Narrative    CHEST ONE VIEW  9/29/2021 2:00 PM     HISTORY: Loss of consciousness.     COMPARISON: February 15, 2021      Impression    IMPRESSION: Endotracheal tube tip 1.8 cm from the fidelia, consider  repositioning. Enteric tube tip below the margin of the study in the  left upper quadrant. No definite infiltrates.    MACHO LARSEN MD         SYSTEM ID:  [id number]   CT Head w/o Contrast    Narrative    CT SCAN OF THE HEAD WITHOUT CONTRAST   9/29/2021 2:23 PM     HISTORY: Altered mental status.    TECHNIQUE:  Axial images of the head and coronal reformations without  IV contrast material. Radiation dose for this scan was reduced using  automated exposure control, adjustment of the mA and/or kV according  to patient size,  or iterative reconstruction technique.    COMPARISON: Head CT 8/20/2016.    FINDINGS: There is no evidence of intracranial hemorrhage, mass, acute  infarct or anomaly. The ventricles are normal in size, shape and  configuration. The brain parenchyma and subarachnoid spaces are  normal.     The visualized portions of the sinuses and mastoids appear normal. The  bony calvarium and bones of the skull base appear intact.       Impression    IMPRESSION:   No evidence of acute intracranial hemorrhage, mass, or  herniation.    ASHLEY TIRADO MD         SYSTEM ID:  [id number]   CT Cervical Spine w/o Contrast    Narrative    CT CERVICAL SPINE WITHOUT CONTRAST 9/29/2021 2:24 PM     HISTORY: Neck trauma, altered mental status.     TECHNIQUE: Axial images of the cervical spine were obtained without  intravenous contrast. Multiplanar reformations were performed.   Radiation dose for this scan was reduced using automated exposure  control, adjustment of the mA and/or kV according to patient size, or  iterative reconstruction technique.    COMPARISON: None.    FINDINGS: Straightening of the normal cervical lordosis. No acute  fracture line visualized. No loss of vertebral body height. Lucent 0.6  cm lesion within the C3 vertebral body that appears to have some  internal fat density. This probably represents a venous vascular  malformation (previously turned hemangioma).    Moderate degenerative endplate changes and loss of disc height at  C5-C6 and C6-C7. Mild degenerative endplate changes and loss of disc  height at C4-C5. No significant spinal canal or neural foraminal  narrowing at any level.    ET tube and enteric tube partially visualized.      Impression    IMPRESSION:   1. No evidence of acute fracture or subluxation in the cervical spine.  2. Degenerative changes in the cervical spine without significant  spinal canal or neural foraminal narrowing.     ASHLEY TIRADO MD         SYSTEM ID:  [id number]   CT Chest (PE) Abdomen Pelvis w  Contrast    Narrative    CT CHEST PE ABDOMEN PELVIS WITH CONTRAST 9/29/2021 2:24 PM    CLINICAL HISTORY: Chest Pain. Sepsis, apnea of unclear etiology,  altered mental status.    TECHNIQUE: CT scan of the chest, abdomen, and pelvis was performed  following injection of IV contrast. Multiplanar reformats were  obtained. Dose reduction techniques were used.   CONTRAST: 70mL [id number]-370    COMPARISON: None.    FINDINGS:   ANGIOGRAM CHEST: Pulmonary arteries are normal caliber and negative  for pulmonary emboli. Thoracic aorta is negative for dissection. No CT  evidence of right heart strain.    LUNGS AND PLEURA: Minimal dependent probable atelectasis vs. less  likely infiltrate. No effusions.    MEDIASTINUM/AXILLAE: No adenopathy or aneurysm.    HEPATOBILIARY: No significant mass or bile duct dilatation. No  calcified gallstones.     PANCREAS: No significant mass, duct dilatation, or inflammatory  change.    SPLEEN: Normal size.    ADRENAL GLANDS: No significant nodules.    KIDNEYS/BLADDER: No significant mass, stones, or hydronephrosis.    BOWEL: No obstruction or inflammatory change. Enteric tube tip in the  stomach.    PELVIC ORGANS: Small cyst or follicle in the right ovary. Pelvic  structures otherwise unremarkable.    ADDITIONAL FINDINGS: Trace free fluid. No free air.    MUSCULOSKELETAL: Survey of the visualized bony structures demonstrates  no destructive bony lesions.      Impression    IMPRESSION:  1. No pulmonary embolism demonstrated.  2. Minimal probable dependent atelectasis vs. less likely infiltrate  in the lungs.  3. No acute process demonstrated in the abdomen and pelvis.    MACHO LARSEN MD         SYSTEM ID:  [id number]   Echocardiogram Complete   Result Value    LVEF  55-60%    Narrative    [id number]  [id number]  [id number]  [id number]^PRATIBHA^JOSSELYN^[facility]  Echocardiography Laboratory  [address] [place] [address]     Name: ZORAIDA PENDLETON  MRN:  [id number]  : 1985  Study Date: 2021 08:15 AM  Age: 36 yrs  Gender: Female  Patient Location: [facility]  Reason For Study: Arrhythmia  Ordering Physician: JOSSELYN MCKINNON  Referring Physician: Adelina Aguilera MD  Performed By: Bernice Becerra, [facility]     BSA: 1.5 m2  Height: 62 in  Weight: 117 lb  HR: 87  BP: 114/66 mmHg  ______________________________________________________________________________  Procedure  Complete Portable Echo Adult. Optison (NDC #9310-0467) given intravenously.  ______________________________________________________________________________  Interpretation Summary     Essentially normal adult cardiac echo  The study was technically difficult. Contrast was used without apparent  complications.  ______________________________________________________________________________  Left Ventricle  The left ventricle is normal in size. There is normal left ventricular wall  thickness. The visual ejection fraction is 55-60%. Left ventricular diastolic  function is normal. Diastolic Doppler findings (E/E' ratio and/or other  parameters) suggest left ventricular filling pressures are normal. No regional  wall motion abnormalities noted.     Right Ventricle  The right ventricle is normal in structure, function and size.     Atria  Normal left atrial size. Right atrial size is normal. There is no atrial shunt  seen.     Mitral Valve  The mitral valve leaflets appear normal. There is no evidence of stenosis,  fluttering, or prolapse. There is trace to mild mitral regurgitation.     Tricuspid Valve  Normal tricuspid valve. There is physiologic tricuspid regurgitation. Unable  to assess mean RA pressure given the patient is on a ventilator. Normal size  IVC 1.5 cm.     Aortic Valve  The aortic valve is not well visualized. No aortic regurgitation is present.  No aortic stenosis is present.     Pulmonic Valve  The pulmonic valve is not well seen, but is grossly normal.     Vessels  Normal size  aorta.     Pericardium  There is no pericardial effusion.     Rhythm  Sinus rhythm was noted.  ______________________________________________________________________________  MMode/2D Measurements & Calculations  IVSd: 0.63 cm     LVIDd: 4.4 cm  LVIDs: 2.3 cm  LVPWd: 0.74 cm  FS: 47.0 %  LV mass(C)d: 88.4 grams  LV mass(C)dI: 58.1 grams/m2  Ao root diam: 2.7 cm  LA dimension: 3.0 cm  LA/Ao: 1.1  LVOT diam: 1.9 cm  LVOT area: 2.8 cm2  LA Volume (BP): 24.0 ml  LA Volume Index (BP): 15.8 ml/m2  RWT: 0.34     Doppler Measurements & Calculations  MV E max bianca: 94.1 cm/sec  MV A max bianca: 70.6 cm/sec  MV E/A: 1.3  MV dec time: 0.12 sec  Ao V2 max: 130.8 cm/sec  Ao max P.0 mmHg  PA acc time: 0.15 sec  E/E' av.5  Lateral E/e': 5.6  Medial E/e': 7.4     ______________________________________________________________________________  Report approved by: Santa Potts 2021 10:13 AM

## 2021-09-30 NOTE — PROGRESS NOTES
Critical access hospital ICU VENTILATOR RESPIRATORY NOTE  Date of Admission: 09/29/2021  Date of Intubation (most recent): 09/29/2021  Reason for Mechanical Ventilation: Unresponsiveness     Number of Days on Mechanical Ventilation: 2  Met Criteria for Pressure Support Trial: no  Length of Pressure Support Trial: N/A  Reason for Stopping Pressure Support Trial: N/A  Reason for No Pressure Support Trial: New Vent  Significant Events Today: Admitted to ICU  ABG Results: N/A  ETT appearance on chest x-ray: Endotracheal tube tip 1.8 cm from the fidelia     Plan:  Continue to monitor ptAleksander Landaverde, RT

## 2021-09-30 NOTE — PROGRESS NOTES
Pt hypotensive with BP 80s/40s & MAPs high 50s.     Telehub notified; 500 fluid bolus ordered and administered. No improvement in BP. Per telehub, stop fentanyl gtt for now and increase propofol if needed.    Will monitor.

## 2021-10-01 ENCOUNTER — APPOINTMENT (OUTPATIENT)
Dept: GENERAL RADIOLOGY | Facility: CLINIC | Age: 36
End: 2021-10-01
Attending: INTERNAL MEDICINE
Payer: COMMERCIAL

## 2021-10-01 LAB
GLUCOSE BLDC GLUCOMTR-MCNC: 103 MG/DL (ref 70–99)
GLUCOSE BLDC GLUCOMTR-MCNC: 104 MG/DL (ref 70–99)
GLUCOSE BLDC GLUCOMTR-MCNC: 120 MG/DL (ref 70–99)
GLUCOSE BLDC GLUCOMTR-MCNC: 86 MG/DL (ref 70–99)
LACTATE SERPL-SCNC: 1 MMOL/L (ref 0.7–2)
MAGNESIUM SERPL-MCNC: 2.5 MG/DL (ref 1.6–2.3)
PHOSPHATE SERPL-MCNC: 2 MG/DL (ref 2.5–4.5)
PHOSPHATE SERPL-MCNC: 2.5 MG/DL (ref 2.5–4.5)
POTASSIUM BLD-SCNC: 3.7 MMOL/L (ref 3.4–5.3)

## 2021-10-01 PROCEDURE — 36415 COLL VENOUS BLD VENIPUNCTURE: CPT | Performed by: INTERNAL MEDICINE

## 2021-10-01 PROCEDURE — 84100 ASSAY OF PHOSPHORUS: CPT | Performed by: INTERNAL MEDICINE

## 2021-10-01 PROCEDURE — 84132 ASSAY OF SERUM POTASSIUM: CPT | Performed by: INTERNAL MEDICINE

## 2021-10-01 PROCEDURE — 258N000003 HC RX IP 258 OP 636: Performed by: INTERNAL MEDICINE

## 2021-10-01 PROCEDURE — 250N000009 HC RX 250: Performed by: INTERNAL MEDICINE

## 2021-10-01 PROCEDURE — 250N000013 HC RX MED GY IP 250 OP 250 PS 637: Performed by: INTERNAL MEDICINE

## 2021-10-01 PROCEDURE — 250N000011 HC RX IP 250 OP 636: Performed by: INTERNAL MEDICINE

## 2021-10-01 PROCEDURE — 99232 SBSQ HOSP IP/OBS MODERATE 35: CPT | Performed by: INTERNAL MEDICINE

## 2021-10-01 PROCEDURE — 120N000001 HC R&B MED SURG/OB

## 2021-10-01 PROCEDURE — C9113 INJ PANTOPRAZOLE SODIUM, VIA: HCPCS | Performed by: INTERNAL MEDICINE

## 2021-10-01 PROCEDURE — 77003 FLUOROGUIDE FOR SPINE INJECT: CPT

## 2021-10-01 PROCEDURE — 83735 ASSAY OF MAGNESIUM: CPT | Performed by: INTERNAL MEDICINE

## 2021-10-01 PROCEDURE — 83605 ASSAY OF LACTIC ACID: CPT | Performed by: INTERNAL MEDICINE

## 2021-10-01 PROCEDURE — 3E0R3GC INTRODUCTION OF OTHER THERAPEUTIC SUBSTANCE INTO SPINAL CANAL, PERCUTANEOUS APPROACH: ICD-10-PCS | Performed by: PHYSICIAN ASSISTANT

## 2021-10-01 RX ORDER — CEFUROXIME AXETIL 500 MG/1
500 TABLET ORAL 2 TIMES DAILY
Qty: 8 TABLET | Refills: 0 | Status: SHIPPED | OUTPATIENT
Start: 2021-10-01 | End: 2021-10-05

## 2021-10-01 RX ADMIN — ACETAMINOPHEN 1000 MG: 500 TABLET, FILM COATED ORAL at 08:02

## 2021-10-01 RX ADMIN — ACETAMINOPHEN 1000 MG: 500 TABLET, FILM COATED ORAL at 00:02

## 2021-10-01 RX ADMIN — CEFTRIAXONE 2 G: 2 INJECTION, POWDER, FOR SOLUTION INTRAMUSCULAR; INTRAVENOUS at 20:19

## 2021-10-01 RX ADMIN — PANTOPRAZOLE SODIUM 40 MG: 40 INJECTION, POWDER, FOR SOLUTION INTRAVENOUS at 08:04

## 2021-10-01 RX ADMIN — POTASSIUM CHLORIDE, DEXTROSE MONOHYDRATE AND SODIUM CHLORIDE: 150; 5; 450 INJECTION, SOLUTION INTRAVENOUS at 10:07

## 2021-10-01 RX ADMIN — SODIUM PHOSPHATE, MONOBASIC, MONOHYDRATE AND SODIUM PHOSPHATE, DIBASIC, ANHYDROUS 9 MMOL: 276; 142 INJECTION, SOLUTION INTRAVENOUS at 17:42

## 2021-10-01 RX ADMIN — ACETAMINOPHEN 500 MG: 500 TABLET, FILM COATED ORAL at 16:14

## 2021-10-01 ASSESSMENT — ACTIVITIES OF DAILY LIVING (ADL)
ADLS_ACUITY_SCORE: 12
ADLS_ACUITY_SCORE: 10
ADLS_ACUITY_SCORE: 12
ADLS_ACUITY_SCORE: 7
ADLS_ACUITY_SCORE: 12
ADLS_ACUITY_SCORE: 10

## 2021-10-01 ASSESSMENT — MIFFLIN-ST. JEOR: SCORE: 1196.96

## 2021-10-01 NOTE — PROCEDURES
[facility]    Procedure: Lumbar epidural blood patch    Date/Time: 10/1/2021 3:22 PM  Performed by: Josseline Akers PA-C  Authorized by: Josseline Akers PA-C     UNIVERSAL PROTOCOL   Site Marked: Yes  Prior Images Obtained and Reviewed:  Yes  Required items: Required blood products, implants, devices and special equipment available    Patient identity confirmed:  Verbally with patient  NA - No sedation, light sedation, or local anesthesia  Confirmation Checklist:  Patient's identity using two indicators, relevant allergies, procedure was appropriate and matched the consent or emergent situation and correct equipment/implants were available  Time out: Immediately prior to the procedure a time out was called    Universal Protocol: the Joint Commission Universal Protocol was followed    Preparation: Patient was prepped and draped in usual sterile fashion           ANESTHESIA    Anesthesia: Local infiltration  Local Anesthetic:  Lidocaine 1% without epinephrine  Anesthetic Total (mL):  3      SEDATION    Patient Sedated: No    See dictated procedure note for full details.  PROCEDURE   Patient Tolerance:  Patient tolerated the procedure well with no immediate complications  Describe Procedure: 20mL of autologous blood injected at L4-5 to match skin puncture site and ER provider dictation for level of puncture  Length of time physician/provider present for 1:1 monitoring during sedation: 0

## 2021-10-01 NOTE — PROGRESS NOTES
Blood patch completed per Josseline SPARKS without difficulty, patient tolerated well. Patient transferred to room via cart ins table condition. See also imaging dictation for procedural information and post procedure orders.

## 2021-10-01 NOTE — PLAN OF CARE
ICU End of Shift Summary.  For vital signs and complete assessments, please see documentation flowsheets.     Pertinent assessments: Pt very lethargic at the beginning of the shift only waking up when voice was raised or with touch. Unable to keep her eyes open for long and unable to have a conversation. Tele-Hub updated by previous nurse. Lethargy improved throughout shift with patient able to wake up on her own by 0400 and hold a conversation. Neuros remain intact all night. Remains on room air at 100% with no shortness of breath. Tele NSR.   Major Shift Events:  - 2030 - Spoke with Tele-Hub RN to follow up on previous nurse's update and inform her pt describes double vision which as been happening since extubation per her report. Also requesting Tylenol as pt c/o 6/10 headache that gets worse when she sits up. RN called back with orders from Dr. Neal for Tylenol. She also stated he was not worried about the headache or the double vision at this time.                     - Montero catheter removed at 0430 as patient c/o             uncomfortableness with it, and she is more alert.                      - Pt c/o increased pain whenever she would sit up in bed. Given Tylenol x 2 with patient able to sleep after. MD aware of problem with plan to just monitor for now - neuros remain intact.   Plan (Upcoming Events): Continue to monitor neuro status closely. Control pain. Increase activity as tolerated.  Discharge/Transfer Needs: Pt would like to discharge as soon as possible today.    Bedside Shift Report Completed :   Bedside Safety Check Completed:

## 2021-10-01 NOTE — PLAN OF CARE
ICU End of Shift Summary.  For vital signs and complete assessments, please see documentation flowsheets.     Pertinent assessments: Pt A&Ox4, tearful d/t pain at times; NSR. Lung sounds clear & equal bilaterally. No BM this shift, bowel sounds present. Voiding spontaneously w/adequate urine output. Up SBA to commode. Advanced to regular diet; fair appetite. No notable skin findings. Patient transferred off unit to floor at 1730, expected discharge tomorrow.     Major Shift Events:   -reported severe neck pain/headache this AM - found to have csf leak following lumbar puncture   -received blood patch at approx 1430. Pt reported relief of head/neck pain following procedure but did complain of a mild headache.     Plan (Upcoming Events): Discharge?   Discharge/Transfer Needs: Transferred to floor this shift, discharge in AM pending pt condition.     Bedside Shift Report Completed: Yes.   Bedside Safety Check Completed: Yes.

## 2021-10-01 NOTE — PROGRESS NOTES
Pt seen and examined today.  She is complaining of severe posterior neck and headache this AM.  She is sitting up in bed.  SHe is tearful.  HA started yesterday evening.  She has no focal neurologic signs and no hx of trauma.  No TTP on exam and neck appears normal.   After patient was placed in a flat position her symptoms resolved.     I suspect a post-LP HA.  I discussed with radiology and they are arranging a blood patch today.    If patient feels well after blood patch potential discharge later today

## 2021-10-01 NOTE — PROGRESS NOTES
[facility]  Hospitalist Progress Note  Marcelo Nathan MD 10/01/2021    Reason for Stay (Diagnosis): unresponsive episode         Assessment and Plan:      Summary of Stay: Lucho Lugo is a 36-year-old female who presented to the hospital today for unresponsiveness.  The patient was feeling unwell earlier today after receiving influenza vaccine earlier this morning.  She was brought to the hospital by her .  By the time she arrived, she was essentially apneic and was intubated in the Emergency Department.  There was no response to Narcan.  She did receive an epinephrine pen injection en route given a family concern for possible anaphylaxis.  She has had respiratory failure due to anaphylaxis previously.     The patient is hemodynamically stable in the Emergency Department.  Workup in the ER notable for elevated lactic acid on presentation, hypokalemia, and leukocytosis.  Imaging unremarkable.  The patient was admitted to the Intensive Care Unit, intubated on mechanical ventilator.     Since arrival to the ICU the patient has remained stable.  She had some mild hypotension that responded to IVF bolus.  No pressor requirements.  Remains on empiric antibiotics.      The patient was extubated on 9/30.    Today she was complaining of severe posterior neck and headache this AM.  She was sitting up in bed.  SHe was tearful.  HA started yesterday evening.  She has no focal neurologic signs and no hx of trauma.  No TTP on exam and neck appears normal.   After patient was placed in a flat position her symptoms resolved.      I suspect a post-LP HA.  I discussed with radiology and a blood patch was performed today.  After the procedure the patient's severe neck and posterior HA resolved, but she now has a milder bitemporal HA.     Plans today:  - had considered discharge after blood patch today, but given pt still having HA will monitor overnight.  Anticipate discharge tomorrow.    - can  "transfer to medical floor     1.  Unresponsiveness with reported apneic episode:  Etiology not clear.  Although family was worried about possible anaphylaxis as a cause, no clear signs of oral swelling or other signs of anaphylaxis on exam.  Differential diagnosis included sepsis in light of her leukocytosis.  Arrhythmia or seizure seem less likely here.  No signs of polysubstance use on urine toxicology screen.  As she received the influenza vaccine the morning prior to becoming acutely ill, vaccine reaction could also be a consideration.  Reportedly, she has received an influenza vaccine in the past without any side effects.  2.  Leukocytosis:  Possible stress reaction due to above.  Sepsis also a possibility.  Workup in the ER included blood cultures, imaging and lumbar puncture, all of which are unremarkable.  Remains on empiric Rocephin for now.  Blood cx remain neg.  3.  Hypokalemia:  Potassium 2.8 on admit.  Supplemented.    4.  Lactic acidosis.  Improved with IVF  5.  Urine toxicology screen positive for cannabinoids.     DVT Prophylaxis: Pneumatic Compression Devices  Code Status: Full Code  Discharge Dispo: home  Estimated Disch Date / # of Days until Disch: likely tomorrow        Interval History (Subjective):      Today she was complaining of severe posterior neck and headache this AM.  She was sitting up in bed.  SHe was tearful.  HA started yesterday evening.  She has no focal neurologic signs and no hx of trauma.  No TTP on exam and neck appears normal.   After patient was placed in a flat position her symptoms resolved.                     Physical Exam:      Last Vital Signs:  /73   Pulse 77   Temp (P) 98.9  F (37.2  C) (Axillary)   Resp 24   Ht 1.6 m (5' 2.99\")   Wt 54.3 kg (119 lb 11.2 oz)   SpO2 100%   BMI 21.21 kg/m        Intake/Output Summary (Last 24 hours) at 10/1/2021 1647  Last data filed at 10/1/2021 1430  Gross per 24 hour   Intake 2567.5 ml   Output 1800 ml   Net 767.5 ml "       Constitutional: Awake, alert, tearful and c/o posterior neck pain when I saw her earlier this AM   Respiratory: Clear to auscultation bilaterally, no crackles or wheezing   Cardiovascular: Regular rate and rhythm, normal S1 and S2, and no murmur noted   Abdomen: Normal bowel sounds, soft, non-distended, non-tender   Skin: No rashes, no cyanosis, dry to touch   Neuro: Alert and oriented x3, no weakness, numbness, memory loss   Extremities: No edema, normal range of motion   Other(s): No TTP to posterior neck       All other systems: Negative          Medications:      All current medications were reviewed with changes reflected in problem list.         Data:      All new lab and imaging data was reviewed.   Labs:  Recent Labs   Lab 10/01/21  1605 10/01/21  1227 10/01/21  0746 10/01/21  0523 10/01/21  0417 09/30/21  0758 09/30/21  0604 09/29/21  2347 09/29/21 2006 09/29/21  1933 09/29/21  1335   NA  --   --   --   --   --   --  141  --   --   --  139   POTASSIUM  --   --   --  3.7  --   --  4.2  --  3.7  --  2.8*   CHLORIDE  --   --   --   --   --   --  114*  --   --   --  107   CO2  --   --   --   --   --   --  20  --   --   --  23   ANIONGAP  --   --   --   --   --   --  7  --   --   --  9   GLC 86 104* 120*  --    < >   < > 144*   < >  --    < > 138*   BUN  --   --   --   --   --   --  8  --   --   --  13   CR  --   --   --   --   --   --  0.55  --   --   --  0.70   GFRESTIMATED  --   --   --   --   --   --  >90  --   --   --  >90   SONDRA  --   --   --   --   --   --  7.3*  --   --   --  8.9    < > = values in this interval not displayed.     Recent Labs   Lab 09/30/21  0604   WBC 11.1*   HGB 9.0*   HCT 31.0*   MCV 72*         Imaging:   Recent Results (from the past 24 hour(s))   XR Blood Patch Procedure    Narrative    XR BLOOD PATCH PROCEDURE                 10/1/2021 3:23 PM       History:  Post lumbar puncture headache.     PROCEDURE:  The procedure, indications, risks (including the risk  of  infection, bleeding and reaction to contrast material and  medications), and alternatives to therapy were discussed with the  patient and informed consent was obtained for the procedure with the  assistance of her sister in law on the phone for interpreting.  The  lower back was prepped and draped in the usual fashion.  Lidocaine 1%  was used for local anesthesia.  Using fluoroscopic guidance, a  3.5  inch 22 gauge spinal needle was advanced into the epidural space via  interlaminar approach at the L4-5 level. This matches the previous  skin puncture site and level of LP per procedure dictation from the  ER.  A small amount of contrast was injected confirming epidural  location of the needle.  Subsequently, 20 mL of the patient's blood  was injected.  The needle was removed.  The patient tolerated the  procedure well and there were no immediate complications.        Fluoroscopy time: 0.3 minutes  Number of Images: 4    Meds given: 3 mL 1% lidocaine, a 3 mL Isovue-M 200      Impression    IMPRESSION:  Technically successful epidural blood patch injection at   L4-5    ABDIRIZAK SCOTT PA-C         SYSTEM ID:  [id number]

## 2021-10-01 NOTE — PLAN OF CARE
"ICU End of Shift Summary.  For vital signs and complete assessments, please see documentation flowsheets.     Pertinent assessments: Patient was extubated today at 1352 to 2 lpm/Nasal cannula. Pt lethargic throughout the day, however arouses to voice and has had intermittent conversations with staff. Pupils PERRLA, oriented, moves all extremities equally, however weakness noted. Pt has continued lethargy, sedation stopped this am (see MAR). Around 1800, assisted patient to sit up in bed, while sitting up, patient complained of extreme headache and right neck pain, also noted some dizziness when sitting. Patient returned to supine position and headache resolved. Pt did have a LP (negative) yesterday in ED (around 1630). No Drainage noted on back. ( ? CSF leak?) Did notify tele Hub (Stefanie) about this finding. 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 Patient states that she \"never sleeps and is so tired\". This could also be playing a small role.     Major Shift Events: Extubated; lethargic, generalized weakness;  increased headache when sitting up.   Plan (Upcoming Events): Continue to monitor neuros  Discharge/Transfer Needs: TBD; pt was very concerned about getting back to work. Pt was tearful this afternoon and stated she was \"worried about hospital bills\" if she stays longer.    Bedside Shift Report Completed :   Bedside Safety Check Completed:      "

## 2021-10-02 ENCOUNTER — APPOINTMENT (OUTPATIENT)
Dept: MRI IMAGING | Facility: CLINIC | Age: 36
End: 2021-10-02
Attending: HOSPITALIST
Payer: COMMERCIAL

## 2021-10-02 LAB
ANION GAP SERPL CALCULATED.3IONS-SCNC: 8 MMOL/L (ref 3–14)
BASOPHILS # BLD AUTO: 0 10E3/UL (ref 0–0.2)
BASOPHILS NFR BLD AUTO: 1 %
BUN SERPL-MCNC: 9 MG/DL (ref 7–30)
CALCIUM SERPL-MCNC: 8.7 MG/DL (ref 8.5–10.1)
CHLORIDE BLD-SCNC: 111 MMOL/L (ref 94–109)
CO2 SERPL-SCNC: 21 MMOL/L (ref 20–32)
CREAT SERPL-MCNC: 0.48 MG/DL (ref 0.52–1.04)
EOSINOPHIL # BLD AUTO: 0.1 10E3/UL (ref 0–0.7)
EOSINOPHIL NFR BLD AUTO: 1 %
ERYTHROCYTE [DISTWIDTH] IN BLOOD BY AUTOMATED COUNT: 17.7 % (ref 10–15)
GFR SERPL CREATININE-BSD FRML MDRD: >90 ML/MIN/1.73M2
GLUCOSE BLD-MCNC: 85 MG/DL (ref 70–99)
HCT VFR BLD AUTO: 36.2 % (ref 35–47)
HGB BLD-MCNC: 10.8 G/DL (ref 11.7–15.7)
IMM GRANULOCYTES # BLD: 0 10E3/UL
IMM GRANULOCYTES NFR BLD: 1 %
LYMPHOCYTES # BLD AUTO: 1.8 10E3/UL (ref 0.8–5.3)
LYMPHOCYTES NFR BLD AUTO: 23 %
MAGNESIUM SERPL-MCNC: 2.3 MG/DL (ref 1.6–2.3)
MCH RBC QN AUTO: 21.9 PG (ref 26.5–33)
MCHC RBC AUTO-ENTMCNC: 29.8 G/DL (ref 31.5–36.5)
MCV RBC AUTO: 73 FL (ref 78–100)
MONOCYTES # BLD AUTO: 0.4 10E3/UL (ref 0–1.3)
MONOCYTES NFR BLD AUTO: 5 %
NEUTROPHILS # BLD AUTO: 5.8 10E3/UL (ref 1.6–8.3)
NEUTROPHILS NFR BLD AUTO: 69 %
NRBC # BLD AUTO: 0 10E3/UL
NRBC BLD AUTO-RTO: 0 /100
PHOSPHATE SERPL-MCNC: 2.6 MG/DL (ref 2.5–4.5)
PLATELET # BLD AUTO: 175 10E3/UL (ref 150–450)
POTASSIUM BLD-SCNC: 4 MMOL/L (ref 3.4–5.3)
RBC # BLD AUTO: 4.94 10E6/UL (ref 3.8–5.2)
SODIUM SERPL-SCNC: 140 MMOL/L (ref 133–144)
WBC # BLD AUTO: 8.2 10E3/UL (ref 4–11)

## 2021-10-02 PROCEDURE — 84100 ASSAY OF PHOSPHORUS: CPT | Performed by: INTERNAL MEDICINE

## 2021-10-02 PROCEDURE — 80048 BASIC METABOLIC PNL TOTAL CA: CPT | Performed by: INTERNAL MEDICINE

## 2021-10-02 PROCEDURE — 85004 AUTOMATED DIFF WBC COUNT: CPT | Performed by: INTERNAL MEDICINE

## 2021-10-02 PROCEDURE — 36415 COLL VENOUS BLD VENIPUNCTURE: CPT | Performed by: INTERNAL MEDICINE

## 2021-10-02 PROCEDURE — 250N000013 HC RX MED GY IP 250 OP 250 PS 637: Performed by: HOSPITALIST

## 2021-10-02 PROCEDURE — 70546 MR ANGIOGRAPH HEAD W/O&W/DYE: CPT

## 2021-10-02 PROCEDURE — 250N000013 HC RX MED GY IP 250 OP 250 PS 637: Performed by: INTERNAL MEDICINE

## 2021-10-02 PROCEDURE — 83735 ASSAY OF MAGNESIUM: CPT | Performed by: INTERNAL MEDICINE

## 2021-10-02 PROCEDURE — 99232 SBSQ HOSP IP/OBS MODERATE 35: CPT | Performed by: HOSPITALIST

## 2021-10-02 PROCEDURE — A9585 GADOBUTROL INJECTION: HCPCS | Performed by: INTERNAL MEDICINE

## 2021-10-02 PROCEDURE — 255N000002 HC RX 255 OP 636: Performed by: INTERNAL MEDICINE

## 2021-10-02 PROCEDURE — 120N000001 HC R&B MED SURG/OB

## 2021-10-02 RX ORDER — NAPROXEN 250 MG/1
250 TABLET ORAL 2 TIMES DAILY WITH MEALS
Status: COMPLETED | OUTPATIENT
Start: 2021-10-02 | End: 2021-10-02

## 2021-10-02 RX ORDER — GADOBUTROL 604.72 MG/ML
7.5 INJECTION INTRAVENOUS ONCE
Status: COMPLETED | OUTPATIENT
Start: 2021-10-02 | End: 2021-10-02

## 2021-10-02 RX ORDER — DOXYCYCLINE 100 MG/1
100 CAPSULE ORAL EVERY 12 HOURS SCHEDULED
Status: DISCONTINUED | OUTPATIENT
Start: 2021-10-02 | End: 2021-10-05 | Stop reason: HOSPADM

## 2021-10-02 RX ORDER — ACETAMINOPHEN 500 MG
500-1000 TABLET ORAL EVERY 8 HOURS PRN
Status: DISCONTINUED | OUTPATIENT
Start: 2021-10-02 | End: 2021-10-02

## 2021-10-02 RX ADMIN — DOXYCYCLINE HYCLATE 100 MG: 100 CAPSULE ORAL at 13:33

## 2021-10-02 RX ADMIN — NAPROXEN 250 MG: 250 TABLET ORAL at 18:52

## 2021-10-02 RX ADMIN — NAPROXEN 250 MG: 250 TABLET ORAL at 10:22

## 2021-10-02 RX ADMIN — ACETAMINOPHEN 1000 MG: 500 TABLET, FILM COATED ORAL at 13:33

## 2021-10-02 RX ADMIN — DOXYCYCLINE HYCLATE 100 MG: 100 CAPSULE ORAL at 20:32

## 2021-10-02 RX ADMIN — ACETAMINOPHEN 1000 MG: 500 TABLET, FILM COATED ORAL at 05:44

## 2021-10-02 RX ADMIN — GADOBUTROL 5.5 ML: 604.72 INJECTION INTRAVENOUS at 09:02

## 2021-10-02 ASSESSMENT — ACTIVITIES OF DAILY LIVING (ADL)
ADLS_ACUITY_SCORE: 7

## 2021-10-02 ASSESSMENT — MIFFLIN-ST. JEOR: SCORE: 1190.15

## 2021-10-02 NOTE — PROGRESS NOTES
[facility]    Hospitalist Progress Note             Date of Admission:  9/29/2021                   Day of hospitalization: 3    Assessment and Plan:      Lucho Lugo is a 36-year-old female who presented to the hospital for unresponsiveness.  The patient was feeling unwell earlier today after receiving influenza vaccine earlier this morning.  She was brought to the hospital by her .  By the time she arrived, she was essentially apneic and was intubated in the Emergency Department.  There was no response to Narcan.  She did receive an epinephrine pen injection en route given a family concern for possible anaphylaxis.  She has had respiratory failure due to anaphylaxis previously.     The patient is hemodynamically stable in the Emergency Department.  Workup in the ER notable for elevated lactic acid on presentation, hypokalemia, and leukocytosis.  Imaging unremarkable.  The patient was admitted to the Intensive Care Unit, intubated on mechanical ventilator.     Since arrival to the ICU the patient has remained stable.  She had some mild hypotension that responded to IVF bolus.  No pressor requirements.  was treated with empiric antibiotics       The patient was extubated on 9/30.     10/1 she was complaining of severe posterior neck and headache this AM.  She was sitting up in bed.  SHe was tearful.  HA started yesterday evening.  She has no focal neurologic signs and no hx of trauma.  No TTP on exam and neck appears normal.   After patient was placed in a flat position her symptoms resolved.      Suspect  a post-LP HA.  She had blood patch placed 10/1    Today headache persists. MRV ordered negative.          #  Unresponsiveness with reported apneic episode:  Etiology not clear.  Although family was worried about possible anaphylaxis as a cause, no clear signs of oral swelling or other signs of anaphylaxis on exam.  Differential diagnosis included sepsis in light of her  "leukocytosis.  Arrhythmia or seizure seem less likely here.  No signs of polysubstance use on urine toxicology screen.  As she received the influenza vaccine the morning prior to becoming acutely ill, vaccine reaction could also be a consideration.  Reportedly, she has received an influenza vaccine in the past without any side effects.  echocardiogram normal  - MRV normal  - change IV ceftriaxone to doxycyline     #headache post LP  - received blood patch, ordered Nsaids and tylenol, neurology consult for any further input  - MRV negative dural sinus thrombosis    #  Leukocytosis:  Possible stress reaction due to above.  Sepsis also a possibility.  Workup in the ER included blood cultures, imaging and lumbar puncture, all of which are unremarkable.  changed ceftriaxone to doxycycline  Blood cx remain neg.  #  Hypokalemia:  Potassium 2.8 on admit.  Supplemented.    #  Lactic acidosis.  Improved with IVF  #  Urine toxicology screen positive for cannabinoids.     # Code status: Full   # Anticipated discharge date and Disposition:1-2 days pending clnilcal improvement  # DVT: SCDs  # IVF: none                       Raquel Sifuentes MD  [employer] Page (7am - 6pm, M-F)               Subjective   Chief Complaint:  Headache  Subective: Complains of severe subfrontal headache improves with lying down.  Still has sore throat after extubation has mild nonproductive cough no shortness of breath chest pain fevers or chills or abdominal pain.       Objective   /70 (BP Location: Right arm)   Pulse 75   Temp 98.3  F (36.8  C) (Oral)   Resp 16   Ht 1.6 m (5' 2.99\")   Wt 53.6 kg (118 lb 3.2 oz)   SpO2 100%   BMI 20.94 kg/m       Physical Exam  General: Pt in NAD, normal appearance  HEENT: OP clear MMM, no JVD  Lungs: Clear to Auscultation Bilateral, normal breathing  without accessory muscle usage, no wheezing, rhonchi or crackles  Cardiac: +S1, S2, RRR, no MRG, no edema  Abdominal: normal bowel sounds, NT/ND  Skin: warm, dry, " normal turgor, no rash  Psyche: A& O x3, appropriate affect             Intake/Output Summary (Last 24 hours) at 10/2/2021 1304  Last data filed at 10/1/2021 2200  Gross per 24 hour   Intake 607.5 ml   Output --   Net 607.5 ml           Labs and Imaging Results:      Recent Labs   Lab 10/02/21  0739 09/30/21  0604   WBC 8.2 11.1*   HGB 10.8* 9.0*    195        Recent Labs   Lab 10/02/21  0739 09/30/21  0604    141   CO2 21 20   BUN 9 8      No results for input(s): INR, PTT in the last 168 hours.   No results for input(s): CKMB in the last 168 hours.    Invalid input(s): TROPONINT     Recent Labs   Lab 09/30/21  0604 09/29/21  1335   ALBUMIN 3.0* 3.6   AST 30 21   ALT 26 29   ALKPHOS 57 71        Micro:     Radio:  MRV Brain wo & w Contrast   Final Result   IMPRESSION:      HEAD MRV:    1.  No dural venous sinus thrombosis or significant stenosis.         XR Blood Patch Procedure   Final Result   IMPRESSION:  Technically successful epidural blood patch injection at    L4-5      ABDIRIZAK SCOTT PA-C            SYSTEM ID:  [id number]      Echocardiogram Complete   Final Result      CT Chest (PE) Abdomen Pelvis w Contrast   Final Result   IMPRESSION:   1. No pulmonary embolism demonstrated.   2. Minimal probable dependent atelectasis vs. less likely infiltrate   in the lungs.   3. No acute process demonstrated in the abdomen and pelvis.      MACHO LARSEN MD            SYSTEM ID:  [id number]      CT Cervical Spine w/o Contrast   Final Result   IMPRESSION:    1. No evidence of acute fracture or subluxation in the cervical spine.   2. Degenerative changes in the cervical spine without significant   spinal canal or neural foraminal narrowing.       ASHLEY TIRADO MD            SYSTEM ID:  [id number]      CT Head w/o Contrast   Final Result   IMPRESSION:   No evidence of acute intracranial hemorrhage, mass, or   herniation.      ASHLEY TIRADO MD            SYSTEM ID:  [id number]      XR Chest Port 1 View   Final Result    IMPRESSION: Endotracheal tube tip 1.8 cm from the fidelia, consider   repositioning. Enteric tube tip below the margin of the study in the   left upper quadrant. No definite infiltrates.      MACHO LARSEN MD            SYSTEM ID:  [id number]      POC US ECHO LIMITED   Final Result              Medications:      Scheduled Meds:      doxycycline hyclate  100 mg Oral Q12H LYLY     lidocaine  10 mL Subcutaneous Once     naproxen  250 mg Oral BID w/meals     Continuous Infusions:    PRN Meds:  acetaminophen, glucose **OR** dextrose **OR** glucagon, naloxone **OR** naloxone **OR** naloxone **OR** naloxone, ondansetron **OR** ondansetron

## 2021-10-02 NOTE — PLAN OF CARE
End of Shift Summary  For vital signs and complete assessments, please see documentation flowsheets.     Pertinent assessments: A&Ox4. VSS. Neuro's stable. C/o blurry vision early in the evening, has no resolved. Regular diet, good appetite. LP site WDL. Possible discharge tomorrow, meds in med drawer.     Major Shift Events: Tx from ICU    Treatment Plan: Monitor for neuro deficits. Discharge back home with  tomorrow.

## 2021-10-02 NOTE — PHARMACY-ADMISSION MEDICATION HISTORY
Admission medication history interview status for this patient is complete. See [facility] admission navigator for allergy information, prior to admission medications and immunization status.     Medication history interview done, indicate source(s): Patient  Medication history resources (including written lists, pill bottles, clinic record): [facility]  Pharmacy: [id number] [id number] AT [place] [address]        Changes made to PTA medication list:  Added: None  Changed: None  Reported as Not Taking: None  Removed: None    Actions taken by pharmacist (provider contacted, etc): Sticky Note to provider     Additional medication history information: None    Medication reconciliation/reorder completed by provider prior to medication history?  N   (Y/N)       Prior to Admission medications    Medication Sig Last Dose Taking? Auth Provider   cefuroxime (CEFTIN) 500 MG tablet Take 1 tablet (500 mg) by mouth 2 times daily for 4 days  Yes Marcelo Nathan MD   EPINEPHrine (ANY BX GENERIC EQUIV) 0.3 MG/0.3ML injection 2-pack Inject 0.3 mLs (0.3 mg) into the muscle as needed for anaphylaxis 10/1/2021 at Unknown time Yes Bhavya Morris MD   ferrous sulfate (FE TABS) 325 (65 Fe) MG EC tablet Take 1 tablet (325 mg) by mouth 2 times daily Past Week at Unknown time Yes Bhavya Morris MD   montelukast (SINGULAIR) 10 MG tablet TAKE 1 TABLET BY MOUTH AT BEDTIME Past Week at Unknown time Yes Sanchez Matamoros MD

## 2021-10-02 NOTE — CONSULTS
Neurology Consult Note  [facility]       [2021]                                                                                       Admission Date: 2021  Hospital Day: 4      Patient: Lucho Lugo      : 1985  MRN:  [id number]     CC:      Chief Complaint   Patient presents with     Altered Mental Status       Consult Request:  Referring Provider:  Marcelo Nathan MD  Primary Care Provider:  Adelina Aguilera MD        HPI:  Lucho Lugo is a 36 year old yo female admitted for an episode of unresponsiveness on 21. She underwent detailed evaluation for the cause of unresponsiveness and had a spinal tap on 21. No definitive cause of unresponsiveness was determined, but patient started complaining of severe headache on 10/1/21. She had an epidural blood patch on 10/1/21 but she denies any benefit with the patch and continues to notice significant head pressure when she stands up. She is asymptomatic in supine position.          A complete review of symptoms was performed including vascular, infectious, cardiovascular, pulmonary, gastrointestinal, endocrinological, hematologic, dermatologic, musculoskeletal, and neurological. All were normal except as above.    PAST MEDICAL HISTORY:  ALLERGIES:   Allergies   Allergen Reactions     Pineapple Anaphylaxis     Orange Fruit [Citrus]      Amoxicillin Rash     Tobacco:    History   Smoking Status     Never Smoker   Smokeless Tobacco     Never Used     Alcohol:  Social History    Substance and Sexual Activity      Alcohol use: No    MEDICATIONS:       CURRENTLY SCHEDULED MEDICATIONS     doxycycline hyclate  100 mg Oral Q12H LYLY     lidocaine  10 mL Subcutaneous Once     naproxen  250 mg Oral BID w/meals          HOME MEDICATIONS  Medications Prior to Admission   Medication Sig Dispense Refill Last Dose     EPINEPHrine (ANY BX GENERIC EQUIV) 0.3 MG/0.3ML injection 2-pack Inject 0.3 mLs  (0.3 mg) into the muscle as needed for anaphylaxis 2 each 1 10/1/2021 at Unknown time     ferrous sulfate (FE TABS) 325 (65 Fe) MG EC tablet Take 1 tablet (325 mg) by mouth 2 times daily 60 tablet 0 Past Week at Unknown time     montelukast (SINGULAIR) 10 MG tablet TAKE 1 TABLET BY MOUTH AT BEDTIME 90 tablet 1 Past Week at Unknown time     MEDICAL HISTORY  Past Medical History:   Diagnosis Date     Diabetes (H)     gestational     H/O gestational diabetes mellitus, not currently pregnant     - diet     Indication for care in labor or delivery 2016     SURGICAL HISTORY  Past Surgical History:   Procedure Laterality Date      SECTION        SECTION N/A 2016    Procedure:  SECTION;  Surgeon: Keren Cervantes DO;  Location: [facility]      SECTION N/A 2018    Procedure:  SECTION;  Repeat  Section ;  Surgeon: Elizabeth Gudino MD;  Location: [facility]     CHOLECYSTOSTOMY       FAMILY HISTORY    No family history on file.  SOCIAL HISTORY  Social History     Socioeconomic History     Marital status:      Spouse name: Not on file     Number of children: Not on file     Years of education: Not on file     Highest education level: Not on file   Occupational History     Not on file   Tobacco Use     Smoking status: Never Smoker     Smokeless tobacco: Never Used   Vaping Use     Vaping Use: Never used   Substance and Sexual Activity     Alcohol use: No     Drug use: No     Sexual activity: Yes     Partners: Male   Other Topics Concern     Not on file   Social History Narrative     Not on file     Social Determinants of Health     Financial Resource Strain:      Difficulty of Paying Living Expenses:    Food Insecurity:      Worried About Running Out of Food in the Last Year:      Ran Out of Food in the Last Year:    Transportation Needs:      Lack of Transportation (Medical):      Lack of Transportation (Non-Medical):    Physical Activity:      Days of  "Exercise per Week:      Minutes of Exercise per Session:    Stress:      Feeling of Stress :    Social Connections:      Frequency of Communication with Friends and Family:      Frequency of Social Gatherings with Friends and Family:      Attends Sikh Services:      Active Member of Clubs or Organizations:      Attends Club or Organization Meetings:      Marital Status:    Intimate Partner Violence:      Fear of Current or Ex-Partner:      Emotionally Abused:      Physically Abused:      Sexually Abused:             Height: 160 cm (5' 2.99\")     Temp: 98.3  F (36.8  C)   Weight: 53.6 kg (118 lb 3.2 oz)    Temp src: Oral         BP: 129/70         Estimated body mass index is 20.94 kg/m  as calculated from the following:    Height as of this encounter: 1.6 m (5' 2.99\").    Weight as of this encounter: 53.6 kg (118 lb 3.2 oz).    Resp: 16   SpO2: 100 %   O2 Device: None (Room air)     Blood Pressure:   BP Readings from Last 3 Encounters:   10/02/21 129/70   21 119/87   21 115/62     T24 : Temp (24hrs), Av.8  F (37.1  C), Min:98.3  F (36.8  C), Max:99.6  F (37.6  C)       GENERAL EXAMINATION:  HEENT: PERRLA, EOMI.  Neck: Supple, No carotid bruits. No myofascial tender points.  Chest: Bilateral air entry present.  Heart: S1, S2 RRR.    NEUROLOGICAL EXAMINATION  Mental Status:  Patient is awake, alert, and oriented X3. Speech and language functions are normal.       Cranial Nerves: Pupils are equal and reactive to light.Visual fields are full to confrontation. Extraocular movements are intact. There is no nystagmus in the horizontal or vertical planes.No facial sensory deficits. No facial weakness. The tongue is midline.     Motor: Muscle tone is normal. There is no pronator drift. There is no muscle atrophy. The strength is 5/5 in upper and lower extremities bilaterally. Deep tendon reflexes are 2+ and symmetric in his biceps, triceps, knees, and ankles. Plantars are flexor. .         Coordination: " .Finger to nose - normal.        .  LABORATORY RESULTS      Recent Labs   Lab 10/02/21  0739 09/30/21  0604 09/29/21  1335   WBC 8.2 11.1* 16.4*   HGB 10.8* 9.0* 11.4*   HCT 36.2 31.0* 39.0   MCV 73* 72* 72*    195 390     Recent Labs   Lab 10/02/21  0739 10/01/21  1905 10/01/21  1605 10/01/21  1227 10/01/21  0746 10/01/21  0523 09/30/21  0758 09/30/21  0748 09/30/21  0604 09/29/21  1933 09/29/21  1335 09/29/21  1335     --   --   --   --   --   --   --  141  --   --  139   POTASSIUM 4.0  --   --   --   --  3.7  --   --  4.2   < >  --  2.8*   CHLORIDE 111*  --   --   --   --   --   --   --  114*  --   --  107   CO2 21  --   --   --   --   --   --   --  20  --   --  23   ANIONGAP 8  --   --   --   --   --   --   --  7  --   --  9   GLC 85  --  86 104*   < >  --    < >  --  144*   < >  --  138*   BUN 9  --   --   --   --   --   --   --  8  --   --  13   CR 0.48*  --   --   --   --   --   --   --  0.55  --   --  0.70   GFRESTIMATED >90  --   --   --   --   --   --   --  >90  --   --  >90   SONDRA 8.7  --   --   --   --   --   --   --  7.3*  --   --  8.9   MAG 2.3  --   --   --   --  2.5*  --  2.5*  --   --    < > 2.3   PHOS 2.6 2.5  --   --   --  2.0*   < >  --   --    < >  --   --    PROTTOTAL  --   --   --   --   --   --   --   --  6.4*  --   --  7.7   ALBUMIN  --   --   --   --   --   --   --   --  3.0*  --   --  3.6   BILITOTAL  --   --   --   --   --   --   --   --  0.3  --   --  0.3   ALKPHOS  --   --   --   --   --   --   --   --  57  --   --  71   AST  --   --   --   --   --   --   --   --  30  --   --  21   ALT  --   --   --   --   --   --   --   --  26  --   --  29    < > = values in this interval not displayed.     No results for input(s): CKT in the last 168 hours.    Invalid input(s): CK, CK TOTAL  Recent Labs   Lab 10/01/21  0523 09/30/21  0900 09/30/21  0015   LACT 1.0 2.6* 3.1*     Recent Labs   Lab 09/29/21  1345   COLOR Straw   APPEARANCE Clear   URINEGLC 100 *   URINEBILI Negative    URINEKETONE Negative   SG 1.016   UBLD Negative   URINEPH 6.5   PROTEIN Negative   NITRITE Negative   LEUKEST Negative   RBCU 1   WBCU 0       IMAGING RESULTS     Recent Results (from the past 24 hour(s))   XR Blood Patch Procedure    Narrative    XR BLOOD PATCH PROCEDURE                 10/1/2021 3:23 PM       History:  Post lumbar puncture headache.     PROCEDURE:  The procedure, indications, risks (including the risk of  infection, bleeding and reaction to contrast material and  medications), and alternatives to therapy were discussed with the  patient and informed consent was obtained for the procedure with the  assistance of her sister in law on the phone for interpreting.  The  lower back was prepped and draped in the usual fashion.  Lidocaine 1%  was used for local anesthesia.  Using fluoroscopic guidance, a  3.5  inch 22 gauge spinal needle was advanced into the epidural space via  interlaminar approach at the L4-5 level. This matches the previous  skin puncture site and level of LP per procedure dictation from the  ER.  A small amount of contrast was injected confirming epidural  location of the needle.  Subsequently, 20 mL of the patient's blood  was injected.  The needle was removed.  The patient tolerated the  procedure well and there were no immediate complications.        Fluoroscopy time: 0.3 minutes  Number of Images: 4    Meds given: 3 mL 1% lidocaine, a 3 mL Isovue-M 200      Impression    IMPRESSION:  Technically successful epidural blood patch injection at   L4-5    ABDIRIZAK SCOTT PA-C         SYSTEM ID:  [id number]   MRV Brain wo & w Contrast    Narrative    EXAM: MRV BRAIN  WITHOUT AND WITH CONTRAST  LOCATION: [facility]  DATE/TIME: 10/02/2021, 9:01 AM    INDICATION: Cerebrospinal fluid leak.  COMPARISON: None.  CONTRAST: 5.5 mL Gadavist.  TECHNIQUE: MR venography was performed through the dural venous sinuses without and with contrast.     FINDINGS:    HEAD MRV:   The  internal cerebral veins, vein of Twin, straight sinus, superior sagittal sinuses, transverse sinuses, sigmoid sinuses, and proximal jugular veins are patent. The right transverse sinus is dominant. There is a very small left transverse sinus with a   medium-sized left sigmoid sinus. There is no evidence for any acute dural venous thrombosis or any significant stenosis.      Impression    IMPRESSION:    HEAD MRV:   1.  No dural venous sinus thrombosis or significant stenosis.         _____________________________________________________________________________    _____________________________________________________________________________          ASSESSMENT     Positional headache- Post spinal headache due to CSF hypotension.   Unresponsive spell- Unclear etiology. No evidence of seizure or infection.        RECOMMENDATIONS   Repeat Blood patch.  Increase fluid intake and caffeine intake (5-6 cups of coffee or equivalent per day).

## 2021-10-02 NOTE — PLAN OF CARE
End of Shift Summary  For vital signs and complete assessments, please see documentation flowsheets.     Pertinent assessments: A&Ox4. VSS. C/o severe headache after getting out of bed, given tylenol, similar episode to yesterday. No c/o blurry vision overnight. LP site WDL. Possible discharge today, meds in med drawer. Up SBA.  Major Shift Events: uneventful  Treatment Plan: Monitor for neuro deficits. Discharge back home with  today.

## 2021-10-02 NOTE — PROVIDER NOTIFICATION
Page to MD Sifuentes due to patient continues to complain of very severe headache. Only pain med of tylenol not available due to recent administration.     MD returned page with New orders of naproxen, Neurology, MRI and advised to drink coffee.

## 2021-10-03 PROCEDURE — 250N000013 HC RX MED GY IP 250 OP 250 PS 637: Performed by: HOSPITALIST

## 2021-10-03 PROCEDURE — 99232 SBSQ HOSP IP/OBS MODERATE 35: CPT | Performed by: HOSPITALIST

## 2021-10-03 PROCEDURE — 120N000001 HC R&B MED SURG/OB

## 2021-10-03 RX ORDER — NAPROXEN 250 MG/1
250 TABLET ORAL 2 TIMES DAILY WITH MEALS
Status: COMPLETED | OUTPATIENT
Start: 2021-10-03 | End: 2021-10-03

## 2021-10-03 RX ADMIN — ACETAMINOPHINE, CAFFEINE 2 TABLET: 500; 65 TABLET, FILM COATED ORAL at 04:48

## 2021-10-03 RX ADMIN — NAPROXEN 250 MG: 250 TABLET ORAL at 19:02

## 2021-10-03 RX ADMIN — DOXYCYCLINE HYCLATE 100 MG: 100 CAPSULE ORAL at 08:18

## 2021-10-03 RX ADMIN — DOXYCYCLINE HYCLATE 100 MG: 100 CAPSULE ORAL at 19:49

## 2021-10-03 RX ADMIN — NAPROXEN 250 MG: 250 TABLET ORAL at 10:34

## 2021-10-03 RX ADMIN — ACETAMINOPHINE, CAFFEINE 2 TABLET: 500; 65 TABLET, FILM COATED ORAL at 17:55

## 2021-10-03 ASSESSMENT — ACTIVITIES OF DAILY LIVING (ADL)
ADLS_ACUITY_SCORE: 9
ADLS_ACUITY_SCORE: 7
ADLS_ACUITY_SCORE: 9

## 2021-10-03 ASSESSMENT — MIFFLIN-ST. JEOR
SCORE: 1181.08
SCORE: 1180.63

## 2021-10-03 NOTE — PLAN OF CARE
End of Shift Summary  For vital signs and complete assessments, please see documentation flowsheets.     Pertinent assessments: A&Ox4. VSS. C/o headache starting to come back around 0445, given excedrin. No blurry vision reported. Slept well overnight.    Major Shift Events: uneventful    Treatment Plan: Monitor for neuro deficits. Continue to treat pain as needed, potential need for additional blood patch.

## 2021-10-03 NOTE — PROGRESS NOTES
[facility]    Hospitalist Progress Note             Date of Admission:  9/29/2021                   Day of hospitalization: 4    Assessment and Plan:      Lucho Lugo is a 36-year-old female who presented to the hospital for unresponsiveness.  The patient was feeling unwell earlier today after receiving influenza vaccine earlier this morning.  She was brought to the hospital by her .  By the time she arrived, she was essentially apneic and was intubated in the Emergency Department.  There was no response to Narcan.  She did receive an epinephrine pen injection en route given a family concern for possible anaphylaxis.  She has had respiratory failure due to anaphylaxis previously.     The patient is hemodynamically stable in the Emergency Department.  Workup in the ER notable for elevated lactic acid on presentation, hypokalemia, and leukocytosis.  Imaging unremarkable.  The patient was admitted to the Intensive Care Unit, intubated on mechanical ventilator.     Since arrival to the ICU the patient has remained stable.  She had some mild hypotension that responded to IVF bolus.  No pressor requirements.  was treated with empiric antibiotics       The patient was extubated on 9/30.     10/1 she was complaining of severe posterior neck and headache this AM.  She was sitting up in bed.  SHe was tearful.  HA started yesterday evening.  She has no focal neurologic signs and no hx of trauma.  No TTP on exam and neck appears normal.   After patient was placed in a flat position her symptoms resolved.      Suspect  a post-LP HA.  She had blood patch placed 10/1    Today headache persists. MRV ordered negative.          #  Unresponsiveness with reported apneic episode:  Etiology not clear.  Although family was worried about possible anaphylaxis as a cause, no clear signs of oral swelling or other signs of anaphylaxis on exam.  Differential diagnosis included sepsis in light of her  "leukocytosis.  Arrhythmia or seizure seem less likely here.  No signs of polysubstance use on urine toxicology screen.  As she received the influenza vaccine the morning prior to becoming acutely ill, vaccine reaction could also be a consideration.  Reportedly, she has received an influenza vaccine in the past without any side effects.  echocardiogram normal  - MRV normal  - change IV ceftriaxone to doxycyline     #headache post LP  - received blood patch, ordered Nsaids and tylenol, neurology consult for any further input  - MRV negative dural sinus thrombosis  -Neurology recommends another blood patch patient does not want to be transferred to [facility] thus will have her blood patch tomorrow morning n.p.o. midnight    #  Leukocytosis:  Possible stress reaction due to above.  Sepsis also a possibility.  Workup in the ER included blood cultures, imaging and lumbar puncture, all of which are unremarkable.  changed ceftriaxone to doxycycline  Blood cx remain neg.  #  Hypokalemia:  Potassium 2.8 on admit.  Supplemented.    #  Lactic acidosis.  Improved with IVF  #  Urine toxicology screen positive for cannabinoids.     # Code status: Full   # Anticipated discharge date and Disposition:1-2 days pending clnilcal improvement  # DVT: SCDs  # IVF: none                       Raquel Sifuentes MD  Text Page (7am - 6pm, M-F)               Subjective   Chief Complaint:  Headache  Subective: Complains of severe subfrontal headache improves with lying down. Discussed about transfer to [facility] does not want to go to [facility].     Objective   /63 (BP Location: Right arm)   Pulse 68   Temp 98.2  F (36.8  C) (Oral)   Resp 16   Ht 1.6 m (5' 2.99\")   Wt 52.7 kg (116 lb 1.6 oz)   SpO2 99%   BMI 20.57 kg/m       Physical Exam  General: Pt in NAD, normal appearance  HEENT: OP clear MMM, no JVD  Lungs: Clear to Auscultation Bilateral, normal breathing  without accessory muscle usage, no wheezing, rhonchi or crackles  Cardiac: " +S1, S2, RRR, no MRG, no edema  Abdominal: normal bowel sounds, NT/ND  Skin: warm, dry, normal turgor, no rash  Psyche: A& O x3, appropriate affect             Intake/Output Summary (Last 24 hours) at 10/2/2021 1304  Last data filed at 10/1/2021 2200  Gross per 24 hour   Intake 607.5 ml   Output --   Net 607.5 ml           Labs and Imaging Results:      Recent Labs   Lab 10/02/21  0739 09/30/21  0604   WBC 8.2 11.1*   HGB 10.8* 9.0*    195        Recent Labs   Lab 10/02/21  0739 09/30/21  0604    141   CO2 21 20   BUN 9 8      No results for input(s): INR, PTT in the last 168 hours.   No results for input(s): CKMB in the last 168 hours.    Invalid input(s): TROPONINT     Recent Labs   Lab 09/30/21  0604 09/29/21  1335   ALBUMIN 3.0* 3.6   AST 30 21   ALT 26 29   ALKPHOS 57 71        Micro:     Radio:  MRV Brain wo & w Contrast   Final Result   IMPRESSION:      HEAD MRV:    1.  No dural venous sinus thrombosis or significant stenosis.         XR Blood Patch Procedure   Final Result   IMPRESSION:  Technically successful epidural blood patch injection at    L4-5      ABDIRIZAK SCOTT PA-C            SYSTEM ID:  [id number]      Echocardiogram Complete   Final Result      CT Chest (PE) Abdomen Pelvis w Contrast   Final Result   IMPRESSION:   1. No pulmonary embolism demonstrated.   2. Minimal probable dependent atelectasis vs. less likely infiltrate   in the lungs.   3. No acute process demonstrated in the abdomen and pelvis.      MACHO LARSEN MD            SYSTEM ID:  [id number]      CT Cervical Spine w/o Contrast   Final Result   IMPRESSION:    1. No evidence of acute fracture or subluxation in the cervical spine.   2. Degenerative changes in the cervical spine without significant   spinal canal or neural foraminal narrowing.       ASHLEY TIRADO MD            SYSTEM ID:  [id number]      CT Head w/o Contrast   Final Result   IMPRESSION:   No evidence of acute intracranial hemorrhage, mass, or   herniation.      ASHLEY  KING TIRADO MD            SYSTEM ID:  [id number]      XR Chest Port 1 View   Final Result   IMPRESSION: Endotracheal tube tip 1.8 cm from the fidelia, consider   repositioning. Enteric tube tip below the margin of the study in the   left upper quadrant. No definite infiltrates.      MACHO LARSEN MD            SYSTEM ID:  [id number]      POC US ECHO LIMITED   Final Result              Medications:      Scheduled Meds:      acetaminophen-caffeine  2 tablet Oral Once     doxycycline hyclate  100 mg Oral Q12H LYLY     lidocaine  10 mL Subcutaneous Once     naproxen  250 mg Oral BID w/meals     Continuous Infusions:    PRN Meds:  acetaminophen-caffeine, glucose **OR** dextrose **OR** glucagon, naloxone **OR** naloxone **OR** naloxone **OR** naloxone, ondansetron **OR** ondansetron

## 2021-10-04 ENCOUNTER — APPOINTMENT (OUTPATIENT)
Dept: GENERAL RADIOLOGY | Facility: CLINIC | Age: 36
End: 2021-10-04
Attending: HOSPITALIST
Payer: COMMERCIAL

## 2021-10-04 LAB
BACTERIA BLD CULT: NO GROWTH
BACTERIA BLD CULT: NO GROWTH
BACTERIA CSF CULT: NO GROWTH
GRAM STAIN RESULT: NORMAL
GRAM STAIN RESULT: NORMAL
MAGNESIUM SERPL-MCNC: 2.1 MG/DL (ref 1.6–2.3)
PHOSPHATE SERPL-MCNC: 3 MG/DL (ref 2.5–4.5)
POTASSIUM BLD-SCNC: 4 MMOL/L (ref 3.4–5.3)

## 2021-10-04 PROCEDURE — 250N000013 HC RX MED GY IP 250 OP 250 PS 637: Performed by: HOSPITALIST

## 2021-10-04 PROCEDURE — 250N000011 HC RX IP 250 OP 636: Performed by: INTERNAL MEDICINE

## 2021-10-04 PROCEDURE — 120N000001 HC R&B MED SURG/OB

## 2021-10-04 PROCEDURE — 99232 SBSQ HOSP IP/OBS MODERATE 35: CPT | Performed by: HOSPITALIST

## 2021-10-04 PROCEDURE — 3E0R3GC INTRODUCTION OF OTHER THERAPEUTIC SUBSTANCE INTO SPINAL CANAL, PERCUTANEOUS APPROACH: ICD-10-PCS | Performed by: PHYSICIAN ASSISTANT

## 2021-10-04 PROCEDURE — 83735 ASSAY OF MAGNESIUM: CPT | Performed by: HOSPITALIST

## 2021-10-04 PROCEDURE — 250N000009 HC RX 250: Performed by: PHYSICIAN ASSISTANT

## 2021-10-04 PROCEDURE — 84132 ASSAY OF SERUM POTASSIUM: CPT | Performed by: HOSPITALIST

## 2021-10-04 PROCEDURE — 84100 ASSAY OF PHOSPHORUS: CPT | Performed by: HOSPITALIST

## 2021-10-04 PROCEDURE — 36415 COLL VENOUS BLD VENIPUNCTURE: CPT | Performed by: HOSPITALIST

## 2021-10-04 PROCEDURE — 999N000127 HC STATISTIC PERIPHERAL IV START W US GUIDANCE

## 2021-10-04 PROCEDURE — 77003 FLUOROGUIDE FOR SPINE INJECT: CPT

## 2021-10-04 RX ORDER — NAPROXEN 250 MG/1
250 TABLET ORAL 2 TIMES DAILY WITH MEALS
Status: DISCONTINUED | OUTPATIENT
Start: 2021-10-04 | End: 2021-10-04

## 2021-10-04 RX ORDER — NAPROXEN 250 MG/1
250 TABLET ORAL 2 TIMES DAILY PRN
Status: DISCONTINUED | OUTPATIENT
Start: 2021-10-04 | End: 2021-10-04

## 2021-10-04 RX ADMIN — LIDOCAINE HYDROCHLORIDE 10 ML: 10 INJECTION, SOLUTION EPIDURAL; INFILTRATION; INTRACAUDAL; PERINEURAL at 14:23

## 2021-10-04 RX ADMIN — DOXYCYCLINE HYCLATE 100 MG: 100 CAPSULE ORAL at 20:02

## 2021-10-04 RX ADMIN — DOXYCYCLINE HYCLATE 100 MG: 100 CAPSULE ORAL at 09:26

## 2021-10-04 RX ADMIN — ONDANSETRON 4 MG: 4 TABLET, ORALLY DISINTEGRATING ORAL at 13:01

## 2021-10-04 RX ADMIN — ACETAMINOPHINE, CAFFEINE 2 TABLET: 500; 65 TABLET, FILM COATED ORAL at 11:40

## 2021-10-04 ASSESSMENT — ACTIVITIES OF DAILY LIVING (ADL)
ADLS_ACUITY_SCORE: 5

## 2021-10-04 ASSESSMENT — MIFFLIN-ST. JEOR: SCORE: 1192.87

## 2021-10-04 NOTE — PLAN OF CARE
End of Shift Summary  For vital signs and complete assessments, please see documentation flowsheets.     Pertinent assessments: A&Ox4. VSS. No c/o headache overnight. Up independently. No blurry vision reported. NPO initiated at 0000 for blood patch today. C/o heartburn, denies intervention.    Major Shift Events: uneventful    Treatment Plan: Monitor for neuro deficits. Continue to treat pain as needed, plan for additional blood patch Monday.

## 2021-10-04 NOTE — PROGRESS NOTES
[facility]    Hospitalist Progress Note             Date of Admission:  9/29/2021                   Day of hospitalization: 5    Assessment and Plan:      Lucho Lugo is a 36-year-old female who presented to the hospital for unresponsiveness.  The patient was feeling unwell earlier today after receiving influenza vaccine earlier this morning.  She was brought to the hospital by her .  By the time she arrived, she was essentially apneic and was intubated in the Emergency Department.  There was no response to Narcan.  She did receive an epinephrine pen injection en route given a family concern for possible anaphylaxis.  She has had respiratory failure due to anaphylaxis previously.     The patient is hemodynamically stable in the Emergency Department.  Workup in the ER notable for elevated lactic acid on presentation, hypokalemia, and leukocytosis.  Imaging unremarkable.  The patient was admitted to the Intensive Care Unit, intubated on mechanical ventilator.     Since arrival to the ICU the patient has remained stable.  She had some mild hypotension that responded to IVF bolus.  No pressor requirements.  was treated with empiric antibiotics       The patient was extubated on 9/30.     10/1 she was complaining of severe posterior neck and headache this AM.  She was sitting up in bed.  SHe was tearful.  HA started yesterday evening.  She has no focal neurologic signs and no hx of trauma.  No TTP on exam and neck appears normal.   After patient was placed in a flat position her symptoms resolved.      Suspect  a post-LP HA.  She had blood patch placed 10/1    Today headache persists. MRV ordered negative.     #  Unresponsiveness with reported apneic episode:  Etiology not clear.  Although family was worried about possible anaphylaxis as a cause, no clear signs of oral swelling or other signs of anaphylaxis on exam.  Differential diagnosis included sepsis in light of her  "leukocytosis.  Arrhythmia or seizure seem less likely here.  No signs of polysubstance use on urine toxicology screen.  As she received the influenza vaccine the morning prior to becoming acutely ill, vaccine reaction could also be a consideration.  Reportedly, she has received an influenza vaccine in the past without any side effects.  echocardiogram normal  - MRV normal  - change IV ceftriaxone to doxycyline, discussed with patient and nursing to take doxycycline sitting up right as it can exacerbate reflux    # reflux symptoms  - will hold NSAID, as above, doxy upright. She does not want to start reflux medications for now. If symptoms not improved with above recommendations will start omeprazole    #headache post LP  - received blood patch, ordered Nsaids and tylenol, neurology consult for any further input, repeat blood patch ordered today. Patient over weekend did not want to go to [facility]  - MRV negative dural sinus thrombosis    #  Leukocytosis:  Possible stress reaction due to above.  Sepsis also a possibility.  Workup in the ER included blood cultures, imaging and lumbar puncture, all of which are unremarkable.  changed ceftriaxone to doxycycline  Blood cx remain neg.  #  Hypokalemia:  Potassium 2.8 on admit.  Supplemented.    #  Lactic acidosis.  Improved with IVF  #  Urine toxicology screen positive for cannabinoids.     # Code status: Full   # Anticipated discharge date and Disposition:1-2 days pending clnilcal improvement  # DVT: SCDs  # IVF: none                       Raquel Sifuentes MD  Text Page (7am - 6pm, M-F)               Subjective   Chief Complaint:  Headache  Subective: Complains of severe subfrontal headache improves with lying down. Otherwise minimal complaints. Has reflux symptoms       Objective   /67 (BP Location: Right arm)   Pulse 72   Temp 97.9  F (36.6  C) (Oral)   Resp 16   Ht 1.6 m (5' 2.99\")   Wt 53.9 kg (118 lb 12.8 oz)   SpO2 100%   BMI 21.05 kg/m       Physical " Exam  General: Pt in NAD, normal appearance  HEENT: OP clear MMM, no JVD  Lungs: Clear to Auscultation Bilateral, normal breathing  without accessory muscle usage, no wheezing, rhonchi or crackles  Cardiac: +S1, S2, RRR, no MRG, no edema  Abdominal: normal bowel sounds, NT/ND  Skin: warm, dry, normal turgor, no rash  Psyche: A& O x3, appropriate affect             Intake/Output Summary (Last 24 hours) at 10/2/2021 1304  Last data filed at 10/1/2021 2200  Gross per 24 hour   Intake 607.5 ml   Output --   Net 607.5 ml           Labs and Imaging Results:      Recent Labs   Lab 10/02/21  0739 09/30/21  0604   WBC 8.2 11.1*   HGB 10.8* 9.0*    195        Recent Labs   Lab 10/02/21  0739 09/30/21  0604    141   CO2 21 20   BUN 9 8      No results for input(s): INR, PTT in the last 168 hours.   No results for input(s): CKMB in the last 168 hours.    Invalid input(s): TROPONINT     Recent Labs   Lab 09/30/21  0604 09/29/21  1335   ALBUMIN 3.0* 3.6   AST 30 21   ALT 26 29   ALKPHOS 57 71        Micro:     Radio:  MRV Brain wo & w Contrast   Final Result   IMPRESSION:      HEAD MRV:    1.  No dural venous sinus thrombosis or significant stenosis.         XR Blood Patch Procedure   Final Result   IMPRESSION:  Technically successful epidural blood patch injection at    L4-5      ABDIRIZAK SCOTT PA-C            SYSTEM ID:  [id number]      Echocardiogram Complete   Final Result      CT Chest (PE) Abdomen Pelvis w Contrast   Final Result   IMPRESSION:   1. No pulmonary embolism demonstrated.   2. Minimal probable dependent atelectasis vs. less likely infiltrate   in the lungs.   3. No acute process demonstrated in the abdomen and pelvis.      MACHO LARSEN MD            SYSTEM ID:  [id number]      CT Cervical Spine w/o Contrast   Final Result   IMPRESSION:    1. No evidence of acute fracture or subluxation in the cervical spine.   2. Degenerative changes in the cervical spine without significant   spinal canal or neural  foraminal narrowing.       ASHLEY TIRADO MD            SYSTEM ID:  [id number]      CT Head w/o Contrast   Final Result   IMPRESSION:   No evidence of acute intracranial hemorrhage, mass, or   herniation.      ASHLEY TIRADO MD            SYSTEM ID:  [id number]      XR Chest Port 1 View   Final Result   IMPRESSION: Endotracheal tube tip 1.8 cm from the fidelia, consider   repositioning. Enteric tube tip below the margin of the study in the   left upper quadrant. No definite infiltrates.      MACHO LARSEN MD            SYSTEM ID:  [id number]      POC US ECHO LIMITED   Final Result      XR Blood Patch Procedure    (Results Pending)           Medications:      Scheduled Meds:      acetaminophen-caffeine  2 tablet Oral Once     doxycycline hyclate  100 mg Oral Q12H LYLY     lidocaine  10 mL Subcutaneous Once     Continuous Infusions:    PRN Meds:  acetaminophen-caffeine, glucose **OR** dextrose **OR** glucagon, naloxone **OR** naloxone **OR** naloxone **OR** naloxone, ondansetron **OR** ondansetron

## 2021-10-04 NOTE — PLAN OF CARE
End of Shift Summary  For vital signs and complete assessments, please see documentation flowsheets.     Pertinent assessments: A&Ox4. VSS. C/o headache 7-8/10 given naproxen and excedrin migraine and continue to encourage caffeine. No blurry vision reported. Appears in better spirits, more smiles and interacting with staff and family via phone.    Major Shift Events: uneventful    Treatment Plan: Monitor for neuro deficits. Continue to treat pain as needed, plan for additional blood patch Monday.     Bedside Nurse: Ayanna Murdock RN

## 2021-10-04 NOTE — PROGRESS NOTES
Assisted PA in blood patchc to tx Pt headache. Pt tolerated procedure well with localized anesthetic per MAR. Report given to leonard. Instructions given top Pt and floor RN.

## 2021-10-04 NOTE — PROGRESS NOTES
RADIOLOGY PROCEDURE NOTE  Patient name: Lucho Lugo  MRN: [id number]  : 1985    Pre-procedure diagnosis: Postural headache  Post-procedure diagnosis: Same    Procedure Date/Time: 2021  2:48 PM  Procedure: Blood patch at L2-L3  Estimated blood loss: 20 ml of blood injected  Specimen(s) collected with description: none  The patient tolerated the procedure well with no immediate complications.  Significant findings:none    See imaging dictation for procedural details.    Provider name: Christiano Ziegler PA-C  Assistant(s):None

## 2021-10-04 NOTE — PLAN OF CARE
1725-1946: Pt declines Syriac . Pt resting comfortably. VSS. Pain managed with Excedrin and laying flat. Pt c/o heartburn MD aware, pt declines interventions/medication. Transfers with SBA. Tolerated light diet prior to procedure. VSS after procedure dressing CDI, pt laying flat.

## 2021-10-05 VITALS
TEMPERATURE: 98 F | HEART RATE: 84 BPM | RESPIRATION RATE: 18 BRPM | BODY MASS INDEX: 20.75 KG/M2 | WEIGHT: 117.1 LBS | DIASTOLIC BLOOD PRESSURE: 77 MMHG | SYSTOLIC BLOOD PRESSURE: 113 MMHG | HEIGHT: 63 IN | OXYGEN SATURATION: 100 %

## 2021-10-05 PROCEDURE — 250N000013 HC RX MED GY IP 250 OP 250 PS 637: Performed by: HOSPITALIST

## 2021-10-05 PROCEDURE — 99239 HOSP IP/OBS DSCHRG MGMT >30: CPT | Performed by: HOSPITALIST

## 2021-10-05 RX ORDER — DOXYCYCLINE 100 MG/1
100 CAPSULE ORAL EVERY 12 HOURS
Qty: 2 CAPSULE | Refills: 0 | Status: SHIPPED | OUTPATIENT
Start: 2021-10-05 | End: 2021-10-05

## 2021-10-05 RX ORDER — MECLIZINE HCL 12.5 MG 12.5 MG/1
12.5 TABLET ORAL 3 TIMES DAILY PRN
Status: DISCONTINUED | OUTPATIENT
Start: 2021-10-05 | End: 2021-10-05 | Stop reason: HOSPADM

## 2021-10-05 RX ADMIN — DOXYCYCLINE HYCLATE 100 MG: 100 CAPSULE ORAL at 09:46

## 2021-10-05 ASSESSMENT — ACTIVITIES OF DAILY LIVING (ADL)
ADLS_ACUITY_SCORE: 5

## 2021-10-05 ASSESSMENT — MIFFLIN-ST. JEOR: SCORE: 1185.16

## 2021-10-05 NOTE — PLAN OF CARE
To Do:  End of Shift Summary  For vital signs and complete assessments, please see documentation flowsheets.     Pertinent assessments: A&Ox4, up SBA,  no blurry vision reported or headache reported.    Major Shift Events: Uneventful.    Treatment Plan: Monitor for neuro deficits, continue to treat pain as needed, possible discharge in AM.     Bedside Nurse: Beverly Em RN

## 2021-10-05 NOTE — DISCHARGE SUMMARY
Discharge Summary  Hospitalist Service      Lucho Lugo MRN# [id number]   YOB: 1985 Age: 36 year old     Date of Admission:  9/29/2021  Date of Discharge:  10/5/2021  Admitting Physician:  Marcelo Nathan MD  Discharge Physician: Raquel Sifuentes MD   Discharging Service: Hospitalist Service     Primary Provider: Adelina Aguilera  Primary Care Physician Phone Number: [phone]         Discharge Diagnoses/Problem Oriented Hospital Course (Providers):      Discharge Diagnoses     #  Unresponsiveness with reported apneic episode, Encephalopathy unknown  # reflux symptoms  #headache post LP  #  Leukocytosis  #  Hypokalemia  #  Urine toxicology: +MJ  Hospital Course   Lucho Lugo is a 36-year-old female who presented to the hospital for unresponsiveness.  The patient was feeling unwell earlier today after receiving influenza vaccine earlier this morning.  She was brought to the hospital by her .  By the time she arrived, she was essentially apneic and was intubated in the Emergency Department.  There was no response to Narcan.  She did receive an epinephrine pen injection en route given a family concern for possible anaphylaxis.  She has had respiratory failure due to anaphylaxis previously.     The patient is hemodynamically stable in the Emergency Department.  Workup in the ER notable for elevated lactic acid on presentation, hypokalemia, and leukocytosis.  Imaging unremarkable.  The patient was admitted to the Intensive Care Unit, intubated on mechanical ventilator.     Since arrival to the ICU the patient has remained stable.  She had some mild hypotension that responded to IVF bolus.  No pressor requirements.  was treated with empiric antibiotics       The patient was extubated on 9/30.     10/1 she was complaining of severe posterior neck and headache this AM.  She was sitting up in bed.  SHe was tearful.  HA started yesterday evening.  She has no focal neurologic  signs and no hx of trauma.  No TTP on exam and neck appears normal.   After patient was placed in a flat position her symptoms resolved.      Suspect  a post-LP HA.  She had blood patch placed 10/1     Received blood patch 10/4 again with significant improvements of symptoms.  He did have vertigo-like symptoms on the day of discharge however symptoms resolved and patient requested to be discharged home.  She is doing well and will be discharged     #  Unresponsiveness with reported apneic episode:  Etiology not clear.  Although family was worried about possible anaphylaxis as a cause, no clear signs of oral swelling or other signs of anaphylaxis on exam.  Differential diagnosis included sepsis in light of her leukocytosis.  Arrhythmia or seizure seem less likely here.  No signs of polysubstance use on urine toxicology screen.  As she received the influenza vaccine the morning prior to becoming acutely ill, vaccine reaction could also be a consideration.  Reportedly, she has received an influenza vaccine in the past without any side effects.  echocardiogram normal  - MRV normal  - No infectious source found, will stop further antibiotics did receive 7 days total antibiotics in hoptal     # reflux symptoms  - resolved     #headache post LP  - received blood patch, ordered Nsaids and tylenol, neurology consult for any further input, repeat blood patch ordered today. Patient over weekend did not want to go to [facility]  - MRV negative dural sinus thrombosis     #  Leukocytosis:  Possible stress reaction due to above.  Sepsis also a possibility.  Workup in the ER included blood cultures, imaging and lumbar puncture, all of which are unremarkable.    #  Hypokalemia:  Potassium 2.8 on admit.  Supplemented.    #  Lactic acidosis.  Improved with IVF  #  Urine toxicology screen positive for cannabinoids.              Code Status:      Full Code         Important Results:                  Pending Results:        Unresulted  "Labs Ordered in the Past 30 Days of this Admission     No orders found from 8/30/2021 to 9/30/2021.               Discharge Instructions and Follow-Up:      Follow-up Appointments     Follow-up and recommended labs and tests       Follow up with primary provider as needed.  Return to ER for recurrent   symptoms.                  Discharge Disposition:        Discharged to home          Discharge Medications:        Discharge Medication List as of 10/5/2021  1:10 PM      CONTINUE these medications which have NOT CHANGED    Details   EPINEPHrine (ANY BX GENERIC EQUIV) 0.3 MG/0.3ML injection 2-pack Inject 0.3 mLs (0.3 mg) into the muscle as needed for anaphylaxis, Disp-2 each, R-1, E-Prescribe      ferrous sulfate (FE TABS) 325 (65 Fe) MG EC tablet Take 1 tablet (325 mg) by mouth 2 times daily, Disp-60 tablet, R-0, E-Prescribe      montelukast (SINGULAIR) 10 MG tablet TAKE 1 TABLET BY MOUTH AT BEDTIME, Disp-90 tablet,R-1, E-Prescribe         STOP taking these medications       doxycycline hyclate (VIBRAMYCIN) 100 MG capsule Comments:   Reason for Stopping:                    Allergies:         Allergies   Allergen Reactions     Pineapple Anaphylaxis     Orange Fruit [Citrus]      Amoxicillin Rash            Consultations This Hospital Stay:        Consultation during this admission received from neurology          Condition and Physical Exam on Discharge:        Discharge condition: Stable   Discharge vitals: Blood pressure 113/77, pulse 84, temperature 98  F (36.7  C), temperature source Oral, resp. rate 18, height 1.6 m (5' 2.99\"), weight 53.1 kg (117 lb 1.6 oz), SpO2 100 %, not currently breastfeeding.   General: Pt in NAD, normal appearance  HEENT: OP clear MMM, no JVD  Lungs: Clear to Auscultation Bilateral, normal breathing  without accessory muscle usage, no wheezing, rhonchi or crackles  Cardiac: +S1, S2, RRR, no MRG, no edema  Abdominal: normal bowel sounds, NT/ND, no hepatosplenomegaly  Skin: warm, dry, " normal turgor, no rash  Psyche: A& O x3, appropriate affect            Discharge Orders for Skilled Facility (from Discharge Orders):        After Care Instructions     Activity      Your activity upon discharge: activity as tolerated         Diet      Follow this diet upon discharge: regular diet                    Rehab orders for Skilled Facility (from Discharge Orders):               Discharge Time:      Greater than 30 minutes.        Image Results From This Hospital Stay (For Non-EPIC Providers):        Results for orders placed or performed during the hospital encounter of 09/29/21   XR Chest Port 1 View    Narrative    CHEST ONE VIEW  9/29/2021 2:00 PM     HISTORY: Loss of consciousness.     COMPARISON: February 15, 2021      Impression    IMPRESSION: Endotracheal tube tip 1.8 cm from the fidelia, consider  repositioning. Enteric tube tip below the margin of the study in the  left upper quadrant. No definite infiltrates.    MACHO LARSEN MD         SYSTEM ID:  [id number]   CT Head w/o Contrast    Narrative    CT SCAN OF THE HEAD WITHOUT CONTRAST   9/29/2021 2:23 PM     HISTORY: Altered mental status.    TECHNIQUE:  Axial images of the head and coronal reformations without  IV contrast material. Radiation dose for this scan was reduced using  automated exposure control, adjustment of the mA and/or kV according  to patient size, or iterative reconstruction technique.    COMPARISON: Head CT 8/20/2016.    FINDINGS: There is no evidence of intracranial hemorrhage, mass, acute  infarct or anomaly. The ventricles are normal in size, shape and  configuration. The brain parenchyma and subarachnoid spaces are  normal.     The visualized portions of the sinuses and mastoids appear normal. The  bony calvarium and bones of the skull base appear intact.       Impression    IMPRESSION:   No evidence of acute intracranial hemorrhage, mass, or  herniation.    ASHLEY TIRADO MD         SYSTEM ID:  [id number]   CT Chest (PE) Abdomen  Pelvis w Contrast    Narrative    CT CHEST PE ABDOMEN PELVIS WITH CONTRAST 9/29/2021 2:24 PM    CLINICAL HISTORY: Chest Pain. Sepsis, apnea of unclear etiology,  altered mental status.    TECHNIQUE: CT scan of the chest, abdomen, and pelvis was performed  following injection of IV contrast. Multiplanar reformats were  obtained. Dose reduction techniques were used.   CONTRAST: 70mL Isovue-370    COMPARISON: None.    FINDINGS:   ANGIOGRAM CHEST: Pulmonary arteries are normal caliber and negative  for pulmonary emboli. Thoracic aorta is negative for dissection. No CT  evidence of right heart strain.    LUNGS AND PLEURA: Minimal dependent probable atelectasis vs. less  likely infiltrate. No effusions.    MEDIASTINUM/AXILLAE: No adenopathy or aneurysm.    HEPATOBILIARY: No significant mass or bile duct dilatation. No  calcified gallstones.     PANCREAS: No significant mass, duct dilatation, or inflammatory  change.    SPLEEN: Normal size.    ADRENAL GLANDS: No significant nodules.    KIDNEYS/BLADDER: No significant mass, stones, or hydronephrosis.    BOWEL: No obstruction or inflammatory change. Enteric tube tip in the  stomach.    PELVIC ORGANS: Small cyst or follicle in the right ovary. Pelvic  structures otherwise unremarkable.    ADDITIONAL FINDINGS: Trace free fluid. No free air.    MUSCULOSKELETAL: Survey of the visualized bony structures demonstrates  no destructive bony lesions.      Impression    IMPRESSION:  1. No pulmonary embolism demonstrated.  2. Minimal probable dependent atelectasis vs. less likely infiltrate  in the lungs.  3. No acute process demonstrated in the abdomen and pelvis.    MACHO LARSEN MD         SYSTEM ID:  [id number]   POC US ECHO LIMITED    Narrative    PROCEDURE: Point of care bedside Cardiac US  PERFORMED BY: Dr. Marcelo Wilson  INDICATIONS/SYMPTOM: Unresponsive, apnea.    PROBE: Phased array cardiac transducer.   BODY LOCATION: The US was performed in the sub-xiphoid location and/or  parasternal views.  FINDINGS: No evidence of pericardial effusion.  INTERPRETATION: There was no pericardial effusion.    IMAGE DOCUMENTATION: Images were archived to the US hard drive, and archived to PACS system.     CT Cervical Spine w/o Contrast    Narrative    CT CERVICAL SPINE WITHOUT CONTRAST 9/29/2021 2:24 PM     HISTORY: Neck trauma, altered mental status.     TECHNIQUE: Axial images of the cervical spine were obtained without  intravenous contrast. Multiplanar reformations were performed.   Radiation dose for this scan was reduced using automated exposure  control, adjustment of the mA and/or kV according to patient size, or  iterative reconstruction technique.    COMPARISON: None.    FINDINGS: Straightening of the normal cervical lordosis. No acute  fracture line visualized. No loss of vertebral body height. Lucent 0.6  cm lesion within the C3 vertebral body that appears to have some  internal fat density. This probably represents a venous vascular  malformation (previously turned hemangioma).    Moderate degenerative endplate changes and loss of disc height at  C5-C6 and C6-C7. Mild degenerative endplate changes and loss of disc  height at C4-C5. No significant spinal canal or neural foraminal  narrowing at any level.    ET tube and enteric tube partially visualized.      Impression    IMPRESSION:   1. No evidence of acute fracture or subluxation in the cervical spine.  2. Degenerative changes in the cervical spine without significant  spinal canal or neural foraminal narrowing.     ASHLEY TIRADO MD         SYSTEM ID:  [id number]   XR Blood Patch Procedure    Narrative    XR BLOOD PATCH PROCEDURE                 10/1/2021 3:23 PM       History:  Post lumbar puncture headache.     PROCEDURE:  The procedure, indications, risks (including the risk of  infection, bleeding and reaction to contrast material and  medications), and alternatives to therapy were discussed with the  patient and informed consent was  obtained for the procedure with the  assistance of her sister in law on the phone for interpreting.  The  lower back was prepped and draped in the usual fashion.  Lidocaine 1%  was used for local anesthesia.  Using fluoroscopic guidance, a  3.5  inch 22 gauge spinal needle was advanced into the epidural space via  interlaminar approach at the L4-5 level. This matches the previous  skin puncture site and level of LP per procedure dictation from the  ER.  A small amount of contrast was injected confirming epidural  location of the needle.  Subsequently, 20 mL of the patient's blood  was injected.  The needle was removed.  The patient tolerated the  procedure well and there were no immediate complications.        Fluoroscopy time: 0.3 minutes  Number of Images: 4    Meds given: 3 mL 1% lidocaine, a 3 mL Isovue-M 200      Impression    IMPRESSION:  Technically successful epidural blood patch injection at   L4-5    ABDIRIZAK SCOTT PA-C         SYSTEM ID:  [id number]   MRV Brain wo & w Contrast    Narrative    EXAM: MRV BRAIN  WITHOUT AND WITH CONTRAST  LOCATION: [facility]  DATE/TIME: 10/02/2021, 9:01 AM    INDICATION: Cerebrospinal fluid leak.  COMPARISON: None.  CONTRAST: 5.5 mL Gadavist.  TECHNIQUE: MR venography was performed through the dural venous sinuses without and with contrast.     FINDINGS:    HEAD MRV:   The internal cerebral veins, vein of Twin, straight sinus, superior sagittal sinuses, transverse sinuses, sigmoid sinuses, and proximal jugular veins are patent. The right transverse sinus is dominant. There is a very small left transverse sinus with a   medium-sized left sigmoid sinus. There is no evidence for any acute dural venous thrombosis or any significant stenosis.      Impression    IMPRESSION:    HEAD MRV:   1.  No dural venous sinus thrombosis or significant stenosis.     Echocardiogram Complete     Value    LVEF  55-60%    Narrative     [id number]  [id number]  [id number]  [id number]^PRATIBHA^JOSSELYN^LUPE     [facility]  Echocardiography Laboratory  [address] [place] [address]     Name: ZORAIDA PENDLETON  MRN: [id number]  : 1985  Study Date: 2021 08:15 AM  Age: 36 yrs  Gender: Female  Patient Location: [facility]  Reason For Study: Arrhythmia  Ordering Physician: JOSSELYN MCKINNON  Referring Physician: Adelina Aguilera MD  Performed By: Bernice Becerra [facility]     BSA: 1.5 m2  Height: 62 in  Weight: 117 lb  HR: 87  BP: 114/66 mmHg  ______________________________________________________________________________  Procedure  Complete Portable Echo Adult. Optison (NDC #7116-2530) given intravenously.  ______________________________________________________________________________  Interpretation Summary     Essentially normal adult cardiac echo  The study was technically difficult. Contrast was used without apparent  complications.  ______________________________________________________________________________  Left Ventricle  The left ventricle is normal in size. There is normal left ventricular wall  thickness. The visual ejection fraction is 55-60%. Left ventricular diastolic  function is normal. Diastolic Doppler findings (E/E' ratio and/or other  parameters) suggest left ventricular filling pressures are normal. No regional  wall motion abnormalities noted.     Right Ventricle  The right ventricle is normal in structure, function and size.     Atria  Normal left atrial size. Right atrial size is normal. There is no atrial shunt  seen.     Mitral Valve  The mitral valve leaflets appear normal. There is no evidence of stenosis,  fluttering, or prolapse. There is trace to mild mitral regurgitation.     Tricuspid Valve  Normal tricuspid valve. There is physiologic tricuspid regurgitation. Unable  to assess mean RA pressure given the patient is on a ventilator. Normal size  IVC 1.5 cm.     Aortic Valve  The aortic  valve is not well visualized. No aortic regurgitation is present.  No aortic stenosis is present.     Pulmonic Valve  The pulmonic valve is not well seen, but is grossly normal.     Vessels  Normal size aorta.     Pericardium  There is no pericardial effusion.     Rhythm  Sinus rhythm was noted.  ______________________________________________________________________________  MMode/2D Measurements & Calculations  IVSd: 0.63 cm     LVIDd: 4.4 cm  LVIDs: 2.3 cm  LVPWd: 0.74 cm  FS: 47.0 %  LV mass(C)d: 88.4 grams  LV mass(C)dI: 58.1 grams/m2  Ao root diam: 2.7 cm  LA dimension: 3.0 cm  LA/Ao: 1.1  LVOT diam: 1.9 cm  LVOT area: 2.8 cm2  LA Volume (BP): 24.0 ml  LA Volume Index (BP): 15.8 ml/m2  RWT: 0.34     Doppler Measurements & Calculations  MV E max bianca: 94.1 cm/sec  MV A max bianca: 70.6 cm/sec  MV E/A: 1.3  MV dec time: 0.12 sec  Ao V2 max: 130.8 cm/sec  Ao max P.0 mmHg  PA acc time: 0.15 sec  E/E' av.5  Lateral E/e': 5.6  Medial E/e': 7.4     ______________________________________________________________________________  Report approved by: Santa Potts 2021 10:13 AM                 Most Recent Lab Results In EPIC (For Non-EPIC Providers):    Most Recent 3 CBC's:  Recent Labs   Lab Test 10/02/21  0739 21  0604 21  1335   WBC 8.2 11.1* 16.4*   HGB 10.8* 9.0* 11.4*   MCV 73* 72* 72*    195 390      Most Recent 3 BMP's:  Recent Labs   Lab Test 10/04/21  1009 10/02/21  0739 10/01/21  1605 10/01/21  1227 10/01/21  0746 10/01/21  0523 21  0758 21  0604 21  1933 21  1335   NA  --  140  --   --   --   --   --  141  --  139   POTASSIUM 4.0 4.0  --   --   --  3.7   < > 4.2   < > 2.8*   CHLORIDE  --  111*  --   --   --   --   --  114*  --  107   CO2  --  21  --   --   --   --   --  20  --  23   BUN  --  9  --   --   --   --   --  8  --  13   CR  --  0.48*  --   --   --   --   --  0.55  --  0.70   ANIONGAP  --  8  --   --   --   --   --  7  --  9   SONDRA  --  8.7   --   --   --   --   --  7.3*  --  8.9   GLC  --  85 86 104*   < >  --    < > 144*   < > 138*    < > = values in this interval not displayed.     Most Recent 3 Troponin's:No lab results found.  Most Recent 3 INR's:No lab results found.  Most Recent 2 LFT's:  Recent Labs   Lab Test 09/30/21  0604 09/29/21  1335   AST 30 21   ALT 26 29   ALKPHOS 57 71   BILITOTAL 0.3 0.3     Most Recent Cholesterol Panel:No lab results found.  Most Recent 6 Bacteria Isolates From Any Culture (See EPIC Reports for Culture Details):  Recent Labs   Lab Test 02/14/21  2203 02/14/21  2159   CULT No growth No growth     Most Recent TSH, T4 and HgbA1c:  Recent Labs   Lab Test 02/15/21  0606   TSH 0.29*   T4 1.04

## 2021-10-05 NOTE — PLAN OF CARE
End of Shift Summary  For vital signs and complete assessments, please see documentation flowsheets.     Pertinent assessments: A&Ox4. Up SBA. No blurry vision reported. Regular diet after blood patch. Pt reports no headache pain since blood patch.     Major Shift Events: uneventful    Treatment Plan: Monitor for neuro deficits. Continue to treat pain as needed, plan for additional blood patch Monday.     Bedside Nurse: Nelsy Terry RN

## 2021-10-07 ENCOUNTER — HOSPITAL ENCOUNTER (EMERGENCY)
Facility: CLINIC | Age: 36
Discharge: HOME | End: 2021-10-08
Attending: EMERGENCY MEDICINE | Admitting: EMERGENCY MEDICINE
Payer: COMMERCIAL

## 2021-10-07 DIAGNOSIS — L50.9 URTICARIA: ICD-10-CM

## 2021-10-07 DIAGNOSIS — R06.00 DYSPNEA, UNSPECIFIED TYPE: ICD-10-CM

## 2021-10-07 LAB
ANION GAP SERPL CALCULATED.3IONS-SCNC: 5 MMOL/L (ref 3–14)
BASOPHILS # BLD AUTO: 0 10E3/UL (ref 0–0.2)
BASOPHILS NFR BLD AUTO: 1 %
BUN SERPL-MCNC: 11 MG/DL (ref 7–30)
CALCIUM SERPL-MCNC: 9.4 MG/DL (ref 8.5–10.1)
CHLORIDE BLD-SCNC: 107 MMOL/L (ref 94–109)
CO2 SERPL-SCNC: 25 MMOL/L (ref 20–32)
CREAT SERPL-MCNC: 0.89 MG/DL (ref 0.52–1.04)
EOSINOPHIL # BLD AUTO: 0.1 10E3/UL (ref 0–0.7)
EOSINOPHIL NFR BLD AUTO: 1 %
ERYTHROCYTE [DISTWIDTH] IN BLOOD BY AUTOMATED COUNT: 17.4 % (ref 10–15)
GFR SERPL CREATININE-BSD FRML MDRD: 83 ML/MIN/1.73M2
GLUCOSE BLD-MCNC: 152 MG/DL (ref 70–99)
GLUCOSE BLDC GLUCOMTR-MCNC: 144 MG/DL (ref 70–99)
HCG SERPL QL: NEGATIVE
HCT VFR BLD AUTO: 34.9 % (ref 35–47)
HGB BLD-MCNC: 10.4 G/DL (ref 11.7–15.7)
IMM GRANULOCYTES # BLD: 0.1 10E3/UL
IMM GRANULOCYTES NFR BLD: 1 %
LYMPHOCYTES # BLD AUTO: 2.1 10E3/UL (ref 0.8–5.3)
LYMPHOCYTES NFR BLD AUTO: 30 %
MCH RBC QN AUTO: 22 PG (ref 26.5–33)
MCHC RBC AUTO-ENTMCNC: 29.8 G/DL (ref 31.5–36.5)
MCV RBC AUTO: 74 FL (ref 78–100)
MONOCYTES # BLD AUTO: 0.5 10E3/UL (ref 0–1.3)
MONOCYTES NFR BLD AUTO: 7 %
NEUTROPHILS # BLD AUTO: 4.4 10E3/UL (ref 1.6–8.3)
NEUTROPHILS NFR BLD AUTO: 60 %
NRBC # BLD AUTO: 0 10E3/UL
NRBC BLD AUTO-RTO: 0 /100
PLATELET # BLD AUTO: 238 10E3/UL (ref 150–450)
POTASSIUM BLD-SCNC: 3.3 MMOL/L (ref 3.4–5.3)
RBC # BLD AUTO: 4.73 10E6/UL (ref 3.8–5.2)
SODIUM SERPL-SCNC: 137 MMOL/L (ref 133–144)
WBC # BLD AUTO: 7.2 10E3/UL (ref 4–11)

## 2021-10-07 PROCEDURE — 80048 BASIC METABOLIC PNL TOTAL CA: CPT | Performed by: EMERGENCY MEDICINE

## 2021-10-07 PROCEDURE — 85025 COMPLETE CBC W/AUTO DIFF WBC: CPT | Performed by: EMERGENCY MEDICINE

## 2021-10-07 PROCEDURE — 36415 COLL VENOUS BLD VENIPUNCTURE: CPT | Performed by: EMERGENCY MEDICINE

## 2021-10-07 PROCEDURE — 96374 THER/PROPH/DIAG INJ IV PUSH: CPT

## 2021-10-07 PROCEDURE — 96375 TX/PRO/DX INJ NEW DRUG ADDON: CPT

## 2021-10-07 PROCEDURE — 93005 ELECTROCARDIOGRAM TRACING: CPT

## 2021-10-07 PROCEDURE — 250N000011 HC RX IP 250 OP 636: Performed by: EMERGENCY MEDICINE

## 2021-10-07 PROCEDURE — 99284 EMERGENCY DEPT VISIT MOD MDM: CPT | Mod: 25

## 2021-10-07 PROCEDURE — 84703 CHORIONIC GONADOTROPIN ASSAY: CPT | Performed by: EMERGENCY MEDICINE

## 2021-10-07 RX ORDER — METHYLPREDNISOLONE SODIUM SUCCINATE 125 MG/2ML
125 INJECTION, POWDER, LYOPHILIZED, FOR SOLUTION INTRAMUSCULAR; INTRAVENOUS ONCE
Status: COMPLETED | OUTPATIENT
Start: 2021-10-07 | End: 2021-10-07

## 2021-10-07 RX ORDER — DIPHENHYDRAMINE HYDROCHLORIDE 50 MG/ML
25 INJECTION INTRAMUSCULAR; INTRAVENOUS ONCE
Status: COMPLETED | OUTPATIENT
Start: 2021-10-07 | End: 2021-10-07

## 2021-10-07 RX ADMIN — DIPHENHYDRAMINE HYDROCHLORIDE 25 MG: 50 INJECTION INTRAMUSCULAR; INTRAVENOUS at 23:08

## 2021-10-07 RX ADMIN — METHYLPREDNISOLONE SODIUM SUCCINATE 125 MG: 125 INJECTION, POWDER, FOR SOLUTION INTRAMUSCULAR; INTRAVENOUS at 23:04

## 2021-10-07 ASSESSMENT — ENCOUNTER SYMPTOMS
ROS SKIN COMMENTS: GENERALIZED ITCHING
SHORTNESS OF BREATH: 1

## 2021-10-08 VITALS
DIASTOLIC BLOOD PRESSURE: 71 MMHG | HEART RATE: 73 BPM | SYSTOLIC BLOOD PRESSURE: 119 MMHG | RESPIRATION RATE: 18 BRPM | TEMPERATURE: 98.6 F | OXYGEN SATURATION: 100 %

## 2021-10-08 LAB
ATRIAL RATE - MUSE: 64 BPM
DIASTOLIC BLOOD PRESSURE - MUSE: NORMAL MMHG
INTERPRETATION ECG - MUSE: NORMAL
P AXIS - MUSE: 28 DEGREES
PR INTERVAL - MUSE: 148 MS
QRS DURATION - MUSE: 82 MS
QT - MUSE: 406 MS
QTC - MUSE: 418 MS
R AXIS - MUSE: 74 DEGREES
SYSTOLIC BLOOD PRESSURE - MUSE: NORMAL MMHG
T AXIS - MUSE: 60 DEGREES
VENTRICULAR RATE- MUSE: 64 BPM

## 2021-10-08 RX ORDER — FAMOTIDINE 20 MG/1
20 TABLET, FILM COATED ORAL 2 TIMES DAILY
Qty: 20 TABLET | Refills: 0 | Status: SHIPPED | OUTPATIENT
Start: 2021-10-08 | End: 2022-04-05

## 2021-10-08 RX ORDER — DIPHENHYDRAMINE HCL 25 MG
25 CAPSULE ORAL EVERY 6 HOURS PRN
Qty: 20 CAPSULE | Refills: 0 | Status: SHIPPED | OUTPATIENT
Start: 2021-10-08

## 2021-10-08 RX ORDER — PREDNISONE 20 MG/1
TABLET ORAL
Qty: 10 TABLET | Refills: 0 | Status: SHIPPED | OUTPATIENT
Start: 2021-10-08 | End: 2022-04-05

## 2021-10-08 NOTE — ED PROVIDER NOTES
"  History   Chief Complaint:  Shortness of Breath    The history is provided by a relative and the patient. A  was used.      Lucho Lugo is a 36 year old female with history of diabetes who presents with shortness of breath. A week ago, the patient experienced anaphylaxis following a flu shot. She presented to the ED, was intubated and then admitted to the ICU. Two days ago, she was discharged to home. Today she was feeling at her baseline, but tonight she developed shortness of breath and full body itching. She states these symptoms feel similar to her symptoms prior to anaphylaxis. Additionally, she notes both of her ears are \"burning\". Of note, she has not recently started any new medications or use any new soaps, shampoos or detergents.     Review of Systems   HENT: Positive for ear pain (bilateral).    Respiratory: Positive for shortness of breath.    Skin:        Generalized itching   All other systems reviewed and are negative.    Allergies:  Pineapple  Orange Fruit   Amoxicillin    Medications:  Epinephrine  Ferrous sulfate  Singulair    Past Medical History:    Diabetes  Gestational thrombocytopenia  Obtunded  Anaphylaxis    Past Surgical History:    C section x2  Cholecystectomy    Social History:  The patient was accompanied to the ED by her  and brother.    Physical Exam     Patient Vitals for the past 24 hrs:   BP Temp Temp src Pulse Resp SpO2   10/08/21 0000 119/71 -- -- 73 -- 100 %   10/07/21 2330 132/71 -- -- 75 -- 100 %   10/07/21 2300 129/76 -- -- 67 -- 100 %   10/07/21 2249 -- -- -- -- 18 --   10/07/21 2245 (!) 144/83 98.6  F (37  C) Oral 74 16 98 %       Physical Exam  Vitals reviewed.   HENT:      Head: Normocephalic.      Right Ear: Tympanic membrane normal.      Left Ear: Tympanic membrane normal.      Nose: Nose normal.      Mouth/Throat:      Mouth: Mucous membranes are dry.   Eyes:      Extraocular Movements: Extraocular movements intact.      Pupils: " Pupils are equal, round, and reactive to light.   Cardiovascular:      Rate and Rhythm: Regular rhythm.   Pulmonary:      Effort: Pulmonary effort is normal.      Breath sounds: Normal breath sounds.   Abdominal:      General: Abdomen is flat.      Palpations: Abdomen is soft.   Musculoskeletal:         General: Normal range of motion.   Skin:     General: Skin is warm.      Capillary Refill: Capillary refill takes less than 2 seconds.   Neurological:      General: No focal deficit present.      Mental Status: She is alert.   Psychiatric:         Mood and Affect: Mood normal.           Emergency Department Course   ECG:  ECG taken at 2257  Normal sinus rhythm  Normal ECG  Rate 64 bpm. OK interval 148 ms. QRS duration 82 ms. QT/QTc 406/418 ms. P-R-T axes 28 74 60.    Laboratory:  CBC: WBC 7.2, HGB 10.4 (L),   BMP: Potassium 3.3 (L), Glucose 152 (H) o/w WNL (Creatinine 0.89)    HCG Qualitative Urine: Negative    Glucose by meter (Collected 2257): 144 (H)     Emergency Department Course:  Reviewed:  I reviewed nursing notes, vitals, past medical history and care everywhere    Assessments:  2249 I obtained history and examined the patient as noted above.     2314 I rechecked and updated the patient.    0020 I rechecked and updated the patient regarding the laboratory results and the plan for care.    Interventions:  2304 Solu-Medrol 125mg IV  2308 Benadryl 25mg IV    Disposition:  The patient was discharged to home.     Impression & Plan   Medical Decision Making:  Patient presents with a itching around the scalp and concerns for respiratory issues.  In review of patient's chart patient is quite confusing as patient's been admitted on at least 2 occasions 1 of which there was significant oropharyngeal swelling for which she was intubated in the field.  Patient was intubated and sent to an intensive care unit.  Cause of her symptoms were unclear and patient was discharged.  Patient was reintubated for  "unresponsiveness just a few weeks ago through this emergency department had extensive work-up including LP CT MRV all of which which are normal.  Patient is brought back tonight with complaints that her ears are burning and that she is felt like she was itching and like she was going to have an allergic reaction the patient states that she seen an allergist and has no document allergies to anything.   states she is \"allergic to pineapple\".  On arrival there is no signs of histamine relief there is no swelling there is no erythema patient states she is itching to the scalp but I do not identify any abnormality.  Patient feigned unresponsiveness for a while and came to.  Blood pressure and heart rate were normal to to be normal.  Clinically I am suspicious of some type of conversion disorder.  Ultimately patient was observed in the emergency room and seemed to return to baseline.  Cannot rule out absence seizure's or other causes of unresponsiveness.  However patient is advised to follow-up with neurology.  Patient was observed in the emergency for 2 hours patient returned to baseline without signs of histamine release or swelling or altered mental status and was discharged with  in stable condition.    Diagnosis:    ICD-10-CM    1. Dyspnea, unspecified type  R06.00    2. Urticaria  L50.9        Discharge Medications:  New Prescriptions    DIPHENHYDRAMINE (BENADRYL) 25 MG CAPSULE    Take 1 capsule (25 mg) by mouth every 6 hours as needed for itching or allergies    FAMOTIDINE (PEPCID) 20 MG TABLET    Take 1 tablet (20 mg) by mouth 2 times daily    PREDNISONE (DELTASONE) 20 MG TABLET    Take two tablets (= 40mg) each day for 5 (five) days       Scribe Disclosure:  I, dJ Joshi, am serving as a scribe at 10:52 PM on 10/7/2021 to document services personally performed by Arjun Soares MD based on my observations and the provider's statements to me.      Arjun Soares MD  10/08/21 " [id number]

## 2021-10-08 NOTE — DISCHARGE INSTRUCTIONS
We have observed you in the emergency room and found no signs of tongue or lip swelling no signs of histamine release.  The cause of your symptoms are unclear we understand that you have had episodes where you have had difficulty breathing and have been intubated but there is no signs or need for that tonight.  Use Benadryl every 4 hours while awake.  Continue steroids for 3 days due to history of allergy.  Please follow-up with your primary care physician and allergist for further assessment.  Discussed with your neurologist the possibility of absence seizures.  Discussed with your primary care to the doctor the possibility of anxiety or conversion disorder.

## 2021-10-08 NOTE — ED TRIAGE NOTES
Pt recently was intubated for anaphylaxis, discharged from hospital 2 days ago.  Tonight had an onset of shortness of breath and body itching.  She states she felt like this last time too.

## 2021-12-04 NOTE — ED PROVIDER NOTES
History     Chief Complaint:  Foot Pain    HPI   Lucho Lugo is a 31 year old female who presents with foot pain. The patient reports that her child had to vomit, so got up to take them to bathroom when she stepped wrong and developed pain to her left mid foot. Because of the pain, she presented to the ED for evaluation. While in the ED, sh reports left foot pain but states that she is able to walk and stand on it. She denies other injury or taking anything for the pain. No other concerns or complaints at this time.       Allergies:  Amoxicillin      Medications:    The patient is currently on no regular medications.      Past Medical History:    Gestational diabetes mellitus  Gestational thrombocytopenia     Past Surgical History:     section  Cholecystectomy     Family History:  History reviewed.  No significant family history.     Social History:  Relationship status:   The patient has never smoked.   The patient does not drink alcohol.        Review of Systems   Musculoskeletal:        Positive for left foot pain.    All other systems reviewed and are negative.      Physical Exam   First Vitals:  BP: 138/72  Pulse: 75  Heart Rate: 75  Temp: 98  F (36.7  C)  Resp: 18  SpO2: 99 %      Physical Exam  Constitutional: The patient is oriented to person, place, and time. Alert and cooperative.  HENT:   Right Ear: External ear normal.   Left Ear: External ear normal.   Nose: Nose normal.   Mouth/Throat: Moist mucous membranes.   Eyes: Conjunctivae, EOM and lids are normal.   Neck: Trachea normal. Normal range of motion. Neck supple.   Cardiovascular: RRR    Pulmonary/Chest: Effort normal   Musculoskeletal: LLE: no obvious deformity, skin intact, no erythema, no significant edema. Tenderness to palpation over the mid foot. Non-tender to palpation over the greater troch, femur, knee, lower leg, ankle. Mildly decreased ROM at the foot secondary to pain. Normal ROM at the hip, knee, ankle.  Sensation intact to light touch throughout. 2+ DP and PT pulse.  Neurological: Alert and Oriented. Moves all extremities equally.  Skin: Skin is dry. No rash noted.          Emergency Department Course     Imaging:  Radiographic findings were communicated with the patient who voiced understanding of the findings.    Left Foot XR per radiology:   IMPRESSION: No acute fracture or dislocation.    Interventions:  2230: Advil, 600 mg, PO    ED Course:  The patient arrived in triage where her vitals were measured and recorded. The patient was then escorted back to the emergency department.  Nursing notes and past medical history reviewed.   I performed a physical examination of the patient as documented above.  I explained the plan with the patient who consents to this.   The patient underwent the above imaging studies.   The patient received the above interventions.   Patient was placed in an air splint.  I personally reviewed the imaging results with the Patient and answered all related questions prior to discharge.   Findings and plan explained to the Patient. Patient discharged home with instructions regarding supportive care, medications, and reasons to return. The importance of close follow-up was reviewed.     Impression & Plan      Medical Decision Making:  Lucho Lugo is a 31 year old female who presents to the emergency deparment for evaluation of left foot pain.  Upon presentation in the ED, the patient is non-toxic appearing and vitals are within normal limits and stable. On exam, she is well-appearing. On exam of the left lower extremity, there is no obvious deformity and skin is intact. I do no appreciate any erythema or any significant edema. She does endorse tenderness to palpation over the the mid foot. She has no tenderness to palpation over the ankle or malleleoli. She is neurovascularly intact. The rest of her exam is as mentioned above. X-ray of the left foot was obtained and demonstrates  no evidence of an acute bony abnormality. These results were discussed with the patient and she notes understanding. Given the unremarkable imaging, I have low suspicion for any acute bony injury. The patient has no tenderness to palpation over the ankle or malleoli to suggest an ankle injury or warrant any imaging of the ankle at this time. Overall, I am suspicious that her symptoms are secondary to a foot sprain. Given that she is well-appearing and with unremarkable imaging, I do feel that she can be discharged to home. She was provided an air splint to use and needed for comfort. I did discuss that I recommend close follow up with a primary care physician, and she notes understanding and agreement. Return instructions were given. She was stable/improved at the time of discharge.     Diagnosis:    ICD-10-CM    1. Foot sprain, left, initial encounter S93.602A      Disposition:   Discharge to home with primary care follow up.       Savanna ARMSTRONG, am serving as a scribe on 5/20/2017 at 12:24 AM to personally document services performed by Rose Blair MD, based on my observations and the provider's statements to me.       Rose Blair MD  05/21/17 0110     No

## 2022-01-13 ENCOUNTER — HOSPITAL ENCOUNTER (EMERGENCY)
Facility: CLINIC | Age: 37
Discharge: HOME | End: 2022-01-13
Admitting: EMERGENCY MEDICINE
Payer: COMMERCIAL

## 2022-01-13 VITALS
TEMPERATURE: 97.8 F | RESPIRATION RATE: 20 BRPM | SYSTOLIC BLOOD PRESSURE: 117 MMHG | HEART RATE: 80 BPM | OXYGEN SATURATION: 100 % | DIASTOLIC BLOOD PRESSURE: 70 MMHG

## 2022-01-13 LAB
ALBUMIN SERPL-MCNC: 3.7 G/DL (ref 3.4–5)
ALBUMIN UR-MCNC: NEGATIVE MG/DL
ALP SERPL-CCNC: 71 U/L (ref 40–150)
ALT SERPL W P-5'-P-CCNC: 20 U/L (ref 0–50)
ANION GAP SERPL CALCULATED.3IONS-SCNC: 3 MMOL/L (ref 3–14)
APPEARANCE UR: CLEAR
AST SERPL W P-5'-P-CCNC: 14 U/L (ref 0–45)
BILIRUB SERPL-MCNC: 0.3 MG/DL (ref 0.2–1.3)
BILIRUB UR QL STRIP: NEGATIVE
BUN SERPL-MCNC: 8 MG/DL (ref 7–30)
CALCIUM SERPL-MCNC: 8.5 MG/DL (ref 8.5–10.1)
CHLORIDE BLD-SCNC: 109 MMOL/L (ref 94–109)
CO2 SERPL-SCNC: 26 MMOL/L (ref 20–32)
COLOR UR AUTO: ABNORMAL
CREAT SERPL-MCNC: 0.57 MG/DL (ref 0.52–1.04)
ERYTHROCYTE [DISTWIDTH] IN BLOOD BY AUTOMATED COUNT: 16.8 % (ref 10–15)
GFR SERPL CREATININE-BSD FRML MDRD: >90 ML/MIN/1.73M2
GLUCOSE BLD-MCNC: 106 MG/DL (ref 70–99)
GLUCOSE UR STRIP-MCNC: NEGATIVE MG/DL
HCG UR QL: NEGATIVE
HCT VFR BLD AUTO: 38.3 % (ref 35–47)
HGB BLD-MCNC: 10.6 G/DL (ref 11.7–15.7)
HGB UR QL STRIP: NEGATIVE
KETONES UR STRIP-MCNC: NEGATIVE MG/DL
LEUKOCYTE ESTERASE UR QL STRIP: NEGATIVE
MCH RBC QN AUTO: 20.6 PG (ref 26.5–33)
MCHC RBC AUTO-ENTMCNC: 27.7 G/DL (ref 31.5–36.5)
MCV RBC AUTO: 74 FL (ref 78–100)
MUCOUS THREADS #/AREA URNS LPF: PRESENT /LPF
NITRATE UR QL: NEGATIVE
PH UR STRIP: 6 [PH] (ref 5–7)
PLATELET # BLD AUTO: 177 10E3/UL (ref 150–450)
POTASSIUM BLD-SCNC: 3.5 MMOL/L (ref 3.4–5.3)
PROT SERPL-MCNC: 7.8 G/DL (ref 6.8–8.8)
RBC # BLD AUTO: 5.15 10E6/UL (ref 3.8–5.2)
RBC URINE: <1 /HPF
SODIUM SERPL-SCNC: 138 MMOL/L (ref 133–144)
SP GR UR STRIP: 1.02 (ref 1–1.03)
SQUAMOUS EPITHELIAL: 2 /HPF
UROBILINOGEN UR STRIP-MCNC: NORMAL MG/DL
WBC # BLD AUTO: 5 10E3/UL (ref 4–11)
WBC URINE: 1 /HPF

## 2022-01-13 PROCEDURE — 258N000003 HC RX IP 258 OP 636: Performed by: EMERGENCY MEDICINE

## 2022-01-13 PROCEDURE — 81025 URINE PREGNANCY TEST: CPT | Performed by: EMERGENCY MEDICINE

## 2022-01-13 PROCEDURE — 96374 THER/PROPH/DIAG INJ IV PUSH: CPT

## 2022-01-13 PROCEDURE — 85027 COMPLETE CBC AUTOMATED: CPT | Performed by: EMERGENCY MEDICINE

## 2022-01-13 PROCEDURE — 80053 COMPREHEN METABOLIC PANEL: CPT | Performed by: EMERGENCY MEDICINE

## 2022-01-13 PROCEDURE — 250N000011 HC RX IP 250 OP 636: Performed by: EMERGENCY MEDICINE

## 2022-01-13 PROCEDURE — 81003 URINALYSIS AUTO W/O SCOPE: CPT | Performed by: EMERGENCY MEDICINE

## 2022-01-13 PROCEDURE — 999N000104 HC STATISTIC NO CHARGE

## 2022-01-13 PROCEDURE — 36415 COLL VENOUS BLD VENIPUNCTURE: CPT | Performed by: EMERGENCY MEDICINE

## 2022-01-13 RX ORDER — ONDANSETRON 2 MG/ML
4 INJECTION INTRAMUSCULAR; INTRAVENOUS ONCE
Status: COMPLETED | OUTPATIENT
Start: 2022-01-13 | End: 2022-01-13

## 2022-01-13 RX ORDER — ONDANSETRON 4 MG/1
4 TABLET, ORALLY DISINTEGRATING ORAL ONCE
Status: DISCONTINUED | OUTPATIENT
Start: 2022-01-13 | End: 2022-01-13 | Stop reason: HOSPADM

## 2022-01-13 RX ADMIN — ONDANSETRON 4 MG: 2 INJECTION INTRAMUSCULAR; INTRAVENOUS at 17:22

## 2022-01-13 RX ADMIN — SODIUM CHLORIDE 1000 ML: 9 INJECTION, SOLUTION INTRAVENOUS at 17:21

## 2022-01-13 NOTE — ED TRIAGE NOTES
Pt presents after being seen at clinic for abdominal pain and N//V/D for 24 hours. Clinic unable to obtain Iv access and believes pt is dehydrated.

## 2022-01-13 NOTE — LETTER
January 13, 2022      To Whom It May Concern:      Lucho JEAN Lugo was seen in our Emergency Department today, 01/13/22.    Sincerely,        Balbir OROZCO RN

## 2022-01-23 ENCOUNTER — HEALTH MAINTENANCE LETTER (OUTPATIENT)
Age: 37
End: 2022-01-23

## 2022-04-05 ENCOUNTER — HOSPITAL ENCOUNTER (OUTPATIENT)
Facility: CLINIC | Age: 37
Setting detail: OBSERVATION
Discharge: HOME | End: 2022-04-06
Attending: EMERGENCY MEDICINE | Admitting: INTERNAL MEDICINE
Payer: COMMERCIAL

## 2022-04-05 ENCOUNTER — APPOINTMENT (OUTPATIENT)
Dept: CT IMAGING | Facility: CLINIC | Age: 37
End: 2022-04-05
Attending: EMERGENCY MEDICINE
Payer: COMMERCIAL

## 2022-04-05 ENCOUNTER — APPOINTMENT (OUTPATIENT)
Dept: GENERAL RADIOLOGY | Facility: CLINIC | Age: 37
End: 2022-04-05
Attending: EMERGENCY MEDICINE
Payer: COMMERCIAL

## 2022-04-05 DIAGNOSIS — R40.4 UNRESPONSIVE EPISODE: ICD-10-CM

## 2022-04-05 DIAGNOSIS — E87.6 HYPOKALEMIA: ICD-10-CM

## 2022-04-05 DIAGNOSIS — T78.2XXA ANAPHYLAXIS, INITIAL ENCOUNTER: Primary | ICD-10-CM

## 2022-04-05 LAB
ABO/RH(D): NORMAL
ALBUMIN SERPL-MCNC: 3.9 G/DL (ref 3.4–5)
ALBUMIN UR-MCNC: NEGATIVE MG/DL
ALP SERPL-CCNC: 72 U/L (ref 40–150)
ALT SERPL W P-5'-P-CCNC: 15 U/L (ref 0–50)
AMPHETAMINES UR QL SCN: NORMAL
ANION GAP SERPL CALCULATED.3IONS-SCNC: 6 MMOL/L (ref 3–14)
ANTIBODY SCREEN: NEGATIVE
APAP SERPL-MCNC: <2 MG/L (ref 10–30)
APPEARANCE UR: CLEAR
AST SERPL W P-5'-P-CCNC: 13 U/L (ref 0–45)
ATRIAL RATE - MUSE: 101 BPM
BARBITURATES UR QL: NORMAL
BASOPHILS # BLD MANUAL: 0 10E3/UL (ref 0–0.2)
BASOPHILS NFR BLD MANUAL: 0 %
BENZODIAZ UR QL: NORMAL
BILIRUB SERPL-MCNC: 0.5 MG/DL (ref 0.2–1.3)
BILIRUB UR QL STRIP: NEGATIVE
BUN SERPL-MCNC: 7 MG/DL (ref 7–30)
CALCIUM SERPL-MCNC: 8.6 MG/DL (ref 8.5–10.1)
CANNABINOIDS UR QL SCN: NORMAL
CHLORIDE BLD-SCNC: 104 MMOL/L (ref 94–109)
CO2 SERPL-SCNC: 24 MMOL/L (ref 20–32)
COCAINE UR QL: NORMAL
COLOR UR AUTO: ABNORMAL
CREAT SERPL-MCNC: 0.65 MG/DL (ref 0.52–1.04)
DIASTOLIC BLOOD PRESSURE - MUSE: NORMAL MMHG
EOSINOPHIL # BLD MANUAL: 0 10E3/UL (ref 0–0.7)
EOSINOPHIL NFR BLD MANUAL: 0 %
ERYTHROCYTE [DISTWIDTH] IN BLOOD BY AUTOMATED COUNT: 18.2 % (ref 10–15)
ETHANOL SERPL-MCNC: <0.01 G/DL
GFR SERPL CREATININE-BSD FRML MDRD: >90 ML/MIN/1.73M2
GLUCOSE BLD-MCNC: 237 MG/DL (ref 70–99)
GLUCOSE BLDC GLUCOMTR-MCNC: 161 MG/DL (ref 70–99)
GLUCOSE UR STRIP-MCNC: 300 MG/DL
HCG SERPL QL: NEGATIVE
HCT VFR BLD AUTO: 37 % (ref 35–47)
HGB BLD-MCNC: 10.2 G/DL (ref 11.7–15.7)
HGB UR QL STRIP: NEGATIVE
INTERPRETATION ECG - MUSE: NORMAL
KETONES UR STRIP-MCNC: NEGATIVE MG/DL
LEUKOCYTE ESTERASE UR QL STRIP: NEGATIVE
LYMPHOCYTES # BLD MANUAL: 5.4 10E3/UL (ref 0.8–5.3)
LYMPHOCYTES NFR BLD MANUAL: 40 %
MCH RBC QN AUTO: 19.4 PG (ref 26.5–33)
MCHC RBC AUTO-ENTMCNC: 27.6 G/DL (ref 31.5–36.5)
MCV RBC AUTO: 70 FL (ref 78–100)
MONOCYTES # BLD MANUAL: 0.5 10E3/UL (ref 0–1.3)
MONOCYTES NFR BLD MANUAL: 4 %
NEUTROPHILS # BLD MANUAL: 7.5 10E3/UL (ref 1.6–8.3)
NEUTROPHILS NFR BLD MANUAL: 56 %
NITRATE UR QL: NEGATIVE
OPIATES UR QL SCN: NORMAL
P AXIS - MUSE: 71 DEGREES
PH UR STRIP: 6.5 [PH] (ref 5–7)
PLAT MORPH BLD: ABNORMAL
PLATELET # BLD AUTO: 354 10E3/UL (ref 150–450)
POTASSIUM BLD-SCNC: 2.6 MMOL/L (ref 3.4–5.3)
POTASSIUM BLD-SCNC: 4.4 MMOL/L (ref 3.4–5.3)
PR INTERVAL - MUSE: 114 MS
PROT SERPL-MCNC: 7.3 G/DL (ref 6.8–8.8)
QRS DURATION - MUSE: 84 MS
QT - MUSE: 352 MS
QTC - MUSE: 456 MS
R AXIS - MUSE: 96 DEGREES
RBC # BLD AUTO: 5.27 10E6/UL (ref 3.8–5.2)
RBC MORPH BLD: ABNORMAL
SALICYLATES SERPL-MCNC: <2 MG/DL
SARS-COV-2 RNA RESP QL NAA+PROBE: NEGATIVE
SODIUM SERPL-SCNC: 134 MMOL/L (ref 133–144)
SP GR UR STRIP: 1 (ref 1–1.03)
SPECIMEN EXPIRATION DATE: NORMAL
SYSTOLIC BLOOD PRESSURE - MUSE: NORMAL MMHG
T AXIS - MUSE: 57 DEGREES
TROPONIN I SERPL HS-MCNC: 16 NG/L
TSH SERPL DL<=0.005 MIU/L-ACNC: 0.82 MU/L (ref 0.4–4)
UROBILINOGEN UR STRIP-MCNC: NORMAL MG/DL
VARIANT LYMPHS BLD QL SMEAR: PRESENT
VENTRICULAR RATE- MUSE: 101 BPM
WBC # BLD AUTO: 13.4 10E3/UL (ref 4–11)

## 2022-04-05 PROCEDURE — 96375 TX/PRO/DX INJ NEW DRUG ADDON: CPT

## 2022-04-05 PROCEDURE — 80053 COMPREHEN METABOLIC PANEL: CPT | Performed by: EMERGENCY MEDICINE

## 2022-04-05 PROCEDURE — 250N000013 HC RX MED GY IP 250 OP 250 PS 637: Performed by: EMERGENCY MEDICINE

## 2022-04-05 PROCEDURE — 86850 RBC ANTIBODY SCREEN: CPT | Performed by: EMERGENCY MEDICINE

## 2022-04-05 PROCEDURE — 250N000011 HC RX IP 250 OP 636: Performed by: EMERGENCY MEDICINE

## 2022-04-05 PROCEDURE — 99285 EMERGENCY DEPT VISIT HI MDM: CPT | Mod: 25

## 2022-04-05 PROCEDURE — 84132 ASSAY OF SERUM POTASSIUM: CPT | Performed by: HOSPITALIST

## 2022-04-05 PROCEDURE — U0003 INFECTIOUS AGENT DETECTION BY NUCLEIC ACID (DNA OR RNA); SEVERE ACUTE RESPIRATORY SYNDROME CORONAVIRUS 2 (SARS-COV-2) (CORONAVIRUS DISEASE [COVID-19]), AMPLIFIED PROBE TECHNIQUE, MAKING USE OF HIGH THROUGHPUT TECHNOLOGIES AS DESCRIBED BY CMS-2020-01-R: HCPCS | Performed by: EMERGENCY MEDICINE

## 2022-04-05 PROCEDURE — 96366 THER/PROPH/DIAG IV INF ADDON: CPT

## 2022-04-05 PROCEDURE — 36415 COLL VENOUS BLD VENIPUNCTURE: CPT | Performed by: EMERGENCY MEDICINE

## 2022-04-05 PROCEDURE — 250N000013 HC RX MED GY IP 250 OP 250 PS 637: Performed by: STUDENT IN AN ORGANIZED HEALTH CARE EDUCATION/TRAINING PROGRAM

## 2022-04-05 PROCEDURE — G0378 HOSPITAL OBSERVATION PER HR: HCPCS

## 2022-04-05 PROCEDURE — 99220 PR INITIAL OBSERVATION CARE,LEVEL III: CPT | Performed by: HOSPITALIST

## 2022-04-05 PROCEDURE — 84703 CHORIONIC GONADOTROPIN ASSAY: CPT | Performed by: EMERGENCY MEDICINE

## 2022-04-05 PROCEDURE — 96376 TX/PRO/DX INJ SAME DRUG ADON: CPT

## 2022-04-05 PROCEDURE — C9803 HOPD COVID-19 SPEC COLLECT: HCPCS

## 2022-04-05 PROCEDURE — 250N000013 HC RX MED GY IP 250 OP 250 PS 637: Performed by: INTERNAL MEDICINE

## 2022-04-05 PROCEDURE — 80143 DRUG ASSAY ACETAMINOPHEN: CPT | Performed by: EMERGENCY MEDICINE

## 2022-04-05 PROCEDURE — 85027 COMPLETE CBC AUTOMATED: CPT | Performed by: EMERGENCY MEDICINE

## 2022-04-05 PROCEDURE — 96365 THER/PROPH/DIAG IV INF INIT: CPT

## 2022-04-05 PROCEDURE — 86901 BLOOD TYPING SEROLOGIC RH(D): CPT | Performed by: EMERGENCY MEDICINE

## 2022-04-05 PROCEDURE — 36415 COLL VENOUS BLD VENIPUNCTURE: CPT | Performed by: HOSPITALIST

## 2022-04-05 PROCEDURE — 84484 ASSAY OF TROPONIN QUANT: CPT | Performed by: EMERGENCY MEDICINE

## 2022-04-05 PROCEDURE — 84443 ASSAY THYROID STIM HORMONE: CPT | Performed by: EMERGENCY MEDICINE

## 2022-04-05 PROCEDURE — 258N000003 HC RX IP 258 OP 636: Performed by: EMERGENCY MEDICINE

## 2022-04-05 PROCEDURE — 81003 URINALYSIS AUTO W/O SCOPE: CPT | Performed by: EMERGENCY MEDICINE

## 2022-04-05 PROCEDURE — 80307 DRUG TEST PRSMV CHEM ANLYZR: CPT | Performed by: EMERGENCY MEDICINE

## 2022-04-05 PROCEDURE — 71045 X-RAY EXAM CHEST 1 VIEW: CPT

## 2022-04-05 PROCEDURE — 93005 ELECTROCARDIOGRAM TRACING: CPT

## 2022-04-05 PROCEDURE — 96361 HYDRATE IV INFUSION ADD-ON: CPT

## 2022-04-05 PROCEDURE — 80179 DRUG ASSAY SALICYLATE: CPT | Performed by: EMERGENCY MEDICINE

## 2022-04-05 PROCEDURE — 70450 CT HEAD/BRAIN W/O DYE: CPT

## 2022-04-05 PROCEDURE — 82077 ASSAY SPEC XCP UR&BREATH IA: CPT | Performed by: EMERGENCY MEDICINE

## 2022-04-05 RX ORDER — POTASSIUM CHLORIDE 7.45 MG/ML
10 INJECTION INTRAVENOUS ONCE
Status: COMPLETED | OUTPATIENT
Start: 2022-04-05 | End: 2022-04-05

## 2022-04-05 RX ORDER — NALOXONE HYDROCHLORIDE 0.4 MG/ML
0.5 INJECTION, SOLUTION INTRAMUSCULAR; INTRAVENOUS; SUBCUTANEOUS ONCE
Status: COMPLETED | OUTPATIENT
Start: 2022-04-05 | End: 2022-04-05

## 2022-04-05 RX ORDER — IBUPROFEN 400 MG/1
400 TABLET, FILM COATED ORAL
Status: COMPLETED | OUTPATIENT
Start: 2022-04-05 | End: 2022-04-05

## 2022-04-05 RX ORDER — POTASSIUM CHLORIDE 1500 MG/1
40 TABLET, EXTENDED RELEASE ORAL ONCE
Status: COMPLETED | OUTPATIENT
Start: 2022-04-05 | End: 2022-04-05

## 2022-04-05 RX ORDER — MULTIVIT WITH MINERALS/LUTEIN
1000 TABLET ORAL DAILY
COMMUNITY

## 2022-04-05 RX ORDER — ONDANSETRON 2 MG/ML
4 INJECTION INTRAMUSCULAR; INTRAVENOUS EVERY 6 HOURS PRN
Status: DISCONTINUED | OUTPATIENT
Start: 2022-04-05 | End: 2022-04-06 | Stop reason: HOSPADM

## 2022-04-05 RX ORDER — ACETAMINOPHEN 325 MG/1
975 TABLET ORAL ONCE
Status: COMPLETED | OUTPATIENT
Start: 2022-04-05 | End: 2022-04-05

## 2022-04-05 RX ORDER — ONDANSETRON 4 MG/1
4 TABLET, ORALLY DISINTEGRATING ORAL EVERY 6 HOURS PRN
Status: DISCONTINUED | OUTPATIENT
Start: 2022-04-05 | End: 2022-04-06 | Stop reason: HOSPADM

## 2022-04-05 RX ORDER — DIPHENHYDRAMINE HCL 25 MG
25 CAPSULE ORAL EVERY 6 HOURS PRN
Status: DISCONTINUED | OUTPATIENT
Start: 2022-04-05 | End: 2022-04-06 | Stop reason: HOSPADM

## 2022-04-05 RX ADMIN — POTASSIUM CHLORIDE 40 MEQ: 1500 TABLET, EXTENDED RELEASE ORAL at 11:28

## 2022-04-05 RX ADMIN — NALOXONE HYDROCHLORIDE 0.5 MG: 0.4 INJECTION, SOLUTION INTRAMUSCULAR; INTRAVENOUS; SUBCUTANEOUS at 08:52

## 2022-04-05 RX ADMIN — IBUPROFEN 400 MG: 400 TABLET ORAL at 20:04

## 2022-04-05 RX ADMIN — POTASSIUM CHLORIDE 10 MEQ: 7.46 INJECTION, SOLUTION INTRAVENOUS at 11:31

## 2022-04-05 RX ADMIN — SODIUM CHLORIDE 1000 ML: 9 INJECTION, SOLUTION INTRAVENOUS at 09:36

## 2022-04-05 RX ADMIN — NALOXONE HYDROCHLORIDE 0.5 MG: 0.4 INJECTION, SOLUTION INTRAMUSCULAR; INTRAVENOUS; SUBCUTANEOUS at 08:55

## 2022-04-05 RX ADMIN — ACETAMINOPHEN 975 MG: 325 TABLET, FILM COATED ORAL at 17:28

## 2022-04-05 ASSESSMENT — COLUMBIA-SUICIDE SEVERITY RATING SCALE - C-SSRS
1. IN THE PAST MONTH, HAVE YOU WISHED YOU WERE DEAD OR WISHED YOU COULD GO TO SLEEP AND NOT WAKE UP?: NO
6. HAVE YOU EVER DONE ANYTHING, STARTED TO DO ANYTHING, OR PREPARED TO DO ANYTHING TO END YOUR LIFE?: NO
2. HAVE YOU ACTUALLY HAD ANY THOUGHTS OF KILLING YOURSELF IN THE PAST MONTH?: NO
4. HAVE YOU HAD THESE THOUGHTS AND HAD SOME INTENTION OF ACTING ON THEM?: NO
5. HAVE YOU STARTED TO WORK OUT OR WORKED OUT THE DETAILS OF HOW TO KILL YOURSELF? DO YOU INTEND TO CARRY OUT THIS PLAN?: NO
3. HAVE YOU BEEN THINKING ABOUT HOW YOU MIGHT KILL YOURSELF?: NO

## 2022-04-05 NOTE — PROGRESS NOTES
Pt arrived from ED around 1800. A/O x4, up SBA. VSS, on RA. Denies any pain, nausea, chills, SOB, chest pain. Understands she will be here under observation for the night.

## 2022-04-05 NOTE — ED NOTES
Pt removed BP cuff before admission to floor. This RN was in with critical patient when patient left floor and was unable to go into room and reattach BP cuff.

## 2022-04-05 NOTE — ED NOTES
Bed: ED02  Expected date: 4/5/22  Expected time:   Means of arrival: Ambulance  Comments:  Christina Pablo

## 2022-04-05 NOTE — H&P
[facility]    History and Physical - Hospitalist Service       Date of Admission:  4/5/2022    Assessment & Plan      Lucho Lugo is a 36 year old female with history of severe allergies/anaphylaxis admitted on 4/5/2022 after anaphylaxis, possibly after exposure to amoxicillin.    Anaphylaxis    - patient had a possible exposure to amoxicillin (to which she has a known allergy), from her daughter    - received epinephrine, benadryl and solumedrol with EMS    - was pretty much back to baseline when arrived in the ED    - will admit for observation overnight    - uses PRN benadryl at home, will order    - PRN epi if needed    - patient has epi at home    - monitor for return of symptoms    - has seen an allergist in the past (RADHA Aranda), but likely should see again (has had some testing done which can be seen in Care Everywhere)    Hypokalemia    - replaced in the ED    - re-check    Full Code     in room. Questions answered.       Diet:  regular  DVT Prophylaxis: Low Risk/Ambulatory with no VTE prophylaxis indicated  Montero Catheter: Not present  Central Lines: None  Cardiac Monitoring: None  Code Status:   Full    Clinically Significant Risk Factors Present on Admission        # Hypokalemia: K = 2.6 mmol/L (Ref range: 3.4 - 5.3 mmol/L) on admission, will replace as needed               Disposition Plan   Expected Discharge: tomorrow to home if stable     The patient's care was discussed with the Bedside Nurse, Patient, Patient's Family and ED Provider.    Virgil Galo MD  Hospitalist Service  [facility]  Securely message with the Vocera Web Console (learn more here)  Text page via [employer] Paging/Directory         ______________________________________________________________________    Chief Complaint   anaphylaxis    History is obtained from the patient,  and EMS notes    History of Present Illness   Lucho Lugo is a 36 year  "old female with history of severe allergies/anaphylaxis admitted on 4/5/2022 after anaphylaxis, possibly after exposure to amoxicillin.  Patient woke up at around 730am in her normal state of health.  Her  states he gave their 6-year-old daughter her dose of amoxicillin sometime after 5 AM.  Apparently after waking up patient's daughter came to her room and gave her a big hug.  Immediately after this the patient started feeling like \"things were growing and getting bigger inside of me\".  She states her hands started to swell.  She called her , because this is happened before and she knew she was having allergic reaction.  She also called 911.  She states she was not unable to use her epinephrine because when this happens she is unable to get to her meds.  Her  arrived  approximately 4 minutes later.  He states that her entire face, including her nose, eyes, mouth neck and hands were swollen.  He actually has a photo on his phone, although it is fairly blurry.  She remembers everything happening around her but she was not able to talk.  According to the EMS notes she had a systolic blood pressure in the 90s on arrival.  They did note some swelling.  They also noted that she was gagging and her respiratory rate was 6.  She was given epinephrine, Benadryl and 125 mg of Solu-Medrol.  By the time she arrived to the emergency room her symptoms had pretty much resolved.  Currently the patient feels at her baseline except for a slight headache.  She denies any chest pain, shortness of breath, abdominal pain, nausea, vomiting, diarrhea.  No cough, runny nose, sore throat.  No fevers or chills.  She states that her brother and her father both have severe allergic reactions as well, both have been hospitalized.  She had an admission last fall for anaphylaxis after which she was intubated secondary to some type of herbal tea she took.  She has seen an allergist with [facility] before.  She states she " had 1 other allergic reaction in the past where she was intubated.      Review of Systems    The 10 point Review of Systems is negative other than noted in the HPI or here.     Past Medical History    I have reviewed this patient's medical history and updated it with pertinent information if needed.   Past Medical History:   Diagnosis Date     Diabetes (H)     gestational     H/O gestational diabetes mellitus, not currently pregnant     - diet     Indication for care in labor or delivery 2016       Past Surgical History   I have reviewed this patient's surgical history and updated it with pertinent information if needed.  Past Surgical History:   Procedure Laterality Date      SECTION        SECTION N/A 2016    Procedure:  SECTION;  Surgeon: Keren Cervantes DO;  Location: [facility]      SECTION N/A 2018    Procedure:  SECTION;  Repeat  Section ;  Surgeon: Elizabeth Gudino MD;  Location: [facility]     CHOLECYSTOSTOMY         Social History   I have reviewed this patient's social history and updated it with pertinent information if needed.  Social History     Tobacco Use     Smoking status: Never Smoker     Smokeless tobacco: Never Used   Vaping Use     Vaping Use: Never used   Substance Use Topics     Alcohol use: No     Drug use: No       Family History     Mom and dad are both healthy except for her father has severe allergic reactions as well.  She has a brother that has severe allergic reactions as well.    Prior to Admission Medications   Prior to Admission Medications   Prescriptions Last Dose Informant Patient Reported? Taking?   EPINEPHrine (ANY BX GENERIC EQUIV) 0.3 MG/0.3ML injection 2-pack   No Yes   Sig: Inject 0.3 mLs (0.3 mg) into the muscle as needed for anaphylaxis   diphenhydrAMINE (BENADRYL) 25 MG capsule   No Yes   Sig: Take 1 capsule (25 mg) by mouth every 6 hours as needed for itching or allergies   vitamin C (ASCORBIC ACID)  1000 MG TABS Past Week at Unknown time  Yes Yes   Sig: Take 1,000 mg by mouth daily      Facility-Administered Medications: None     Allergies   Allergies   Allergen Reactions     Pineapple Anaphylaxis     Orange Fruit [Citrus]      Amoxicillin Rash       Physical Exam   Vital Signs:     BP: 103/59 Pulse: 84   Resp: 14 SpO2: 100 % O2 Device: None (Room air)    Weight: 0 lbs 0 oz    Constitutional: awake, alert, cooperative, no apparent distress, and appears stated age  Eyes: Lids and lashes normal, pupils equal, round and reactive to light, extra ocular muscles intact, sclera clear, conjunctiva normal  ENT: Normocephalic, without obvious abnormality, atraumatic, sinuses nontender on palpation, external ears without lesions, oral pharynx with moist mucous membranes, tonsils without erythema or exudates, gums normal and good dentition. Airway patent, no swelling  Respiratory: No increased work of breathing, good air exchange, clear to auscultation bilaterally, no crackles or wheezing  Cardiovascular: Normal apical impulse, regular rate and rhythm, normal S1 and S2, no S3 or S4, and no murmur noted  GI: No scars, normal bowel sounds, soft, non-distended, non-tender, no masses palpated, no hepatosplenomegally  Skin: no bruising or bleeding  Musculoskeletal: no lower extremity pitting edema present    Data   Data reviewed today: I reviewed all medications, new labs and imaging results over the last 24 hours. I personally reviewed no images or EKG's today.    Most Recent 3 CBC's:Recent Labs   Lab Test 04/05/22  0925 01/13/22  1717 10/07/21  2255   WBC 13.4* 5.0 7.2   HGB 10.2* 10.6* 10.4*   MCV 70* 74* 74*    177 238     Most Recent 3 BMP's:Recent Labs   Lab Test 04/05/22  0925 04/05/22  0851 01/13/22  1717 10/07/21  2257 10/07/21  2255     --  138  --  137   POTASSIUM 2.6*  --  3.5  --  3.3*   CHLORIDE 104  --  109  --  107   CO2 24  --  26  --  25   BUN 7  --  8  --  11   CR 0.65  --  0.57  --  0.89    ANIONGAP 6  --  3  --  5   SONDRA 8.6  --  8.5  --  9.4   * 161* 106*   < > 152*    < > = values in this interval not displayed.     Most Recent 2 LFT's:Recent Labs   Lab Test 04/05/22  0925 01/13/22  1717   AST 13 14   ALT 15 20   ALKPHOS 72 71   BILITOTAL 0.5 0.3     Recent Results (from the past 24 hour(s))   Head CT w/o contrast    Narrative    CT SCAN OF THE HEAD WITHOUT CONTRAST April 5, 2022 9:17 AM     HISTORY: Mental status change, unknown cause.    TECHNIQUE: Axial images of the head and coronal reformations without  IV contrast material. Radiation dose for this scan was reduced using  automated exposure control, adjustment of the mA and/or kV according  to patient size, or iterative reconstruction technique.    COMPARISON: CT head 9/29/2021.    FINDINGS: Mild motion-related artifact and mild streak artifact along  the periphery of the cerebral hemispheres, mildly limiting evaluation.  The ventricles appear normal in size and configuration. No gross acute  intracranial hemorrhage identified. No significant extra-axial fluid  collection or mass effect/herniation. The brain parenchyma appears  normal in morphology and density for age.    Orbits appear normal where visualized. The paranasal sinuses and  mastoids are clear.      Impression    IMPRESSION: No definite CT findings of acute intracranial process.    MACHO KRAFT MD         SYSTEM ID:  [id number]   XR Chest Port 1 View    Narrative    XR CHEST PORT 1 VIEW 4/5/2022 9:55 AM    HISTORY: ams    COMPARISON: None.      Impression    IMPRESSION: No focal infiltrate, pleural effusion or pneumothorax. The  cardiac and mediastinal silhouettes are normal. Right upper quadrant  surgical clips.    LIYAH GRACE MD         SYSTEM ID:  [id number]

## 2022-04-05 NOTE — ED PROVIDER NOTES
History   Chief Complaint:  Unresponsive       The history is provided by the EMS personnel. The history is limited by the condition of the patient.      Lucho Lugo is a 36 year old female with history of anaphylaxis who presents with altered mental status. Per EMS, the patient called her  home as she wasn't feeling well. Upon EMS arrival, the patient has been unresponsive. It is unknown if she ingested anything. En route, she had a blood pressure of 95 improved to 117, a blood sugar of 189, and she appeared to be breathing 6x a minute. They also gave her 0.5 mg of Epinephrine, 50 mg of Benadryl, and 125 mg of Solu-medrol. Narcan was not given. There was no witnessed seizure activity.     She notes that her daughter is also ill and taking Amoxicillin. The patient notes that she did not take any of her daughter's medication. She began to feel unwell and went to get a drink of water. She subsequently called 911. Does not recall calling her .    Review of Systems   Unable to perform ROS: Mental status change     Allergies:  Pineapple  Orange Fruit [Citrus]  Amoxicillin    Medications:  The patient is not currently taking any prescribed medications.    Past Medical History:     Gestational diabetes  Gestational thrombocytopenia  Anaphylaxis    Past Surgical History:     section x2  Cholecystectomy    Social History:  The patient presents to the ED alone via EMS.  .     Physical Exam     Patient Vitals for the past 24 hrs:   BP Pulse Resp SpO2   22 1115 114/73 70 17 100 %   22 1100 114/62 70 18 100 %   22 1015 119/63 93 12 100 %   22 1000 122/68 83 19 100 %   22 0945 133/59 85 15 100 %   22 0854 (!) 131/113 100 12 100 %     Physical Exam  Constitutional:       Appearance: She is well-developed.      Comments: Lethargic appearing, breathing spontaneously   HENT:      Head: Atraumatic.      Right Ear: External ear normal.      Left Ear: External  ear normal.      Mouth/Throat:      Mouth: Mucous membranes are moist.      Pharynx: Oropharynx is clear. No oropharyngeal exudate or posterior oropharyngeal erythema.   Eyes:      General: No scleral icterus.     Conjunctiva/sclera: Conjunctivae normal.      Pupils: Pupils are equal, round, and reactive to light.   Cardiovascular:      Rate and Rhythm: Normal rate and regular rhythm.      Heart sounds: Normal heart sounds. No murmur heard.    No friction rub. No gallop.   Pulmonary:      Effort: Pulmonary effort is normal. No respiratory distress.      Breath sounds: Normal breath sounds. No wheezing or rales.   Abdominal:      General: Bowel sounds are normal. There is no distension.      Palpations: Abdomen is soft. There is no mass.      Tenderness: There is no abdominal tenderness.   Musculoskeletal:         General: No swelling or deformity.      Cervical back: Neck supple.   Lymphadenopathy:      Cervical: No cervical adenopathy.   Skin:     General: Skin is warm and dry.      Capillary Refill: Capillary refill takes less than 2 seconds.      Findings: No rash.   Neurological:      Comments: Unable to talk, opens eyes to voice, lightly squeezes my hands bilaterally. Tremors noted in BLE       Emergency Department Course   ECG  ECG obtained at 0852, ECG read at 0902  Sinus tachycardia. Rightward axis. Borderline ECG.   Rate 101 bpm. TN interval 114 ms. QRS duration 84 ms. QT/QTc 352/456 ms. P-R-T axes 71 96 57.     Imaging:  XR Chest Port 1 View   Final Result   IMPRESSION: No focal infiltrate, pleural effusion or pneumothorax. The   cardiac and mediastinal silhouettes are normal. Right upper quadrant   surgical clips.      LIYAH GRACE MD            SYSTEM ID:  [id number]      Head CT w/o contrast   Final Result   IMPRESSION: No definite CT findings of acute intracranial process.      MACHO KRAFT MD            SYSTEM ID:  [id number]        Report per radiology    Laboratory:  Labs Ordered and Resulted from  Time of ED Arrival to Time of ED Departure   COMPREHENSIVE METABOLIC PANEL - Abnormal       Result Value    Sodium 134      Potassium 2.6 (*)     Chloride 104      Carbon Dioxide (CO2) 24      Anion Gap 6      Urea Nitrogen 7      Creatinine 0.65      Calcium 8.6      Glucose 237 (*)     Alkaline Phosphatase 72      AST 13      ALT 15      Protein Total 7.3      Albumin 3.9      Bilirubin Total 0.5      GFR Estimate >90     URINE MACROSCOPIC WITH REFLEX TO MICRO - Abnormal    Color Urine Straw      Appearance Urine Clear      Glucose Urine 300  (*)     Bilirubin Urine Negative      Ketones Urine Negative      Specific Gravity Urine 1.004      Blood Urine Negative      pH Urine 6.5      Protein Albumin Urine Negative      Urobilinogen Urine Normal      Nitrite Urine Negative      Leukocyte Esterase Urine Negative     ACETAMINOPHEN LEVEL - Abnormal    Acetaminophen <2 (*)    CBC WITH PLATELETS AND DIFFERENTIAL - Abnormal    WBC Count 13.4 (*)     RBC Count 5.27 (*)     Hemoglobin 10.2 (*)     Hematocrit 37.0      MCV 70 (*)     MCH 19.4 (*)     MCHC 27.6 (*)     RDW 18.2 (*)     Platelet Count 354     DIFFERENTIAL - Abnormal    % Neutrophils 56      % Lymphocytes 40      % Monocytes 4      % Eosinophils 0      % Basophils 0      Absolute Neutrophils 7.5      Absolute Lymphocytes 5.4 (*)     Absolute Monocytes 0.5      Absolute Eosinophils 0.0      Absolute Basophils 0.0      RBC Morphology Confirmed RBC Indices      Platelet Assessment        Value: Automated Count Confirmed. Platelet morphology is normal.    Reactive Lymphocytes Present (*)    GLUCOSE BY METER - Abnormal    GLUCOSE BY METER POCT 161 (*)    TROPONIN I - Normal    Troponin I High Sensitivity 16     TSH - Normal    TSH 0.82     HCG QUALITATIVE PREGNANCY - Normal    hCG Serum Qualitative Negative     COVID-19 VIRUS (CORONAVIRUS) BY PCR - Normal    SARS CoV2 PCR Negative     ETHYL ALCOHOL LEVEL - Normal    Alcohol ethyl <0.01     SALICYLATE LEVEL - Normal     Salicylate <2     DRUG ABUSE SCREEN 1 URINE (ED) - Normal    Amphetamines Urine Screen Negative      Barbiturates Urine Screen Negative      Benzodiazepines Urine Screen Negative      Cannabinoids Urine Screen Negative      Cocaine Urine Screen Negative      Opiates Urine Screen Negative     TYPE AND SCREEN, ADULT    ABO/RH(D) A POS      Antibody Screen Negative      SPECIMEN EXPIRATION DATE [id number]     ABO/RH TYPE AND SCREEN      Emergency Department Course:         Reviewed:  I reviewed nursing notes, vitals, past medical history and Care Everywhere    Assessments:  0848 I obtained history and examined the patient as noted above.     0852 Narcan 0.5 mg IV    0855 Narcan 0.5 mg IV    0934 The patient is more alert now.    1018 I rechecked the patient. Family member is a [facility] ICU nurse who is at bedside now.    1123 I rechecked the patient. She states that she drank water and simply passed out. She is able to talk normally now and feels mostly back to normal      Consults:  1203 I spoke with Dr. Galo, hospitalist, who accepts the patient.     Interventions:  0936 NS 1L IV  1128 Klor-Con 40 mEq PO  1131 Potassium chloride 10 mEq IV    Disposition:  The patient was admitted to the hospital under the care of Dr. Galo.     Impression & Plan     Medical Decision Making:  Patient presents for unresponsive episode.  She was still somewhat unresponsive when she arrived but breathing spontaneously.  She slowly was able to open her eyes to voice and lightly squeeze my hand.  We did give her a total of 1 mg of IV Narcan which did not seem to significantly improve her unresponsiveness.  The minimal history was that she drank water and passed out.  Family was unable to provide any further history.  After we start IV fluids and proceeding with her work-up, she became more responsive.  Her sister-in-law did come to the bedside and was able to talk to her more.  There is no evidence this was anaphylaxis or allergy  related.  I do not see any external signs of anaphylaxis such as hives or swelling.  She slowly came back to her baseline.  She is unsure what may have caused her to pass at home.  She did not recall taking any medication or eating any new foods.  She does admit to not having had a whole lot to eat or drink yesterday.  At this point given that she is almost back to her baseline, I do not feel that she needs to be in ICU to be monitored.  Her potassium was given orally and given IV.  She will need to be monitored given her episode.  She is admitted to observation under Dr. Galo    Diagnosis:    ICD-10-CM    1. Unresponsive episode  R41.89    2. Hypokalemia  E87.6      Scribe Disclosure:  I, Darrian Butcher, am serving as a scribe at 8:51 AM on 4/5/2022 to document services personally performed by Simone Briceno MD based on my observations and the provider's statements to me.          Simone Briceno MD  04/05/22 0021

## 2022-04-05 NOTE — ED NOTES
"Pt woke up and was oriented to place. Pt asked what happened and responded  \"my daughter is sick and taking amoxicillin. I went to the other side of the bed and drank water. My brain and heart were going fast. I called 911.\" MD notified.   "

## 2022-04-05 NOTE — PHARMACY-ADMISSION MEDICATION HISTORY
Admission medication history interview status for this patient is complete. See [facility] admission navigator for allergy information, prior to admission medications and immunization status.     Medication history interview done, indicate source(s): Patient  Medication history resources (including written lists, pill bottles, clinic record): None  Pharmacy:     Changes made to PTA medication list:  Added: Vitamin C  Removed: Famotidine, Ferrous sulfate, Montelukast, Prednisone    Actions taken by pharmacist (provider contacted, etc):None     Additional medication history information:None    Medication reconciliation/reorder completed by provider prior to medication history?    No    Prior to Admission medications    Medication Sig Last Dose Taking? Auth Provider   diphenhydrAMINE (BENADRYL) 25 MG capsule Take 1 capsule (25 mg) by mouth every 6 hours as needed for itching or allergies  Yes Arjun Soares MD   EPINEPHrine (ANY BX GENERIC EQUIV) 0.3 MG/0.3ML injection 2-pack Inject 0.3 mLs (0.3 mg) into the muscle as needed for anaphylaxis  Yes Bhavya Morris MD   vitamin C (ASCORBIC ACID) 1000 MG TABS Take 1,000 mg by mouth daily Past Week at Unknown time Yes Unknown, Entered By History

## 2022-04-05 NOTE — ED NOTES
[facility]  ED Nurse Handoff Report    Lucho Lugo is a 36 year old female   ED Chief complaint: Unresponsive  . ED Diagnosis:   Final diagnoses:   None     Allergies:   Allergies   Allergen Reactions     Pineapple Anaphylaxis     Orange Fruit [Citrus]      Amoxicillin Rash       Code Status: Full Code  Activity level - Baseline/Home:  Independent. Activity Level - Current:   Assist X 2. Lift room needed: No. Bariatric: No   Needed: No   Isolation: No. Infection: Not Applicable.     Vital Signs:   Vitals:    04/05/22 1000 04/05/22 1015 04/05/22 1100 04/05/22 1115   BP: 122/68 119/63 114/62 114/73   Pulse: 83 93 70 70   Resp: 19 12 18 17   SpO2: 100% 100% 100% 100%       Cardiac Rhythm:  ,      Pain level:    Patient confused: Yes. Patient Falls Risk: Yes.   Elimination Status: Straight Cath   Patient Report - Initial Complaint: Unresponsive. Focused Assessment:    09:37 Neurological Cognitive - Cognitive/Neuro/Behavioral WDL: .WDL except; arousability; level of consciousness; mood/behavior; motor response        Tests Performed: See MAR. Abnormal Results:   Labs Ordered and Resulted from Time of ED Arrival to Time of ED Departure   COMPREHENSIVE METABOLIC PANEL - Abnormal       Result Value    Sodium 134      Potassium 2.6 (*)     Chloride 104      Carbon Dioxide (CO2) 24      Anion Gap 6      Urea Nitrogen 7      Creatinine 0.65      Calcium 8.6      Glucose 237 (*)     Alkaline Phosphatase 72      AST 13      ALT 15      Protein Total 7.3      Albumin 3.9      Bilirubin Total 0.5      GFR Estimate >90     URINE MACROSCOPIC WITH REFLEX TO MICRO - Abnormal    Color Urine Straw      Appearance Urine Clear      Glucose Urine 300  (*)     Bilirubin Urine Negative      Ketones Urine Negative      Specific Gravity Urine 1.004      Blood Urine Negative      pH Urine 6.5      Protein Albumin Urine Negative      Urobilinogen Urine Normal      Nitrite Urine Negative      Leukocyte Esterase  Urine Negative     ACETAMINOPHEN LEVEL - Abnormal    Acetaminophen <2 (*)    CBC WITH PLATELETS AND DIFFERENTIAL - Abnormal    WBC Count 13.4 (*)     RBC Count 5.27 (*)     Hemoglobin 10.2 (*)     Hematocrit 37.0      MCV 70 (*)     MCH 19.4 (*)     MCHC 27.6 (*)     RDW 18.2 (*)     Platelet Count 354     DIFFERENTIAL - Abnormal    % Neutrophils 56      % Lymphocytes 40      % Monocytes 4      % Eosinophils 0      % Basophils 0      Absolute Neutrophils 7.5      Absolute Lymphocytes 5.4 (*)     Absolute Monocytes 0.5      Absolute Eosinophils 0.0      Absolute Basophils 0.0      RBC Morphology Confirmed RBC Indices      Platelet Assessment        Value: Automated Count Confirmed. Platelet morphology is normal.    Reactive Lymphocytes Present (*)    TROPONIN I - Normal    Troponin I High Sensitivity 16     TSH - Normal    TSH 0.82     HCG QUALITATIVE PREGNANCY - Normal    hCG Serum Qualitative Negative     COVID-19 VIRUS (CORONAVIRUS) BY PCR - Normal    SARS CoV2 PCR Negative     ETHYL ALCOHOL LEVEL - Normal    Alcohol ethyl <0.01     SALICYLATE LEVEL - Normal    Salicylate <2     DRUG ABUSE SCREEN 1 URINE (ED) - Normal    Amphetamines Urine Screen Negative      Barbiturates Urine Screen Negative      Benzodiazepines Urine Screen Negative      Cannabinoids Urine Screen Negative      Cocaine Urine Screen Negative      Opiates Urine Screen Negative     TYPE AND SCREEN, ADULT    ABO/RH(D) A POS      Antibody Screen Negative      SPECIMEN EXPIRATION DATE [id number]     ABO/RH TYPE AND SCREEN     XR Chest Port 1 View   Final Result   IMPRESSION: No focal infiltrate, pleural effusion or pneumothorax. The   cardiac and mediastinal silhouettes are normal. Right upper quadrant   surgical clips.      LIYAH GRACE MD            SYSTEM ID:  [id number]      Head CT w/o contrast   Final Result   IMPRESSION: No definite CT findings of acute intracranial process.      MACHO KRAFT MD            SYSTEM ID:  [id number]        .    Treatments provided: See MAR  Family Comments: N/A  OBS brochure/video discussed/provided to patient:  N/A  ED Medications:   Medications   potassium chloride ER (KLOR-CON M) CR tablet 40 mEq (has no administration in time range)   potassium chloride 10 mEq in 100 mL sterile water intermittent infusion (premix) (has no administration in time range)   0.9% sodium chloride BOLUS (0 mLs Intravenous Stopped 4/5/22 1045)   naloxone (NARCAN) injection 0.5 mg (0.5 mg Intravenous Given 4/5/22 0855)     Drips infusing:  Yes  For the majority of the shift, the patient's behavior Green. Interventions performed were N/A.    Sepsis treatment initiated: No     Patient tested for COVID 19 prior to admission: YES    ED Nurse Name/Phone Number: Dennis Waldron RN,   11:18 AM    RECEIVING UNIT ED HANDOFF REVIEW    Above ED Nurse Handoff Report was reviewed: Yes  Reviewed by: Toma Barbosa RN on April 5, 2022 at 5:05 PM

## 2022-04-06 VITALS
DIASTOLIC BLOOD PRESSURE: 62 MMHG | SYSTOLIC BLOOD PRESSURE: 108 MMHG | RESPIRATION RATE: 16 BRPM | BODY MASS INDEX: 19.77 KG/M2 | OXYGEN SATURATION: 99 % | WEIGHT: 115.8 LBS | TEMPERATURE: 98 F | HEIGHT: 64 IN | HEART RATE: 73 BPM

## 2022-04-06 LAB
ANION GAP SERPL CALCULATED.3IONS-SCNC: 5 MMOL/L (ref 3–14)
BUN SERPL-MCNC: 11 MG/DL (ref 7–30)
CALCIUM SERPL-MCNC: 8.9 MG/DL (ref 8.5–10.1)
CHLORIDE BLD-SCNC: 109 MMOL/L (ref 94–109)
CO2 SERPL-SCNC: 23 MMOL/L (ref 20–32)
CREAT SERPL-MCNC: 0.68 MG/DL (ref 0.52–1.04)
GFR SERPL CREATININE-BSD FRML MDRD: >90 ML/MIN/1.73M2
GLUCOSE BLD-MCNC: 92 MG/DL (ref 70–99)
POTASSIUM BLD-SCNC: 3.7 MMOL/L (ref 3.4–5.3)
SODIUM SERPL-SCNC: 137 MMOL/L (ref 133–144)

## 2022-04-06 PROCEDURE — 80048 BASIC METABOLIC PNL TOTAL CA: CPT | Performed by: HOSPITALIST

## 2022-04-06 PROCEDURE — G0378 HOSPITAL OBSERVATION PER HR: HCPCS

## 2022-04-06 PROCEDURE — 99217 PR OBSERVATION CARE DISCHARGE: CPT | Performed by: INTERNAL MEDICINE

## 2022-04-06 PROCEDURE — 36415 COLL VENOUS BLD VENIPUNCTURE: CPT | Performed by: HOSPITALIST

## 2022-04-06 RX ORDER — PREDNISONE 20 MG/1
20 TABLET ORAL DAILY
Qty: 4 TABLET | Refills: 0 | Status: SHIPPED | OUTPATIENT
Start: 2022-04-06 | End: 2022-04-10

## 2022-04-06 RX ORDER — CETIRIZINE HYDROCHLORIDE 10 MG/1
10 TABLET ORAL DAILY
Qty: 30 TABLET | Refills: 3 | Status: SHIPPED | OUTPATIENT
Start: 2022-04-06

## 2022-04-06 NOTE — DISCHARGE SUMMARY
"[facility]  Hospitalist Discharge Summary      Date of Admission:  4/5/2022  Date of Discharge:  4/6/2022  Discharging Provider: Philip Elizalde MD  Discharge Service: Hospitalist Service    Discharge Diagnoses   Anaphylaxis to obscure amoxicillin exposure  Hypokalemia, replaced  Leukocytosis due to stress response  Microcytic anemia; outpatient follow up recommended    Follow-ups Needed After Discharge   Follow-up Appointments     Follow-up and recommended labs and tests       Follow up with primary care provider, [facility],   within 7 days for hospital follow- up.  The following labs are recommended   at that time: CBC, iron, ferritin, TIBC, and peripheral smear (for   evaluation of microcytic anemia)  Follow up with Allergist at [facility] next available.                 Discharge Disposition   Discharged to home  Condition at discharge: Stable      Hospital Course   Lucho Lugo is a 36 year old female with history of severe allergies/anaphylaxis. Her brother and her father have both had severe allergic reactions as well; both have been hospitalized. She had an admission last fall for anaphylaxis to herbal tea at which time she was intubated.  She has seen an allergist with [facility], approximately 4 months ago.  She has had 1 other allergic reaction in the past where she was intubated.     She was admitted to [facility] on 4/5/2022 after anaphylaxis, possibly after exposure to amoxicillin.  She woke up at around 730 am in her normal state of health.  Her  gave their 6-year-old daughter a dose of amoxicillin some time after 5 AM.  Apparently after waking up the patient's daughter came to her room and gave her a big hug.  Immediately after this Lucho started feeling like \"things were growing and getting bigger inside of me\".  Her hands started to swell.  She called her , because this had happened before and she knew she " was having an allergic reaction.  She also called 911.  She was not unable to use her epinephrine because she was unable to get to her meds.  Her  arrived approximately 4 minutes later.  Her entire face, including her nose, eyes, mouth neck and hands, were swollen.  When EMS arrived she had a systolic blood pressure in the 90s.  They did note some swelling.  They also noted that she was gagging and her respiratory rate was 6.  She was given epinephrine, Benadryl, and 125 mg of Solu-Medrol.  By the time she arrived to the emergency department her symptoms had pretty much resolved. ED work up showed stable vital signs, potassium 2.6, WBC 13.4, HGB 10.2, MCV 70, and RDW 18.2.  She was admitted to the hospital for monitoring after episode of anaphylaxis. She is back to normal and feels safe to discharge home. She will discharge home on prednisone 20 mg daily for 4 days and daily Zyrtec. I am recommending that she follow up with her allergist again. I am asking that she follow up with primary care within one week for hospital follow up and also to evaluate microcytic anemia.        Consultations This Hospital Stay   None    Code Status   Full Code    Time Spent on this Encounter   I, Philip Elizalde MD, personally saw the patient today and spent greater than 30 minutes discharging this patient.       Philip Elizalde MD  [place] [address] [facility]  [address] [place] [address]  Phone: [phone]  Fax: [phone]  ______________________________________________________________________    Physical Exam   Vital Signs: Temp: 98  F (36.7  C) Temp src: Oral BP: 108/62 Pulse: 73   Resp: 16 SpO2: 99 % O2 Device: None (Room air)    Weight: 115 lbs 12.8 oz  GENERAL:  Comfortable. Cooperative.  PSYCH: pleasant, oriented, No acute distress.  EYES: PERRLA, Normal conjunctiva.  HEART:  Regular rate and rhythm. No JVD. Pulses normal. No edema.  LUNGS:  Clear to auscultation, normal  Respiratory effort.  ABDOMEN:  Soft, no hepatosplenomegaly, normal bowel sounds.  EXTREMETIES: No clubbing, cyanosis or ischemia  SKIN:  Dry to touch, No rash.         Primary Care Physician   [facility]    Discharge Orders      Reason for your hospital stay    Anaphylaxis to amoxicillin     Activity    Your activity upon discharge: activity as tolerated     Follow-up and recommended labs and tests     Follow up with primary care provider, [facility], within 7 days for hospital follow- up.  The following labs are recommended at that time: CBC, iron, ferritin, TIBC, and peripheral smear (for evaluation of microcytic anemia)  Follow up with Allergist at [facility] next available.     Diet    Follow this diet upon discharge: Regular       Significant Results and Procedures   Most Recent 3 CBC's:Recent Labs   Lab Test 04/05/22  0925 01/13/22  1717 10/07/21  2255   WBC 13.4* 5.0 7.2   HGB 10.2* 10.6* 10.4*   MCV 70* 74* 74*    177 238     Most Recent 3 BMP's:Recent Labs   Lab Test 04/06/22  0727 04/05/22  1708 04/05/22  0925 04/05/22  0851 01/13/22  1717     --  134  --  138   POTASSIUM 3.7 4.4 2.6*  --  3.5   CHLORIDE 109  --  104  --  109   CO2 23  --  24  --  26   BUN 11  --  7  --  8   CR 0.68  --  0.65  --  0.57   ANIONGAP 5  --  6  --  3   SONDRA 8.9  --  8.6  --  8.5   GLC 92  --  237* 161* 106*   ,   Results for orders placed or performed during the hospital encounter of 04/05/22   Head CT w/o contrast    Narrative    CT SCAN OF THE HEAD WITHOUT CONTRAST April 5, 2022 9:17 AM     HISTORY: Mental status change, unknown cause.    TECHNIQUE: Axial images of the head and coronal reformations without  IV contrast material. Radiation dose for this scan was reduced using  automated exposure control, adjustment of the mA and/or kV according  to patient size, or iterative reconstruction technique.    COMPARISON: CT head 9/29/2021.    FINDINGS: Mild motion-related artifact  and mild streak artifact along  the periphery of the cerebral hemispheres, mildly limiting evaluation.  The ventricles appear normal in size and configuration. No gross acute  intracranial hemorrhage identified. No significant extra-axial fluid  collection or mass effect/herniation. The brain parenchyma appears  normal in morphology and density for age.    Orbits appear normal where visualized. The paranasal sinuses and  mastoids are clear.      Impression    IMPRESSION: No definite CT findings of acute intracranial process.    MACHO KRAFT MD         SYSTEM ID:  [id number]   XR Chest Port 1 View    Narrative    XR CHEST PORT 1 VIEW 4/5/2022 9:55 AM    HISTORY: ams    COMPARISON: None.      Impression    IMPRESSION: No focal infiltrate, pleural effusion or pneumothorax. The  cardiac and mediastinal silhouettes are normal. Right upper quadrant  surgical clips.    LIYAH GRACE MD         SYSTEM ID:  [id number]       Discharge Medications   Current Discharge Medication List      START taking these medications    Details   cetirizine (ZYRTEC) 10 MG tablet Take 1 tablet (10 mg) by mouth daily  Qty: 30 tablet, Refills: 3    Associated Diagnoses: Anaphylaxis, initial encounter      predniSONE (DELTASONE) 20 MG tablet Take 1 tablet (20 mg) by mouth daily for 4 days  Qty: 4 tablet, Refills: 0    Associated Diagnoses: Anaphylaxis, initial encounter         CONTINUE these medications which have NOT CHANGED    Details   diphenhydrAMINE (BENADRYL) 25 MG capsule Take 1 capsule (25 mg) by mouth every 6 hours as needed for itching or allergies  Qty: 20 capsule, Refills: 0      EPINEPHrine (ANY BX GENERIC EQUIV) 0.3 MG/0.3ML injection 2-pack Inject 0.3 mLs (0.3 mg) into the muscle as needed for anaphylaxis  Qty: 2 each, Refills: 1    Associated Diagnoses: Anaphylaxis, initial encounter      vitamin C (ASCORBIC ACID) 1000 MG TABS Take 1,000 mg by mouth daily           Allergies   Allergies   Allergen Reactions     Pineapple  Anaphylaxis     Orange Fruit [Citrus]      Amoxicillin Rash

## 2022-04-06 NOTE — PLAN OF CARE
"Goal Outcome Evaluation:    VSS. Denies pain. Denies SOB. Reports slight itchiness in L groin. Discharge education completed with patient. Plan to follow-up with allergist outpatient.     /62 (BP Location: Left arm)   Pulse 73   Temp 98  F (36.7  C) (Oral)   Resp 16   Ht 1.626 m (5' 4\")   Wt 52.5 kg (115 lb 12.8 oz)   SpO2 99%   BMI 19.88 kg/m      "

## 2022-04-06 NOTE — PLAN OF CARE
PRIMARY DIAGNOSIS: ANAPHYLAXIS  OUTPATIENT/OBSERVATION GOALS TO BE MET BEFORE DISCHARGE:  1.  No return of symptoms after 12 hrs of observation: Yes  2.  No oropharngyeal edema or bronchospasm: Yes    3.  Stable vitals or return to baseline: Yes    4.  Tolerate oral intake to maintain hydration: Yes    5.  Improved dyspnea or minimal O2 sats of 88% or previous baseline levels: Yes       SpO2: 100 %, O2 Device: None (Room air)    6.  Return to near baseline physical activity: Yes    Discharge Planner Nurse   Safe discharge environment identified: Yes  Barriers to discharge: No       Entered by: Amy C. Severson 04/06/2022 2:02 AM     Please review provider order for any additional goals.   Nurse to notify provider when observation goals have been met and patient is ready for discharge.

## 2022-04-06 NOTE — PROGRESS NOTES
Patient with headache. Already received tylenol 2 hours prior. Ibuprofen 400mg.    Taran Rainey MD

## 2022-04-06 NOTE — PLAN OF CARE
PRIMARY DIAGNOSIS: ANAPHYLAXIS  OUTPATIENT/OBSERVATION GOALS TO BE MET BEFORE DISCHARGE:  1.  No return of symptoms after 12 hrs of observation: No  2.  No oropharngyeal edema or bronchospasm: No    3.  Stable vitals or return to baseline: Yes    4.  Tolerate oral intake to maintain hydration: Yes    5.  Improved dyspnea or minimal O2 sats of 88% or previous baseline levels: Yes       SpO2: 100 %, O2 Device: None (Room air)    6.  Return to near baseline physical activity: Yes    Discharge Planner Nurse   Safe discharge environment identified: Yes  Barriers to discharge: No       Entered by: Amy C. Severson 04/05/2022 10:26 PM     Please review provider order for any additional goals.   Nurse to notify provider when observation goals have been met and patient is ready for discharge.

## 2022-04-06 NOTE — PLAN OF CARE
"Pt A&O x4, VSS, RA, LS CTA, had a headache the beginning of the shift, Advil given - no further issues.Reg diet, up independently. No further s/s of reaction.  Should discharge today.  Continue plan of care    PRIMARY DIAGNOSIS: ANAPHYLAXIS  OUTPATIENT/OBSERVATION GOALS TO BE MET BEFORE DISCHARGE:  1.  No return of symptoms after 12 hrs of observation: Yes  2.  No oropharngyeal edema or bronchospasm: Yes    3.  Stable vitals or return to baseline: Yes    4.  Tolerate oral intake to maintain hydration: Yes    5.  Improved dyspnea or minimal O2 sats of 88% or previous baseline levels: Yes       SpO2: 100 %, O2 Device: None (Room air)    6.  Return to near baseline physical activity: Yes    Discharge Planner Nurse   Safe discharge environment identified: Yes  Barriers to discharge: No       Entered by: Amy C. Severson 04/06/2022 6:01 AM     Please review provider order for any additional goals.   Nurse to notify provider when observation goals have been met and patient is ready for discharge.                        Goal Outcome Evaluation:      Problem: Plan of Care - These are the overarching goals to be used throughout the patient stay.    Goal: Plan of Care Review/Shift Note  Description: The Plan of Care Review/Shift note should be completed every shift.  The Outcome Evaluation is a brief statement about your assessment that the patient is improving, declining, or no change.  This information will be displayed automatically on your shift note.  4/6/2022 0600 by Severson, Amy C, RN  Outcome: Met  Flowsheets (Taken 4/6/2022 0600)  Plan of Care Reviewed With: patient  Overall Patient Progress: improving  4/6/2022 0134 by Severson, Amy C, RN  Outcome: Ongoing, Progressing  Flowsheets (Taken 4/6/2022 0134)  Plan of Care Reviewed With: patient  Goal: Patient-Specific Goal (Individualized)  Description: You can add care plan individualizations to a care plan. Examples of Individualization might be:  \"Parent requests to be " "called daily at 9am for status\", \"I have a hard time hearing out of my right ear\", or \"Do not touch me to wake me up as it startles me\".  4/6/2022 0600 by Severson, Amy C, RN  Outcome: Met  4/6/2022 0134 by Severson, Amy C, RN  Outcome: Ongoing, Progressing  Goal: Absence of Hospital-Acquired Illness or Injury  4/6/2022 0600 by Severson, Amy C, RN  Outcome: Met  4/6/2022 0134 by Severson, Amy C, RN  Outcome: Ongoing, Progressing  Intervention: Identify and Manage Fall Risk  Recent Flowsheet Documentation  Taken 4/5/2022 2000 by Severson, Amy C, RN  Safety Promotion/Fall Prevention:   safety round/check completed   nonskid shoes/slippers when out of bed  Intervention: Prevent and Manage VTE (Venous Thromboembolism) Risk  Recent Flowsheet Documentation  Taken 4/5/2022 2000 by Severson, Amy C, RN  Activity Management: up ad [place]  Goal: Optimal Comfort and Wellbeing  4/6/2022 0600 by Severson, Amy C, RN  Outcome: Met  4/6/2022 0134 by Severson, Amy C, RN  Outcome: Ongoing, Progressing  Goal: Readiness for Transition of Care  4/6/2022 0600 by Severson, Amy C, RN  Outcome: Met  4/6/2022 0134 by Severson, Amy C, RN  Outcome: Ongoing, Progressing       Plan of Care Reviewed With: patient     Overall Patient Progress: improving           "

## 2022-09-11 ENCOUNTER — HEALTH MAINTENANCE LETTER (OUTPATIENT)
Age: 37
End: 2022-09-11

## 2023-04-30 ENCOUNTER — HEALTH MAINTENANCE LETTER (OUTPATIENT)
Age: 38
End: 2023-04-30

## 2023-11-03 ENCOUNTER — APPOINTMENT (OUTPATIENT)
Dept: CT IMAGING | Facility: CLINIC | Age: 38
End: 2023-11-03
Attending: EMERGENCY MEDICINE
Payer: COMMERCIAL

## 2023-11-03 ENCOUNTER — APPOINTMENT (OUTPATIENT)
Dept: CARDIOLOGY | Facility: CLINIC | Age: 38
End: 2023-11-03
Payer: COMMERCIAL

## 2023-11-03 ENCOUNTER — HOSPITAL ENCOUNTER (OUTPATIENT)
Facility: CLINIC | Age: 38
Setting detail: OBSERVATION
Discharge: HOME | End: 2023-11-04
Attending: EMERGENCY MEDICINE | Admitting: HOSPITALIST
Payer: COMMERCIAL

## 2023-11-03 DIAGNOSIS — R56.9 SEIZURE-LIKE ACTIVITY (H): ICD-10-CM

## 2023-11-03 DIAGNOSIS — S02.2XXA CLOSED FRACTURE OF NASAL BONE, INITIAL ENCOUNTER: ICD-10-CM

## 2023-11-03 DIAGNOSIS — S01.511A LIP LACERATION, INITIAL ENCOUNTER: ICD-10-CM

## 2023-11-03 DIAGNOSIS — R55 SYNCOPE AND COLLAPSE: ICD-10-CM

## 2023-11-03 DIAGNOSIS — R41.82 ALTERED MENTAL STATUS, UNSPECIFIED ALTERED MENTAL STATUS TYPE: ICD-10-CM

## 2023-11-03 LAB
ABO/RH(D): NORMAL
ACANTHOCYTES BLD QL SMEAR: ABNORMAL
ALBUMIN SERPL BCG-MCNC: 4 G/DL (ref 3.5–5.2)
ALP SERPL-CCNC: 61 U/L (ref 35–104)
ALT SERPL W P-5'-P-CCNC: 19 U/L (ref 0–50)
AMPHETAMINES UR QL SCN: NORMAL
ANION GAP SERPL CALCULATED.3IONS-SCNC: 9 MMOL/L (ref 7–15)
ANTIBODY SCREEN: NEGATIVE
APAP SERPL-MCNC: 13.2 UG/ML (ref 10–30)
AST SERPL W P-5'-P-CCNC: 25 U/L (ref 0–45)
ATRIAL RATE - MUSE: 66 BPM
AUER BODIES BLD QL SMEAR: ABNORMAL
BARBITURATES UR QL SCN: NORMAL
BASO STIPL BLD QL SMEAR: ABNORMAL
BASOPHILS # BLD AUTO: 0 10E3/UL (ref 0–0.2)
BASOPHILS NFR BLD AUTO: 0 %
BENZODIAZ UR QL SCN: NORMAL
BILIRUB SERPL-MCNC: 0.3 MG/DL
BITE CELLS BLD QL SMEAR: ABNORMAL
BLISTER CELLS BLD QL SMEAR: ABNORMAL
BUN SERPL-MCNC: 11.1 MG/DL (ref 6–20)
BURR CELLS BLD QL SMEAR: ABNORMAL
BZE UR QL SCN: NORMAL
CALCIUM SERPL-MCNC: 8.8 MG/DL (ref 8.6–10)
CANNABINOIDS UR QL SCN: NORMAL
CHLORIDE SERPL-SCNC: 104 MMOL/L (ref 98–107)
CREAT SERPL-MCNC: 0.62 MG/DL (ref 0.51–0.95)
DACRYOCYTES BLD QL SMEAR: ABNORMAL
DEPRECATED HCO3 PLAS-SCNC: 22 MMOL/L (ref 22–29)
DIASTOLIC BLOOD PRESSURE - MUSE: NORMAL MMHG
EGFRCR SERPLBLD CKD-EPI 2021: >90 ML/MIN/1.73M2
ELLIPTOCYTES BLD QL SMEAR: SLIGHT
EOSINOPHIL # BLD AUTO: 0 10E3/UL (ref 0–0.7)
EOSINOPHIL NFR BLD AUTO: 0 %
ERYTHROCYTE [DISTWIDTH] IN BLOOD BY AUTOMATED COUNT: 19.5 % (ref 10–15)
ETHANOL SERPL-MCNC: <0.01 G/DL
FENTANYL UR QL: NORMAL
FLUAV RNA SPEC QL NAA+PROBE: NEGATIVE
FLUBV RNA RESP QL NAA+PROBE: NEGATIVE
FRAGMENTS BLD QL SMEAR: SLIGHT
GLUCOSE BLDC GLUCOMTR-MCNC: 96 MG/DL (ref 70–99)
GLUCOSE SERPL-MCNC: 105 MG/DL (ref 70–99)
HCG SERPL QL: NEGATIVE
HCO3 BLDV-SCNC: 21 MMOL/L (ref 21–28)
HCT VFR BLD AUTO: 30.2 % (ref 35–47)
HGB BLD-MCNC: 8.5 G/DL (ref 11.7–15.7)
HGB C CRYSTALS: ABNORMAL
HOLD SPECIMEN: NORMAL
HOWELL-JOLLY BOD BLD QL SMEAR: ABNORMAL
IMM GRANULOCYTES # BLD: 0 10E3/UL
IMM GRANULOCYTES NFR BLD: 1 %
INTERPRETATION ECG - MUSE: NORMAL
LACTATE BLD-SCNC: 1.7 MMOL/L
LVEF ECHO: NORMAL
LYMPHOCYTES # BLD AUTO: 1.3 10E3/UL (ref 0.8–5.3)
LYMPHOCYTES NFR BLD AUTO: 19 %
MCH RBC QN AUTO: 18.7 PG (ref 26.5–33)
MCHC RBC AUTO-ENTMCNC: 28.1 G/DL (ref 31.5–36.5)
MCV RBC AUTO: 67 FL (ref 78–100)
MONOCYTES # BLD AUTO: 0.8 10E3/UL (ref 0–1.3)
MONOCYTES NFR BLD AUTO: 11 %
NEUTROPHILS # BLD AUTO: 4.6 10E3/UL (ref 1.6–8.3)
NEUTROPHILS NFR BLD AUTO: 69 %
NEUTS HYPERSEG BLD QL SMEAR: ABNORMAL
NRBC # BLD AUTO: 0 10E3/UL
NRBC BLD AUTO-RTO: 0 /100
OPIATES UR QL SCN: NORMAL
P AXIS - MUSE: 69 DEGREES
PCO2 BLDV: 41 MM HG (ref 40–50)
PCP QUAL URINE (ROCHE): NORMAL
PH BLDV: 7.32 [PH] (ref 7.32–7.43)
PLAT MORPH BLD: ABNORMAL
PLATELET # BLD AUTO: 164 10E3/UL (ref 150–450)
PO2 BLDV: 35 MM HG (ref 25–47)
POLYCHROMASIA BLD QL SMEAR: ABNORMAL
POTASSIUM SERPL-SCNC: 3.8 MMOL/L (ref 3.4–5.3)
PR INTERVAL - MUSE: 156 MS
PROT SERPL-MCNC: 7 G/DL (ref 6.4–8.3)
QRS DURATION - MUSE: 82 MS
QT - MUSE: 428 MS
QTC - MUSE: 448 MS
R AXIS - MUSE: 85 DEGREES
RBC # BLD AUTO: 4.54 10E6/UL (ref 3.8–5.2)
RBC AGGLUT BLD QL: ABNORMAL
RBC MORPH BLD: ABNORMAL
ROULEAUX BLD QL SMEAR: ABNORMAL
RSV RNA SPEC NAA+PROBE: NEGATIVE
SAO2 % BLDV: 64 % (ref 94–100)
SARS-COV-2 RNA RESP QL NAA+PROBE: NEGATIVE
SICKLE CELLS BLD QL SMEAR: ABNORMAL
SMUDGE CELLS BLD QL SMEAR: ABNORMAL
SODIUM SERPL-SCNC: 135 MMOL/L (ref 135–145)
SPECIMEN EXPIRATION DATE: NORMAL
SPHEROCYTES BLD QL SMEAR: ABNORMAL
STOMATOCYTES BLD QL SMEAR: ABNORMAL
SYSTOLIC BLOOD PRESSURE - MUSE: NORMAL MMHG
T AXIS - MUSE: 58 DEGREES
TARGETS BLD QL SMEAR: ABNORMAL
TOXIC GRANULES BLD QL SMEAR: ABNORMAL
VARIANT LYMPHS BLD QL SMEAR: ABNORMAL
VENTRICULAR RATE- MUSE: 66 BPM
WBC # BLD AUTO: 6.7 10E3/UL (ref 4–11)

## 2023-11-03 PROCEDURE — 250N000013 HC RX MED GY IP 250 OP 250 PS 637

## 2023-11-03 PROCEDURE — 258N000003 HC RX IP 258 OP 636: Performed by: EMERGENCY MEDICINE

## 2023-11-03 PROCEDURE — 80053 COMPREHEN METABOLIC PANEL: CPT | Performed by: EMERGENCY MEDICINE

## 2023-11-03 PROCEDURE — 93306 TTE W/DOPPLER COMPLETE: CPT | Mod: 26 | Performed by: INTERNAL MEDICINE

## 2023-11-03 PROCEDURE — 80307 DRUG TEST PRSMV CHEM ANLYZR: CPT | Performed by: EMERGENCY MEDICINE

## 2023-11-03 PROCEDURE — 80143 DRUG ASSAY ACETAMINOPHEN: CPT | Performed by: EMERGENCY MEDICINE

## 2023-11-03 PROCEDURE — G0378 HOSPITAL OBSERVATION PER HR: HCPCS

## 2023-11-03 PROCEDURE — 82962 GLUCOSE BLOOD TEST: CPT

## 2023-11-03 PROCEDURE — 82077 ASSAY SPEC XCP UR&BREATH IA: CPT | Performed by: EMERGENCY MEDICINE

## 2023-11-03 PROCEDURE — 99285 EMERGENCY DEPT VISIT HI MDM: CPT | Mod: 25

## 2023-11-03 PROCEDURE — 82803 BLOOD GASES ANY COMBINATION: CPT

## 2023-11-03 PROCEDURE — 87637 SARSCOV2&INF A&B&RSV AMP PRB: CPT | Performed by: EMERGENCY MEDICINE

## 2023-11-03 PROCEDURE — 93306 TTE W/DOPPLER COMPLETE: CPT

## 2023-11-03 PROCEDURE — 36415 COLL VENOUS BLD VENIPUNCTURE: CPT | Performed by: EMERGENCY MEDICINE

## 2023-11-03 PROCEDURE — 70486 CT MAXILLOFACIAL W/O DYE: CPT

## 2023-11-03 PROCEDURE — 85004 AUTOMATED DIFF WBC COUNT: CPT | Performed by: EMERGENCY MEDICINE

## 2023-11-03 PROCEDURE — 86901 BLOOD TYPING SEROLOGIC RH(D): CPT | Performed by: EMERGENCY MEDICINE

## 2023-11-03 PROCEDURE — 96360 HYDRATION IV INFUSION INIT: CPT

## 2023-11-03 PROCEDURE — 70450 CT HEAD/BRAIN W/O DYE: CPT

## 2023-11-03 PROCEDURE — 93005 ELECTROCARDIOGRAM TRACING: CPT

## 2023-11-03 PROCEDURE — 99223 1ST HOSP IP/OBS HIGH 75: CPT

## 2023-11-03 PROCEDURE — 84703 CHORIONIC GONADOTROPIN ASSAY: CPT | Performed by: EMERGENCY MEDICINE

## 2023-11-03 PROCEDURE — 80047 BASIC METABLC PNL IONIZED CA: CPT | Mod: XU

## 2023-11-03 PROCEDURE — 72125 CT NECK SPINE W/O DYE: CPT

## 2023-11-03 RX ORDER — ONDANSETRON 2 MG/ML
4 INJECTION INTRAMUSCULAR; INTRAVENOUS EVERY 6 HOURS PRN
Status: DISCONTINUED | OUTPATIENT
Start: 2023-11-03 | End: 2023-11-04 | Stop reason: HOSPADM

## 2023-11-03 RX ORDER — LIDOCAINE 40 MG/G
CREAM TOPICAL
Status: DISCONTINUED | OUTPATIENT
Start: 2023-11-03 | End: 2023-11-04 | Stop reason: HOSPADM

## 2023-11-03 RX ORDER — ONDANSETRON 4 MG/1
4 TABLET, ORALLY DISINTEGRATING ORAL EVERY 6 HOURS PRN
Status: DISCONTINUED | OUTPATIENT
Start: 2023-11-03 | End: 2023-11-04 | Stop reason: HOSPADM

## 2023-11-03 RX ORDER — IBUPROFEN 600 MG/1
600 TABLET, FILM COATED ORAL EVERY 6 HOURS PRN
Status: DISCONTINUED | OUTPATIENT
Start: 2023-11-03 | End: 2023-11-04 | Stop reason: HOSPADM

## 2023-11-03 RX ORDER — AMOXICILLIN 250 MG
2 CAPSULE ORAL 2 TIMES DAILY PRN
Status: DISCONTINUED | OUTPATIENT
Start: 2023-11-03 | End: 2023-11-04 | Stop reason: HOSPADM

## 2023-11-03 RX ORDER — ACETAMINOPHEN 325 MG/1
650 TABLET ORAL EVERY 6 HOURS PRN
Status: DISCONTINUED | OUTPATIENT
Start: 2023-11-03 | End: 2023-11-04 | Stop reason: HOSPADM

## 2023-11-03 RX ORDER — ACETAMINOPHEN 650 MG/1
650 SUPPOSITORY RECTAL EVERY 6 HOURS PRN
Status: DISCONTINUED | OUTPATIENT
Start: 2023-11-03 | End: 2023-11-04 | Stop reason: HOSPADM

## 2023-11-03 RX ORDER — AMOXICILLIN 250 MG
1 CAPSULE ORAL 2 TIMES DAILY PRN
Status: DISCONTINUED | OUTPATIENT
Start: 2023-11-03 | End: 2023-11-04 | Stop reason: HOSPADM

## 2023-11-03 RX ADMIN — ACETAMINOPHEN 650 MG: 325 TABLET, FILM COATED ORAL at 14:28

## 2023-11-03 RX ADMIN — Medication 4 MG: at 06:17

## 2023-11-03 RX ADMIN — SODIUM CHLORIDE 1000 ML: 9 INJECTION, SOLUTION INTRAVENOUS at 06:29

## 2023-11-03 ASSESSMENT — COLUMBIA-SUICIDE SEVERITY RATING SCALE - C-SSRS
2. HAVE YOU ACTUALLY HAD ANY THOUGHTS OF KILLING YOURSELF IN THE PAST MONTH?: NO
1. IN THE PAST MONTH, HAVE YOU WISHED YOU WERE DEAD OR WISHED YOU COULD GO TO SLEEP AND NOT WAKE UP?: NO
6. HAVE YOU EVER DONE ANYTHING, STARTED TO DO ANYTHING, OR PREPARED TO DO ANYTHING TO END YOUR LIFE?: NO

## 2023-11-03 ASSESSMENT — ACTIVITIES OF DAILY LIVING (ADL)
ADLS_ACUITY_SCORE: 31
ADLS_ACUITY_SCORE: 35
ADLS_ACUITY_SCORE: 31

## 2023-11-03 NOTE — CARE PLAN
PRIMARY DIAGNOSIS: Syncope  OUTPATIENT/OBSERVATION GOALS TO BE MET BEFORE DISCHARGE:  ADLs back to baseline: No    Activity and level of assistance: X 1 assist    Pain status: Improved-controlled with oral pain medications.    Return to near baseline physical activity: No     Discharge Planner Nurse   Safe discharge environment identified: Yes  Barriers to discharge: Yes       Please review provider order for any additional goals.   Nurse to notify provider when observation goals have been met and patient is ready for discharge.

## 2023-11-03 NOTE — PHARMACY-ADMISSION MEDICATION HISTORY
Pharmacist Admission Medication History    Admission medication history is complete. The information provided in this note is only as accurate as the sources available at the time of the update.    Information Source(s): Patient via in-person    Pertinent Information: Eye drops - does not remember the name, OTC, uses scheduled q4h during the day.    Changes made to PTA medication list:  Added: eye drops  Deleted: none  Changed: zyrtec from scheduled to prn    Medication Affordability:  Not including over the counter (OTC) medications, was there a time in the past 3 months when you did not take your medications as prescribed because of cost?: No    Allergies reviewed with patient and updates made in EHR: yes    Prior to Admission medications    Medication Sig Last Dose Taking?   cetirizine (ZYRTEC) 10 MG tablet Take 1 tablet (10 mg) by mouth daily  Patient taking differently: Take 10 mg by mouth daily as needed prn Yes   diphenhydrAMINE (BENADRYL) 25 MG capsule Take 1 capsule (25 mg) by mouth every 6 hours as needed for itching or allergies prn Yes   EPINEPHrine (ANY BX GENERIC EQUIV) 0.3 MG/0.3ML injection 2-pack Inject 0.3 mLs (0.3 mg) into the muscle as needed for anaphylaxis prn Yes   NONFORMULARY Place 1 drop into both eyes every 4 hours OTC Eye drops - for itchy/dry eyes - q4h during the day 11/2/2023 Yes   vitamin C (ASCORBIC ACID) 1000 MG TABS Take 1,000 mg by mouth daily 11/2/2023 Yes

## 2023-11-03 NOTE — ED NOTES
Pt returned from CT, started to be more responsive with eye opens to voice, able to node when answering question. Nonverbal and not able to move all extremity, sensory still intact when being touched, but completely flaccid muscle tones, pupils equal and reactive to light +3. Node head for pain. Noted skin abrasion to right shoulder without bleeding. Pt VSS

## 2023-11-03 NOTE — ED TRIAGE NOTES
Patient is minimal responsive in triage. Here with  per  she took some Nyquil. Before bed and some this morning at 4am. Unable to keep eyes open. Able to say hospital.     Fall from standing on wood floor. . Lac to lip.     did CPR on patient and then splash cold water to get her to open eyes. Pt does not lift arms by self.

## 2023-11-03 NOTE — ED NOTES
Pt assist of 2 to bedside commode- able to stand with no dizziness/lightheadedness and perform self maribel cares. Urine sample sent

## 2023-11-03 NOTE — ED NOTES
[facility]  ED Nurse Handoff Report    ED Chief complaint: Altered Mental Status and Fall  . ED Diagnosis:   Final diagnoses:   Altered mental status, unspecified altered mental status type   Syncope and collapse   Seizure-like activity (H)   Closed fracture of nasal bone, initial encounter   Lip laceration, initial encounter       Allergies:   Allergies   Allergen Reactions    Pineapple Anaphylaxis    Orange Fruit [Citrus]     Amoxicillin Rash       Code Status: Full Code    Activity level - Baseline/Home:  independent.  Activity Level - Current:   assist of 1.   Lift room needed: No.   Bariatric: No   Needed: No   Isolation: No.   Infection: Not Applicable.     Respiratory status: 2L NC    Vital Signs (within 30 minutes):   Vitals:    11/03/23 0650 11/03/23 0700 11/03/23 0715 11/03/23 0716   BP: 120/75 119/69 123/72    Pulse: 60 64 76 77   Resp:       Temp:       TempSrc:       SpO2: 100% 100%  100%       Cardiac Rhythm:  ,      Pain level:    Patient confused: No.   Patient Falls Risk: arm band in place, patient and family education, and activity supervised.   Elimination Status: Has voided     Patient Report - Initial Complaint: Unresponsive episode  Focused Assessment: Lucho Lugo is a 38 year old female who presents to the ED for an evaluation of altered mental status of a fall.  reports she started feeling a little sick last night after getting off from work as a . She had no complaints of severe pain, just nauseas.The patient took some Nyquil and went to bed at around 11 pm. This morning, she woke up at around 5 am to get ready for work. She took some more Nyquil and as she was standing in the kitchen boiling some water, she passed out facing forward and striking her face onto the floor. Family member reports she has a seizure of about 2 minutes along with some shakiness. Upon arrival, [patient was responding and able to communicate up until  she reached the ED room. Denies drug use or alcohol use. No health problems just allergies and lastly, no coughing or headaches.        Abnormal Results:   Labs Ordered and Resulted from Time of ED Arrival to Time of ED Departure   COMPREHENSIVE METABOLIC PANEL - Abnormal       Result Value    Sodium 135      Potassium 3.8      Carbon Dioxide (CO2) 22      Anion Gap 9      Urea Nitrogen 11.1      Creatinine 0.62      GFR Estimate >90      Calcium 8.8      Chloride 104      Glucose 105 (*)     Alkaline Phosphatase 61      AST 25      ALT 19      Protein Total 7.0      Albumin 4.0      Bilirubin Total 0.3     CBC WITH PLATELETS AND DIFFERENTIAL - Abnormal    WBC Count 6.7      RBC Count 4.54      Hemoglobin 8.5 (*)     Hematocrit 30.2 (*)     MCV 67 (*)     MCH 18.7 (*)     MCHC 28.1 (*)     RDW 19.5 (*)     Platelet Count 164      % Neutrophils 69      % Lymphocytes 19      % Monocytes 11      % Eosinophils 0      % Basophils 0      % Immature Granulocytes 1      NRBCs per 100 WBC 0      Absolute Neutrophils 4.6      Absolute Lymphocytes 1.3      Absolute Monocytes 0.8      Absolute Eosinophils 0.0      Absolute Basophils 0.0      Absolute Immature Granulocytes 0.0      Absolute NRBCs 0.0     ISTAT GASES LACTATE VENOUS POCT - Abnormal    Lactic Acid POCT 1.7      Bicarbonate Venous POCT 21      O2 Sat, Venous POCT 64 (*)     pCO2 Venous POCT 41      pH Venous POCT 7.32      pO2 Venous POCT 35     RBC AND PLATELET MORPHOLOGY - Abnormal    Platelet Assessment        Value: Automated Count Confirmed. Platelet morphology is normal.    Acanthocytes        Shanti Rods        Basophilic Stippling        Bite Cells        Blister Cells        [place] Cells        Elliptocytes Slight (*)     Hgb C Crystals        [place]-Jolly Bodies        Hypersegmented Neutrophils        Polychromasia        RBC agglutination        RBC Fragments Slight (*)     Reactive Lymphocytes        Rouleaux        Sickle Cells        Smudge Cells         Spherocytes        Stomatocytes        Target Cells        Teardrop Cells        Toxic Neutrophils        RBC Morphology Confirmed RBC Indices     HCG QUALITATIVE PREGNANCY - Normal    hCG Serum Qualitative Negative     ETHYL ALCOHOL LEVEL - Normal    Alcohol ethyl <0.01     GLUCOSE BY METER - Normal    GLUCOSE BY METER POCT 96     ACETAMINOPHEN LEVEL - Normal    Acetaminophen 13.2     GLUCOSE MONITOR NURSING POCT   INFLUENZA A/B, RSV, & SARS-COV2 PCR   URINE DRUG SCREEN PANEL   TYPE AND SCREEN, ADULT    SPECIMEN EXPIRATION DATE [id number]     ABO/RH TYPE AND SCREEN        CT Cervical Spine w/o Contrast   Final Result   IMPRESSION:   HEAD CT:   1.  No acute intracranial abnormality.   2.  No calvarial or skull base fracture.      FACIAL BONE CT:   1.  Acute, minimally displaced fracture of the left nasal bone.   2.  No additional maxillofacial mandibular fracture.      CERVICAL SPINE CT:   1.  No fracture or posttraumatic subluxation.   2.  No high-grade spinal canal or neural foraminal stenosis.      CT Facial Bones without Contrast   Final Result   IMPRESSION:   HEAD CT:   1.  No acute intracranial abnormality.   2.  No calvarial or skull base fracture.      FACIAL BONE CT:   1.  Acute, minimally displaced fracture of the left nasal bone.   2.  No additional maxillofacial mandibular fracture.      CERVICAL SPINE CT:   1.  No fracture or posttraumatic subluxation.   2.  No high-grade spinal canal or neural foraminal stenosis.      CT Head w/o Contrast   Final Result   IMPRESSION:   HEAD CT:   1.  No acute intracranial abnormality.   2.  No calvarial or skull base fracture.      FACIAL BONE CT:   1.  Acute, minimally displaced fracture of the left nasal bone.   2.  No additional maxillofacial mandibular fracture.      CERVICAL SPINE CT:   1.  No fracture or posttraumatic subluxation.   2.  No high-grade spinal canal or neural foraminal stenosis.          Treatments provided: See MAR   Family Comments: Family at  bedside  OBS brochure/video discussed/provided to patient:  Yes  ED Medications:   Medications   sodium chloride 0.9% BOLUS 1,000 mL (0 mLs Intravenous Stopped 11/3/23 2671)   naloxone (NARCAN) nasal spray 4 mg (4 mg Alternating Nostrils $Given 11/3/23 0617)       Drips infusing:  No  For the majority of the shift this patient was Green.   Interventions performed were N/A    Sepsis treatment initiated: No    Cares/treatment/interventions/medications to be completed following ED care: Neuro checks    ED Nurse Name: Marlon D Reyes, RN  8:13 AM     RECEIVING UNIT ED HANDOFF REVIEW    Above ED Nurse Handoff Report was reviewed: Yes  Reviewed by: Gema Randle RN on November 3, 2023 at 1:25 PM

## 2023-11-03 NOTE — H&P
[facility]    History and Physical - Hospitalist Service       Date of Admission:  11/3/2023    Assessment & Plan      Lucho Lugo is a 38 year old female with PMH hx anaphylaxis reaction, microcytic anemia who presents after a possible syncopal event.    Patient took some Nyquil 30 mL last evening as she did not feel well due to sore throat. Woke up this morning around 4 am took some more Nyquil 30 mL. Around 5:30 she got up to make some tea when she started to feel dizzy, like she had to go to the bathroom and that her body was numb. She doesn't remember the events after this until her  was trying to perform CPR and splash water on her face. The  reports he witness the episode and the leaned up against the wall with her right shoulder and then fell forward landing on her right side/face. Denies any tonic clonic movement. Her face appeared pale and she was limp for approximately 3 minutes. After he splash water on her face she woke up and was her normal self. Was able to get up and walk to the car to go to the hospital.    While in the ED the patient was completely unresponsive to verbal and pain, eyes closed and rolling per nursing note. After head CT started to become more responsive. In the ED, afebrile, blood pressure 123/72, heart rate 77, respirations 16, oxygen 100% on room air.  CMP unremarkable.  CBC notable for hemoglobin 8.5, MCV 67. hCG negative. Alcohol negative. Lactic acid normal. Urine drug screen pending. Given narcan in the ED with no improvement of AMS. CT head shows no acute intracranial abnormality, no skull fracture, acute minimally displaced through the left nasal bone, no fracture of the cervical spine.    Altered mental status   Syncope vs Seizure  Lip abrasion  Sounds like a syncopal type event base on the patient and husbands report of the event.  does not report seizure type activity at home and when she awoke was her normal self.  However, with her being unresponsive in the ED again and the episode report in the ED notes also considering possible seizure. No drug history or alcohol use. Patient reports Nyquil use in the past and not having any side effects. No chest pain, SOB, fever, chills. Abdominal pain, N/V.  - cardiac monitoring  - continuous pulse ox  - echocardiogram  - orthostatic vitals  - neurology consult   - urine drug screen negative   - Placed a PT consult, if moving better and more alert later today can likely discontinue     Nasal fracture, minimally displaced   - No breathing difficulties through nose, follow up with ENT outpatient   - ICE to help reduce swelling    Microcytic anemia   - baseline around 9-10         Diet:  Regular diet  DVT Prophylaxis: Ambulate every shift  Montero Catheter: Not present  Lines: None     Cardiac Monitoring: None  Code Status:  Full code     Disposition Plan hopefully tomorrow after workup above is complete and patient mental status improves    The patient's care was discussed with the Bedside Nurse, Patient, and Patient's Family.    FANY Yu PA-C  Hospitalist Service  [facility]  Securely message with [employer] (more info)  Text page via [employer] Paging/Directory     ______________________________________________________________________    Chief Complaint   Syncope     History is obtained from the patient    History of Present Illness   Lucho Lugo is a 38 year old female with PMH hx anaphylaxis reaction, microcytic anemia who presents after a possible syncopal event.    Patient took some Nyquil 30 mL last evening as she did not feel well due to sore throat. Woke up this morning around 4 am took some more Nyquil 30 mL. Around 5:30 she got up to make some tea when she started to feel dizzy, like she had to go to the bathroom and that her body was numb. She doesn't remember the events after this until her  was trying to perform CPR and splash water on her  face. The  reports he witness the episode and the leaned up against the wall with her right shoulder and then fell forward landing on her right side/face. Denies any tonic clonic movement. Her face appeared pale and she was limp for approximately 3 minutes. After he splash water on her face she woke up and was her normal self. Was able to get up and walk to the car to go to the hospital.    In the ED, afebrile, blood pressure 123/72, heart rate 77, respirations 16, oxygen 100% on room air.  CMP unremarkable.  CBC notable for hemoglobin 8.5, MCV 67.  Serum level normal.  hCG negative. Alcohol negative. Lactic acid normal. Urine drug screen pending.  CT head shows no acute intracranial abnormality, no skull fracture, acute minimally displaced through the left nasal bone, no fracture of the cervical spine.    Past Medical History    Past Medical History:   Diagnosis Date    Diabetes (H)     gestational    H/O gestational diabetes mellitus, not currently pregnant     - diet    Indication for care in labor or delivery 2016     Past Surgical History   Past Surgical History:   Procedure Laterality Date     SECTION       SECTION N/A 2016    Procedure:  SECTION;  Surgeon: Keren Cervantes DO;  Location: [facility]     SECTION N/A 2018    Procedure:  SECTION;  Repeat  Section ;  Surgeon: Elizabeth Gudino MD;  Location: [facility]    CHOLECYSTOSTOMY       Prior to Admission Medications   Prior to Admission Medications   Prescriptions Last Dose Informant Patient Reported? Taking?   EPINEPHrine (ANY BX GENERIC EQUIV) 0.3 MG/0.3ML injection 2-pack   No No   Sig: Inject 0.3 mLs (0.3 mg) into the muscle as needed for anaphylaxis   cetirizine (ZYRTEC) 10 MG tablet   No No   Sig: Take 1 tablet (10 mg) by mouth daily   diphenhydrAMINE (BENADRYL) 25 MG capsule   No No   Sig: Take 1 capsule (25 mg) by mouth every 6 hours as needed for itching or allergies    vitamin C (ASCORBIC ACID) 1000 MG TABS   Yes No   Sig: Take 1,000 mg by mouth daily      Facility-Administered Medications: None        Social History   I have reviewed this patient's social history and updated it with pertinent information if needed.  Social History     Tobacco Use    Smoking status: Never    Smokeless tobacco: Never   Vaping Use    Vaping Use: Never used   Substance Use Topics    Alcohol use: No    Drug use: No     Allergies   Allergies   Allergen Reactions    Pineapple Anaphylaxis    Orange Fruit [Citrus]     Amoxicillin Rash        Physical Exam   Vital Signs: Temp: 97.4  F (36.3  C) Temp src: Temporal BP: 123/72 Pulse: 77   Resp: 16 SpO2: 100 % O2 Device: Nasal cannula Oxygen Delivery: 2 LPM  Weight: 0 lbs 0 oz    GENERAL:  Alert, Comfortable, No acute distress. Laying in bed.   PSYCH: pleasant  HEENT:  Normocephalic, PERRLA. No scleral icterus or conjunctival injection, normal hearing, oral mucosa moist, no loose or broken teeth, nasal bridge bruise with mild swelling, no deviation. No lip laceration mild abrasion lower lip.  HEART:  Normal S1, S2 with no murmur, RRR  LUNGS:  Normal Respiratory effort. Clear to auscultation bilaterally with no wheezing, rales or ronchi.  ABDOMEN:  Soft, non-tender, non distended. No peritoneal signs.   EXTREMITIES:  No pedal edema, No cyanosis.   SKIN:  Warm, dry to touch. No rash.  NEUROLOGIC:  CN 2-12 intact, Finger to nose normal. BL 5/5 symmetric upper and lower extremity strength, sensation intact. Speech clear, alert & orientated x 4, no focal deficits.     Medical Decision Making       MANAGEMENT DISCUSSED with the following over the past 24 hours: patient, ED provider   NOTE(S)/MEDICAL RECORDS REVIEWED over the past 24 hours: labs, imaging, progress notes       Data     I have personally reviewed the following data over the past 24 hrs:    6.7  \   8.5 (L)   / 164     135 104 11.1 /  105 (H)   3.8 22 0.62 \     ALT: 19 AST: 25 AP: 61 TBILI: 0.3    ALB: 4.0 TOT PROTEIN: 7.0 LIPASE: N/A     Procal: N/A CRP: N/A Lactic Acid: 1.7         Imaging results reviewed over the past 24 hrs:   Recent Results (from the past 24 hour(s))   CT Head w/o Contrast    Narrative    EXAM: CT HEAD W/O CONTRAST, CT FACIAL BONES WITHOUT CONTRAST, CT CERVICAL SPINE W/O CONTRAST  LOCATION: [facility]  DATE: 11/3/2023    INDICATION: check for bleed,unresponsive after syncopal episode, seizure activity hit face  COMPARISON: 04/05/2022. 09/29/2021.  TECHNIQUE:   1) Routine CT Head without IV contrast. Multiplanar reformats. Dose reduction techniques were used.  2) Routine CT Facial Bones without IV contrast. Multiplanar reformats. Dose reduction techniques were used.  3) Routine CT Cervical Spine without IV contrast. Multiplanar reformats. Dose reduction techniques were used.    FINDINGS:  HEAD CT:   INTRACRANIAL CONTENTS: No intracranial hemorrhage, extraaxial collection, or mass effect.  No CT evidence of acute infarct. Normal parenchymal density for age. The ventricles and sulci are normal for age.     OSSEOUS STRUCTURES/SOFT TISSUES: No significant abnormality.     FACIAL BONE CT:  OSSEOUS STRUCTURES/SOFT TISSUES: No localized soft tissue swelling/inflammation. Acute, minimally displaced fracture of the anterior left nasal bone. No additional facial bone fracture or malalignment. No evidence for dental trauma or periapical abscess.    ORBITAL CONTENTS: No acute abnormality.    SINUSES: No paranasal sinus mucosal disease.    CERVICAL SPINE CT:   VERTEBRA: Normal vertebral body heights and alignment. No fracture or posttraumatic subluxation. Multilevel mild cervical spondylosis.    CANAL/FORAMINA: No canal or neural foraminal stenosis.    PARASPINAL: No extraspinal abnormality. Visualized lung fields are clear.      Impression    IMPRESSION:  HEAD CT:  1.  No acute intracranial abnormality.  2.  No calvarial or skull base fracture.    FACIAL BONE CT:  1.  Acute,  minimally displaced fracture of the left nasal bone.  2.  No additional maxillofacial mandibular fracture.    CERVICAL SPINE CT:  1.  No fracture or posttraumatic subluxation.  2.  No high-grade spinal canal or neural foraminal stenosis.   CT Facial Bones without Contrast    Narrative    EXAM: CT HEAD W/O CONTRAST, CT FACIAL BONES WITHOUT CONTRAST, CT CERVICAL SPINE W/O CONTRAST  LOCATION: [facility]  DATE: 11/3/2023    INDICATION: check for bleed,unresponsive after syncopal episode, seizure activity hit face  COMPARISON: 04/05/2022. 09/29/2021.  TECHNIQUE:   1) Routine CT Head without IV contrast. Multiplanar reformats. Dose reduction techniques were used.  2) Routine CT Facial Bones without IV contrast. Multiplanar reformats. Dose reduction techniques were used.  3) Routine CT Cervical Spine without IV contrast. Multiplanar reformats. Dose reduction techniques were used.    FINDINGS:  HEAD CT:   INTRACRANIAL CONTENTS: No intracranial hemorrhage, extraaxial collection, or mass effect.  No CT evidence of acute infarct. Normal parenchymal density for age. The ventricles and sulci are normal for age.     OSSEOUS STRUCTURES/SOFT TISSUES: No significant abnormality.     FACIAL BONE CT:  OSSEOUS STRUCTURES/SOFT TISSUES: No localized soft tissue swelling/inflammation. Acute, minimally displaced fracture of the anterior left nasal bone. No additional facial bone fracture or malalignment. No evidence for dental trauma or periapical abscess.    ORBITAL CONTENTS: No acute abnormality.    SINUSES: No paranasal sinus mucosal disease.    CERVICAL SPINE CT:   VERTEBRA: Normal vertebral body heights and alignment. No fracture or posttraumatic subluxation. Multilevel mild cervical spondylosis.    CANAL/FORAMINA: No canal or neural foraminal stenosis.    PARASPINAL: No extraspinal abnormality. Visualized lung fields are clear.      Impression    IMPRESSION:  HEAD CT:  1.  No acute intracranial abnormality.  2.   No calvarial or skull base fracture.    FACIAL BONE CT:  1.  Acute, minimally displaced fracture of the left nasal bone.  2.  No additional maxillofacial mandibular fracture.    CERVICAL SPINE CT:  1.  No fracture or posttraumatic subluxation.  2.  No high-grade spinal canal or neural foraminal stenosis.   CT Cervical Spine w/o Contrast    Narrative    EXAM: CT HEAD W/O CONTRAST, CT FACIAL BONES WITHOUT CONTRAST, CT CERVICAL SPINE W/O CONTRAST  LOCATION: [facility]  DATE: 11/3/2023    INDICATION: check for bleed,unresponsive after syncopal episode, seizure activity hit face  COMPARISON: 04/05/2022. 09/29/2021.  TECHNIQUE:   1) Routine CT Head without IV contrast. Multiplanar reformats. Dose reduction techniques were used.  2) Routine CT Facial Bones without IV contrast. Multiplanar reformats. Dose reduction techniques were used.  3) Routine CT Cervical Spine without IV contrast. Multiplanar reformats. Dose reduction techniques were used.    FINDINGS:  HEAD CT:   INTRACRANIAL CONTENTS: No intracranial hemorrhage, extraaxial collection, or mass effect.  No CT evidence of acute infarct. Normal parenchymal density for age. The ventricles and sulci are normal for age.     OSSEOUS STRUCTURES/SOFT TISSUES: No significant abnormality.     FACIAL BONE CT:  OSSEOUS STRUCTURES/SOFT TISSUES: No localized soft tissue swelling/inflammation. Acute, minimally displaced fracture of the anterior left nasal bone. No additional facial bone fracture or malalignment. No evidence for dental trauma or periapical abscess.    ORBITAL CONTENTS: No acute abnormality.    SINUSES: No paranasal sinus mucosal disease.    CERVICAL SPINE CT:   VERTEBRA: Normal vertebral body heights and alignment. No fracture or posttraumatic subluxation. Multilevel mild cervical spondylosis.    CANAL/FORAMINA: No canal or neural foraminal stenosis.    PARASPINAL: No extraspinal abnormality. Visualized lung fields are clear.      Impression     IMPRESSION:  HEAD CT:  1.  No acute intracranial abnormality.  2.  No calvarial or skull base fracture.    FACIAL BONE CT:  1.  Acute, minimally displaced fracture of the left nasal bone.  2.  No additional maxillofacial mandibular fracture.    CERVICAL SPINE CT:  1.  No fracture or posttraumatic subluxation.  2.  No high-grade spinal canal or neural foraminal stenosis.

## 2023-11-03 NOTE — PROGRESS NOTES
ROOM # 221    Living Situation (if not independent, order SW consult): Home  Facility name:  : [facility]    Activity level at baseline: Independent  Activity level on admit: X 1 assist      Patient registered to observation; given Patient Bill of Rights; given the opportunity to ask questions about observation status and their plan of care.  Patient has been oriented to the observation room, bathroom and call light is in place.    Discussed discharge goals and expectations with patient/family.

## 2023-11-03 NOTE — CONSULTS
"HOSPITAL NEUROLOGY      History of the Present Illness:    38 year old female presented to the ED this morning.  She had started to feel unwell the previous evening and had been taking Nyquil.  She awoke this morning around 5AM and took some more Nyquil and was standing in the kitchen when she collapsed and fell on her face.  Family witnessed seizure-like activity (this is unclear).  She continued to be altered in the ER and so was admitted for observation.      She says that 4AM she had Tylenol and Nyquil, at 5:30 her alarm woke her up and she went downstairs.  She was trying to make her normal coffee, she felt dizzy, she went to urinate but shortly after getting up, she apparently lost consciousness.  She woke up to her  performing chest compressions on her.  She denies any postictal symptoms, recalling telling her  not to call 911 and states that she remembers everything about going to the hospital and beyond.  (Though notes from the ER report she had an episode where she was entirely unresponsive).      Her current complaints primarily involve the injury to her nose and right shoulder pain.  She bit her lower lip, no biting of her tongue.  No apparent incontinence.      No weakness or numbness (though states that her teeth feel \"like sand\").      Generally she has been feeling well.  Though last night, she apparently did experience some vague malaise and took Nyquil before going to bed.          Past records reviewed    9/29/2021 - admission - presenting with unresponsiveness, cause remained unclear, had to be treated for post-LP headache with blood patch.  Has history of anaphylactic reactions, though no signs of swelling to suggest this currently    8/20/2016 - ED encounter - patient taking cough medicine and 2 Tylenol with codeine for tooth pain - had to lie down and became diaphoretic and \"almost lose consciousness\" with attempts to sit up    6/29/2015 - ED encounter - eating in food court, " "felt lightheaded and had to lay down, briefly lost consciousness, has history of syncopal episodes (during pregnancy and \"2-3x per year normally during heavy menstrual cramping/flow\"      Past Medical History:   Diagnosis Date    Diabetes (H)     gestational    H/O gestational diabetes mellitus, not currently pregnant     - diet    Indication for care in labor or delivery 2016    Syncope and collapse 11/3/2023        Past Surgical History:   Procedure Laterality Date     SECTION       SECTION N/A 2016    Procedure:  SECTION;  Surgeon: Keren Cervantes DO;  Location: [facility]     SECTION N/A 2018    Procedure:  SECTION;  Repeat  Section ;  Surgeon: Elizabeth Gudino MD;  Location: [facility]    CHOLECYSTOSTOMY        Social History     Tobacco Use    Smoking status: Never    Smokeless tobacco: Never   Vaping Use    Vaping Use: Never used   Substance Use Topics    Alcohol use: No    Drug use: No            Current Facility-Administered Medications:     acetaminophen (TYLENOL) tablet 650 mg, 650 mg, Oral, Q6H PRN, 650 mg at 23 1428 **OR** acetaminophen (TYLENOL) Suppository 650 mg, 650 mg, Rectal, Q6H PRN, Rossy Yu PA-C    ibuprofen (ADVIL/MOTRIN) tablet 600 mg, 600 mg, Oral, Q6H PRN, Rossy Yu PA-C    lidocaine (LMX4) cream, , Topical, Q1H PRN, Rossy Yu PA-C    lidocaine 1 % 0.1-1 mL, 0.1-1 mL, Other, Q1H PRN, Rossy Yu PA-C    melatonin tablet 1 mg, 1 mg, Oral, At Bedtime PRN, Rossy Yu PA-C    ondansetron (ZOFRAN ODT) ODT tab 4 mg, 4 mg, Oral, Q6H PRN **OR** ondansetron (ZOFRAN) injection 4 mg, 4 mg, Intravenous, Q6H PRN, Rossy Yu PA-C    senna-docusate (SENOKOT-S/PERICOLACE) 8.6-50 MG per tablet 1 tablet, 1 tablet, Oral, BID PRN **OR** senna-docusate (SENOKOT-S/PERICOLACE) 8.6-50 MG per tablet 2 tablet, 2 tablet, Oral, BID PRN, Rossy Yu, SAGE    sodium chloride (PF) 0.9% PF flush 3 mL, 3 mL, " Intracatheter, q1 min prn, Rossy Yu PA-C    sodium chloride (PF) 0.9% PF flush 3 mL, 3 mL, Intracatheter, Q8H, Rossy Yu PA-C    Allergies:  Allergies   Allergen Reactions    Pineapple Anaphylaxis    Orange Fruit [Citrus]     Amoxicillin Rash       Physical Examination:     /70   Pulse 67   Temp 97.4  F (36.3  C) (Temporal)   Resp 18   SpO2 100%     There is no height or weight on file to calculate BMI.    NEUROLOGICAL:   MENTAL STATUS:   Alert and oriented. Thought process and content unremarkable. Follows commands appropriately. Speech fluent.     CN:   II: Visual fields intact. PERRLA.   III, IV, VI: EOMI.    V: Symmetric facial sensation to light touch.   VII: Face symmetric.   VIII: Hearing symmetric. No nystagmus.   IX, X: Symmetric palatal rise.   XI: Symmetric shoulder shrug.   XII: Tongue midline with symmetric movements.     MOTOR:   Normal tone. Normal bulk. No tremor.         RIGHT UE: LEFT UE   Deltoid              5/5             5/5   Biceps              5/5              5/5   Triceps             5/5              5/5   Wrist Ext           5/5             5/5  Finger abd        5/5             5/5             RIGHT LE: LEFT LE:   HF     5/5            5/5   KF                     5/5           5/5   KE     5/5            5/5   PF                      5/5            5/5   DF                     5/5             5/5     REFLEXES:       RIGHT:                LEFT:   Biceps        2/4                      2/4   Brachioradialis      2/4                      2/4   Triceps                  2/4                      2/4   Patellar                 2/4                      2/4   Achilles                 2/4                      2/4      SENSATION:   Light touch intact and symmetric.     CEREBELLAR:   Normal F-N-F. Normal H-S. No dysmetria. No nystagmus.     GAIT:   deferred       Review of Diagnostics:  I personally reviewed and interpreted the followin/03/23 06:28   Sodium 135  "  Potassium 3.8   Chloride 104   Carbon Dioxide (CO2) 22   Urea Nitrogen 11.1   Creatinine 0.62   GFR Estimate >90   Calcium 8.8   Anion Gap 9   Albumin 4.0   Protein Total 7.0   Alkaline Phosphatase 61   ALT 19   AST 25   Bilirubin Total 0.3   Glucose 105 (H)         Complete Blood Count:    Recent Labs   Lab Test 11/03/23  0649 04/05/22  0925 01/13/22  1717 10/07/21  2255   WBC 6.7 13.4* 5.0 7.2   RBC 4.54 5.27* 5.15 4.73   HGB 8.5* 10.2* 10.6* 10.4*   HCT 30.2* 37.0 38.3 34.9*    354 177 238   LYMPH 19 40  --  30        HgA1c: No results found for: \"A1C\"     Thyroid Stimulating Hormone:   Lab Results   Component Value Date    TSH 0.82 04/05/2022    TSH 0.29 02/15/2021        CT Head   images reviewed personally - unremarkable scan  IMPRESSION:  HEAD CT:  1.  No acute intracranial abnormality.  2.  No calvarial or skull base fracture.     FACIAL BONE CT:  1.  Acute, minimally displaced fracture of the left nasal bone.  2.  No additional maxillofacial mandibular fracture.     CERVICAL SPINE CT:  1.  No fracture or posttraumatic subluxation.  2.  No high-grade spinal canal or neural foraminal stenosis.      Impression:  Ms. Carter Lugo is a 38 year old female admitted with an episode of loss of consciousness.  In general, the preponderance of evidence seems to point to this being syncope.  The description of pallor, the lightheadedness, the loss of consciousness immediately after getting up from urinating, these are all characteristic features of vasovagal syncope.  And reviewing past notes, she has some history of these types of events.      That being said, the documented events in the ER when she was unresponsive for a period of time is unusual (perhaps having something to do with her taking Nyquil?).  And she has absolutely no memory of prodromal symptoms or waking up from this unresponsiveness in the ER (as opposed to the event at home).  I think seizure, while less likely, is not excluded.  Thus, I " am ordering an MRI.  An EEG will be needed as well, though heading into the weekend, this can also be performed as an outpatient while sleep deprived to improve yield.        Recommendations:  - Most likely vasovagal syncope, but seizure not excluded and needs to be a serious consideration given the potential for life threatening implications   Will order MRI, if unremarkable, no further inpatient interventions anticipated from a neuro perspective  - I discussed with her no driving or operating heavy machinery for now  - Follow up with neurology as outpatient in 2-4 weeks        Tomás Kasper MD (general neurology)  Pgr [phone]    1. Altered mental status, unspecified altered mental status type    2. Syncope and collapse    3. Seizure-like activity (H)    4. Closed fracture of nasal bone, initial encounter    5. Lip laceration, initial encounter

## 2023-11-03 NOTE — ED NOTES
Pt received from triage. Completely unresponsive to verbal and pain, eyes closed and rolling. MD immediately called to the room for assessment, red team activated. Spinal collar in placed for protection. Airway intact and placed on O2 for comfort on 2 L. Sat 100%

## 2023-11-03 NOTE — ED PROVIDER NOTES
History     Chief Complaint:  Altered Mental Status and Fall       HPI   Lucho Lugo is a 38 year old female who presents to the ED for an evaluation of altered mental status of a fall.  reports she started feeling a little sick last night after getting off from work as a . She had no complaints of severe pain, just nauseas.The patient took some Nyquil and went to bed at around 11 pm. This morning, she woke up at around 5 am to get ready for work. She took some more Nyquil and as she was standing in the kitchen boiling some water, she passed out facing forward and striking her face onto the floor. Family member reports she has a seizure of about 2 minutes along with some shakiness. Upon arrival, [patient was responding and able to communicate up until she reached the ED room. Denies drug use or alcohol use. No health problems just allergies and lastly, no coughing or headaches.     Independent Historian:   Family member as noted above.     Review of External Notes:   I reviewed her chart in epic. She had a similar unresponsive episode after and on 22 in which she was admitted to the ICU due to the unresponsive. She did respond to narcan that she was given at bedtime.     Medications:    Cetirizine   Diphenhydramine   Epinephrine   Diclofenac     Past Medical History:    GDM  HRC  Syncope and collapse   Hypokalemia   Altered mental status   Gestational thrombocytopenia     Past Surgical History:     section x2  Cholecystectomy      Physical Exam   Patient Vitals for the past 24 hrs:   BP Temp Temp src Pulse Resp SpO2   23 0610 115/72 97.4  F (36.3  C) Temporal 67 16 99 %        Physical Exam  Nursing note and vitals reviewed.  Constitutional:  Appears well-developed and well-nourished.   HENT:   Head:    Aberration to the lower lip. Bruising and slight swelling to the bridge of the nose. No septal hematoma or deviation. Remainder of the facial bones are not  tendered.   Mouth/Throat:   Oropharynx is clear and moist. Teeth are intact, no laceration to the tongue. No oropharyngeal exudate.   Eyes:    Pupils are equal, round, and reactive to light.   Neck:    Normal range of motion. Neck supple.      No tracheal deviation present. No thyromegaly present.   Cardiovascular:  Normal rate, regular rhythm, no murmur   Pulmonary/Chest: Breath sounds are clear and equal without wheezes or crackles.  Abdominal:   Soft. Bowel sounds are normal. Exhibits no distension and      no mass. There is no tenderness.      There is no rebound and no guarding.   Musculoskeletal:  Exhibits no edema.   Lymphadenopathy:  No cervical adenopathy.   Neurological:   Alert and oriented to person, place, and time.   Skin:    Skin is warm and dry. No rash noted. No pallor.       Emergency Department Course   ECG  ECG taken at 0619, ECG read at 0714  Normal sinus rhythm   Septal infarct, age undetermined   Abnormal ECG   No change as compared to prior, dated 11/03/23.  Rate 66 bpm. VT interval 156 ms. QRS duration 82 ms. QT/QTc 428/448 ms. P-R-T axes 69 85 58.     Imaging:  CT Cervical Spine w/o Contrast   Final Result   IMPRESSION:   HEAD CT:   1.  No acute intracranial abnormality.   2.  No calvarial or skull base fracture.      FACIAL BONE CT:   1.  Acute, minimally displaced fracture of the left nasal bone.   2.  No additional maxillofacial mandibular fracture.      CERVICAL SPINE CT:   1.  No fracture or posttraumatic subluxation.   2.  No high-grade spinal canal or neural foraminal stenosis.      CT Facial Bones without Contrast   Final Result   IMPRESSION:   HEAD CT:   1.  No acute intracranial abnormality.   2.  No calvarial or skull base fracture.      FACIAL BONE CT:   1.  Acute, minimally displaced fracture of the left nasal bone.   2.  No additional maxillofacial mandibular fracture.      CERVICAL SPINE CT:   1.  No fracture or posttraumatic subluxation.   2.  No high-grade spinal canal or  neural foraminal stenosis.      CT Head w/o Contrast   Final Result   IMPRESSION:   HEAD CT:   1.  No acute intracranial abnormality.   2.  No calvarial or skull base fracture.      FACIAL BONE CT:   1.  Acute, minimally displaced fracture of the left nasal bone.   2.  No additional maxillofacial mandibular fracture.      CERVICAL SPINE CT:   1.  No fracture or posttraumatic subluxation.   2.  No high-grade spinal canal or neural foraminal stenosis.             Laboratory:  Labs Ordered and Resulted from Time of ED Arrival to Time of ED Departure   COMPREHENSIVE METABOLIC PANEL - Abnormal       Result Value    Sodium 135      Potassium 3.8      Carbon Dioxide (CO2) 22      Anion Gap 9      Urea Nitrogen 11.1      Creatinine 0.62      GFR Estimate >90      Calcium 8.8      Chloride 104      Glucose 105 (*)     Alkaline Phosphatase 61      AST 25      ALT 19      Protein Total 7.0      Albumin 4.0      Bilirubin Total 0.3     CBC WITH PLATELETS AND DIFFERENTIAL - Abnormal    WBC Count 6.7      RBC Count 4.54      Hemoglobin 8.5 (*)     Hematocrit 30.2 (*)     MCV 67 (*)     MCH 18.7 (*)     MCHC 28.1 (*)     RDW 19.5 (*)     Platelet Count 164      % Neutrophils 69      % Lymphocytes 19      % Monocytes 11      % Eosinophils 0      % Basophils 0      % Immature Granulocytes 1      NRBCs per 100 WBC 0      Absolute Neutrophils 4.6      Absolute Lymphocytes 1.3      Absolute Monocytes 0.8      Absolute Eosinophils 0.0      Absolute Basophils 0.0      Absolute Immature Granulocytes 0.0      Absolute NRBCs 0.0     ISTAT GASES LACTATE VENOUS POCT - Abnormal    Lactic Acid POCT 1.7      Bicarbonate Venous POCT 21      O2 Sat, Venous POCT 64 (*)     pCO2 Venous POCT 41      pH Venous POCT 7.32      pO2 Venous POCT 35     RBC AND PLATELET MORPHOLOGY - Abnormal    Platelet Assessment        Value: Automated Count Confirmed. Platelet morphology is normal.    Acanthocytes        Shanti Rods        Basophilic Stippling        Bite  Cells        Blister Cells        McDonald Cells        Elliptocytes Slight (*)     Hgb C Crystals        Pathak-Jolly Bodies        Hypersegmented Neutrophils        Polychromasia        RBC agglutination        RBC Fragments Slight (*)     Reactive Lymphocytes        Rouleaux        Sickle Cells        Smudge Cells        Spherocytes        Stomatocytes        Target Cells        Teardrop Cells        Toxic Neutrophils        RBC Morphology Confirmed RBC Indices     HCG QUALITATIVE PREGNANCY - Normal    hCG Serum Qualitative Negative     ETHYL ALCOHOL LEVEL - Normal    Alcohol ethyl <0.01     GLUCOSE BY METER - Normal    GLUCOSE BY METER POCT 96     ACETAMINOPHEN LEVEL - Normal    Acetaminophen 13.2     INFLUENZA A/B, RSV, & SARS-COV2 PCR - Normal    Influenza A PCR Negative      Influenza B PCR Negative      RSV PCR Negative      SARS CoV2 PCR Negative     URINE DRUG SCREEN PANEL - Normal    Amphetamines Urine Screen Negative      Barbituates Urine Screen Negative      Benzodiazepine Urine Screen Negative      Cannabinoids Urine Screen Negative      Cocaine Urine Screen Negative      Fentanyl Qual Urine Screen Negative      Opiates Urine Screen Negative      PCP Urine Screen Negative     GLUCOSE MONITOR NURSING POCT   TYPE AND SCREEN, ADULT    ABO/RH(D) A POS      Antibody Screen Negative      SPECIMEN EXPIRATION DATE [id number]     ABO/RH TYPE AND SCREEN        Procedures   Cervical collar    Emergency Department Course & Assessments:    Interventions:  Medications   sodium chloride 0.9% BOLUS 1,000 mL (0 mLs Intravenous Stopped 11/3/23 0735)   naloxone (NARCAN) nasal spray 4 mg (4 mg Alternating Nostrils $Given 11/3/23 0617)        Assessments:  0613 I obtained history and examined the patient as noted above.   0710 I rechecked and updated the patient. She's wake with her eyes open. She's not speaking but when I asked any areas of pain, she points to her lip. She's moving all extremities.      Independent  Interpretation (X-rays, CTs, rhythm strip):  Looked at head CT. No signs of intercranial bleeding.     Consultations/Discussion of Management or Tests:  0755  I spoke with Rossy Yu of the hospitalist service regarding admission.     Social Determinants of Health affecting care:   Healthcare Access/Compliance    Disposition:  The patient was admitted to the hospital under the care of Dr. Kirk Rojas and Rossy Yu PA.     Impression & Plan        Medical Decision Making:  This patient arrives due to altered mental status after a syncopal episode in which she had 2 minutes in which she had a 2-minute long period of seizure-like activity.  The patient sustained facial trauma with a nasal fracture after she had passed out and fallen onto her face.  She has a nonsuturable lower lip abrasion/laceration also but no other signs of traumatic injury.  There was no sign of intracranial bleed or skull fracture.  No sign of cervical spine fracture.  Her mental status did not respond to Narcan however she gradually aroused and became more alert, although she remained with some altered mental status and was not speaking fluently.  She was not having any stroke symptoms.  There was no sign of meningitis at this time.  She is mildly anemic which is not significantly changed from her baseline but no signs of active bleeding.  The exact cause of her altered mental status is unclear but this could be due to a postictal period, therefore she was admitted to the hospital for further evaluation and treatment.  She does not have any prior history of seizures so antiseizure medication was not started at this time.  She does have a prior history of altered mental status 1.5 years ago when she was admitted for altered mental status after what was thought to be an anaphylactic reaction to Amoxacillin.    Critical Care time was 40 minutes for this patient excluding procedures.      Diagnosis:    ICD-10-CM    1. Altered mental status,  unspecified altered mental status type  R41.82       2. Syncope and collapse  R55       3. Seizure-like activity (H)  R56.9       4. Closed fracture of nasal bone, initial encounter  S02.2XXA       5. Lip laceration, initial encounter  S01.511A            Discharge Medications:  New Prescriptions    No medications on file        Scribe Disclosure:  Theresa ARMSTRONG, am serving as a scribe at 6:47 AM on 11/3/2023 to document services personally performed by Basilia Trejo MD based on my observations and the provider's statements to me.     11/3/2023   Basilia Trejo MD [place], Cheri Lee, MD  11/03/23 0920       Basilia Trejo MD  11/03/23 1310

## 2023-11-04 ENCOUNTER — APPOINTMENT (OUTPATIENT)
Dept: MRI IMAGING | Facility: CLINIC | Age: 38
End: 2023-11-04
Attending: PSYCHIATRY & NEUROLOGY
Payer: COMMERCIAL

## 2023-11-04 VITALS
TEMPERATURE: 98.9 F | HEART RATE: 81 BPM | SYSTOLIC BLOOD PRESSURE: 124 MMHG | RESPIRATION RATE: 16 BRPM | OXYGEN SATURATION: 100 % | DIASTOLIC BLOOD PRESSURE: 77 MMHG

## 2023-11-04 LAB
ANION GAP SERPL CALCULATED.3IONS-SCNC: 7 MMOL/L (ref 7–15)
BUN SERPL-MCNC: 11.5 MG/DL (ref 6–20)
CALCIUM SERPL-MCNC: 8.7 MG/DL (ref 8.6–10)
CHLORIDE SERPL-SCNC: 105 MMOL/L (ref 98–107)
CREAT SERPL-MCNC: 0.62 MG/DL (ref 0.51–0.95)
DEPRECATED HCO3 PLAS-SCNC: 22 MMOL/L (ref 22–29)
EGFRCR SERPLBLD CKD-EPI 2021: >90 ML/MIN/1.73M2
ERYTHROCYTE [DISTWIDTH] IN BLOOD BY AUTOMATED COUNT: 19.9 % (ref 10–15)
GLUCOSE SERPL-MCNC: 95 MG/DL (ref 70–99)
HCT VFR BLD AUTO: 33.5 % (ref 35–47)
HGB BLD-MCNC: 9.2 G/DL (ref 11.7–15.7)
MCH RBC QN AUTO: 18.8 PG (ref 26.5–33)
MCHC RBC AUTO-ENTMCNC: 27.5 G/DL (ref 31.5–36.5)
MCV RBC AUTO: 69 FL (ref 78–100)
PLATELET # BLD AUTO: 182 10E3/UL (ref 150–450)
POTASSIUM SERPL-SCNC: 4 MMOL/L (ref 3.4–5.3)
RBC # BLD AUTO: 4.89 10E6/UL (ref 3.8–5.2)
SODIUM SERPL-SCNC: 134 MMOL/L (ref 135–145)
WBC # BLD AUTO: 5.9 10E3/UL (ref 4–11)

## 2023-11-04 PROCEDURE — A9585 GADOBUTROL INJECTION: HCPCS | Performed by: HOSPITALIST

## 2023-11-04 PROCEDURE — 70553 MRI BRAIN STEM W/O & W/DYE: CPT

## 2023-11-04 PROCEDURE — 99239 HOSP IP/OBS DSCHRG MGMT >30: CPT | Performed by: INTERNAL MEDICINE

## 2023-11-04 PROCEDURE — G0378 HOSPITAL OBSERVATION PER HR: HCPCS

## 2023-11-04 PROCEDURE — 36415 COLL VENOUS BLD VENIPUNCTURE: CPT

## 2023-11-04 PROCEDURE — 85027 COMPLETE CBC AUTOMATED: CPT

## 2023-11-04 PROCEDURE — 255N000002 HC RX 255 OP 636: Performed by: HOSPITALIST

## 2023-11-04 PROCEDURE — 999N000147 HC STATISTIC PT IP EVAL DEFER

## 2023-11-04 PROCEDURE — 80048 BASIC METABOLIC PNL TOTAL CA: CPT

## 2023-11-04 RX ORDER — GADOBUTROL 604.72 MG/ML
5 INJECTION INTRAVENOUS ONCE
Status: COMPLETED | OUTPATIENT
Start: 2023-11-04 | End: 2023-11-04

## 2023-11-04 RX ADMIN — GADOBUTROL 5 ML: 604.72 INJECTION INTRAVENOUS at 07:40

## 2023-11-04 ASSESSMENT — ACTIVITIES OF DAILY LIVING (ADL)
ADLS_ACUITY_SCORE: 31

## 2023-11-04 NOTE — PROGRESS NOTES
Patient feels good, no new events per her and nursing, wants to go home        MRI Brain without and with Contrast   IMPRESSION:  1.  No acute infarct, mass, hemorrhage, or herniation.  2.  Small nonspecific white matter lesion in the left frontal lobe likely due to chronic microvascular ischemic change.        I had a long discussion with Lucho about a variety of issues.  I showed her the images of her brain MRI.  The incidental very small white matter lesion is of no real consequence in my opinion, we discussed that I see non-specific white matter findings like this more often than I don't.  We talked a little more about her symptoms.  Apparently, she has more consistent orthostatic symptoms than I realized yesterday.  She states that 2-3 times a week she will feel lightheaded in situations when she has to get up from a crouching position.  She has to be very careful with bending over.      Orthostatic vitals were checked without evidence for orthostatic hypotension.  I assume her symptoms relate to a borderline lower blood pressure coupled with anemia.  She recounts a history of anemia and states she has tried multiple iron supplements but was never able to tolerate them.  I do not have an answer for this, she will have to work with her primary doctor.  But this is important to address because it will worsen these orthostatic symptoms.     We discussed the importance of adequate hydration and even to be liberal with sodium intake.  It sounds like she already does this.  If orthostatic symptoms are frequent enough, we could consider starting an agent such as midodrine.  Though it would be preferable if symptoms could be ameliorated with addressing her anemia instead.      - Follow up with neurology as outpatient within a month of discharge  - Follow up with primary doctor to discuss anemia       I spent 45 minutes on the date of encounter with the patient and before and after the visit on activities detailed in  the above note which may include reviewing the EMR, documenting clinical information, and communicating with other health care professionals.        Tomás Kasper MD (general neurology)  Pgr [phone]

## 2023-11-04 NOTE — PLAN OF CARE
PRIMARY DIAGNOSIS: SYNCOPE  OUTPATIENT/OBSERVATION GOALS TO BE MET BEFORE DISCHARGE:  /63 (BP Location: Left arm)   Pulse 90   Temp 98.5  F (36.9  C) (Oral)   Resp 16   SpO2 100%    ADLs back to baseline: Yes    Activity and level of assistance: Up with standby assistance.    Pain status: Improved-controlled with oral pain medications.    Return to near baseline physical activity: Yes     Discharge Planner Nurse   Safe discharge environment identified: Yes  Barriers to discharge: Yes       Entered by: Brian Silva RN    Pt A&Ox4. VSS on RA. Pt noted pain to right shoulder and face. Denied any need for pain meds. Tele in place running SR 60s. Will continue with POC.   Please review provider order for any additional goals.   Nurse to notify provider when observation goals have been met and patient is ready for discharge.

## 2023-11-04 NOTE — PLAN OF CARE
PRIMARY DIAGNOSIS: SYNCOPE  OUTPATIENT/OBSERVATION GOALS TO BE MET BEFORE DISCHARGE:  1. Orthostatic performed: Yes:          Lying Orthostatic BP: 113/73         Sitting Orthostatic BP: 116/71         Standing Orthostatic BP: 111/78     2. Diagnostic testing complete & at baseline neurologic testing: Yes    3. Cleared by consultants (if involved): No    4. Interpretation of cardiac rhythm per telemetry tech: SR    5. Tolerating adequate PO diet and medications: Yes    6. Return to near baseline physical activity or neurologic status: Yes    Discharge Planner Nurse   Safe discharge environment identified: Yes  Barriers to discharge: Yes       Entered by: Shannan Granger RN 11/04/2023      /77 (BP Location: Right arm)   Pulse 81   Temp 98.9  F (37.2  C) (Oral)   Resp 16   SpO2 100%   Patient is A&Ox4 VSS on RA. Patient complains of pain to face from where she fell. Refused pain meds. Tele in place- SR. MRI completed this morning. Neurology to see patient before discharge. OP cardiac monitoring ordered upon discharge. Will discharge later this evening.    Please review provider order for any additional goals.   Nurse to notify provider when observation goals have been met and patient is ready for discharge.

## 2023-11-04 NOTE — PLAN OF CARE
PRIMARY DIAGNOSIS: SYNCOPE  OUTPATIENT/OBSERVATION GOALS TO BE MET BEFORE DISCHARGE:  /63 (BP Location: Left arm)   Pulse 90   Temp 98.5  F (36.9  C) (Oral)   Resp 16   SpO2 100%    ADLs back to baseline: Yes    Activity and level of assistance: Up with standby assistance.    Pain status: Improved-controlled with oral pain medications.    Return to near baseline physical activity: Yes     Discharge Planner Nurse   Safe discharge environment identified: Yes  Barriers to discharge: Yes       Entered by: Brian Silva RN    Pt A&Ox4. VSS on RA. Pt noted pain to right shoulder and face. Denied any need for pain meds. Tele in place running SR 60s. Plan- neurology consulted, MRI.   Please review provider order for any additional goals.   Nurse to notify provider when observation goals have been met and patient is ready for discharge.

## 2023-11-04 NOTE — PLAN OF CARE
PRIMARY DIAGNOSIS: SYNCOPE  OUTPATIENT/OBSERVATION GOALS TO BE MET BEFORE DISCHARGE:  1. Orthostatic performed: Yes:          Lying Orthostatic BP: 113/73         Sitting Orthostatic BP: 116/71         Standing Orthostatic BP: 111/78     2. Diagnostic testing complete & at baseline neurologic testing: Yes    3. Cleared by consultants (if involved): No    4. Interpretation of cardiac rhythm per telemetry tech: SR    5. Tolerating adequate PO diet and medications: Yes    6. Return to near baseline physical activity or neurologic status: Yes    Discharge Planner Nurse   Safe discharge environment identified: Yes  Barriers to discharge: Yes       Entered by: Shannan Granger RN 11/04/2023      /68 (BP Location: Right arm)   Pulse 77   Temp 98.8  F (37.1  C) (Oral)   Resp 16   SpO2 98%   Patient is A&Ox4 VSS on RA. Patient complains of pain to face from where she fell. Refused pain meds. Tele in place- SR. MRI completed this morning    Please review provider order for any additional goals.   Nurse to notify provider when observation goals have been met and patient is ready for discharge.

## 2023-11-04 NOTE — PROVIDER NOTIFICATION
MD Notification    Notified Person: MD    Notified Person Name: Hugo Hernandez    Notification Date/Time: 11/4/23 1650    Notification Interaction: vocera message    Purpose of Notification: patient wondering about discharge timing, PT and neurology has seen patient     Orders Received: MD will look over chart    Comments:   
MD Notification    Notified Person: MD    Notified Person Name: Saud    Notification Date/Time: 11/4/23 1248    Notification Interaction: vocera message    Purpose of Notification: patient orthostatics negative, wondering if she will be discharged today    Orders Received: waiting on neurology; paged neurology to provider cell phone     Comments:   
Paged on call neurology to provider cell phone per request.   
initiation of breastfeeding/breast milk feeding

## 2023-11-04 NOTE — PLAN OF CARE
PRIMARY DIAGNOSIS: SYNCOPE  OUTPATIENT/OBSERVATION GOALS TO BE MET BEFORE DISCHARGE:  /77 (BP Location: Right arm)   Pulse 73   Temp 98.7  F (37.1  C) (Oral)   Resp 18   SpO2 100%    ADLs back to baseline: Yes    Activity and level of assistance: Up with standby assistance.    Pain status: Improved-controlled with oral pain medications.    Return to near baseline physical activity: Yes     Discharge Planner Nurse   Safe discharge environment identified: Yes  Barriers to discharge: Yes       Entered by: Brian Silva RN    Pt A&Ox4. VSS on RA. Pt noted pain on right shoulder and face. Denied any need for pain meds. Tele in place running SR 65. Will continue with POC.   Please review provider order for any additional goals.   Nurse to notify provider when observation goals have been met and patient is ready for discharge.

## 2023-11-04 NOTE — DISCHARGE SUMMARY
[facility]  Discharge Summary    Admit date:  11/3/2023    Discharge date and time: No discharge date for patient encounter.    Discharge Physician: Anup Arias MD    Primary care provider: [facility]    Primary Discharge Diagnosis      Syncope    Secondary Diagnoses     Medication Side Effect  Acute Metabolic Encephalopathy  Iron Deficiency Anemia  Abnormal Brain MRI  Nasal Fracture    Summary of Hospital Stay     38F with hx of adverse syncopal events to medications and iron deficiency anemia presents with syncope after taking Nyquil. Patient had a syncopal event while standing after taking Nyquil resulting in a fall and injury to her face. Patient was somewhat altered in the ED but returned to her baseline by the time she was admitted and had no further syncopal episodes. MRI of the brain was performed with small white matter lesion in left frontal lobe but reviewed by neurology and thought to be variant of normal. Noted to be iron deficient (chronic for patient; declined IV iron due to adverse med rxns in the past and cannot tolerate oral iron; told to increase dietary intake), which could contribute to symptoms. Will set patient up with a Zio patch and have her follow-up with neurology and PCP after hospital stay.    Patient Discharge Condition & Exam     Discharge condition: Improved    /77 (BP Location: Right arm)   Pulse 81   Temp 98.9  F (37.2  C) (Oral)   Resp 16   SpO2 100%      General: In NAD.  Cardiac: RRR.  Lungs: CTAB.  Abd: Non-tender.  Ext: No edema.    Discharge Instructions     Patient/family instructions: Written discharge instruction given to patient/family    Discharge Medications:     Review of your medicines        CONTINUE these medicines which may have CHANGED, or have new prescriptions. If we are uncertain of the size of tablets/capsules you have at home, strength may be listed as something that might have changed.        Dose /  Directions   cetirizine 10 MG tablet  Commonly known as: zyrTEC  This may have changed:   when to take this  reasons to take this  Used for: Anaphylaxis, initial encounter      Dose: 10 mg  Take 1 tablet (10 mg) by mouth daily  Quantity: 30 tablet  Refills: 3            CONTINUE these medicines which have NOT CHANGED        Dose / Directions   diphenhydrAMINE 25 MG capsule  Commonly known as: BENADRYL      Dose: 25 mg  Take 1 capsule (25 mg) by mouth every 6 hours as needed for itching or allergies  Quantity: 20 capsule  Refills: 0     EPINEPHrine 0.3 MG/0.3ML injection 2-pack  Commonly known as: ANY BX GENERIC EQUIV  Used for: Anaphylaxis, initial encounter      Dose: 0.3 mg  Inject 0.3 mLs (0.3 mg) into the muscle as needed for anaphylaxis  Quantity: 2 each  Refills: 1     NONFORMULARY      Dose: 1 drop  Place 1 drop into both eyes every 4 hours OTC Eye drops - for itchy/dry eyes - q4h during the day  Refills: 0     vitamin C 1000 MG Tabs  Commonly known as: ASCORBIC ACID      Dose: 1,000 mg  Take 1,000 mg by mouth daily  Refills: 0               Discharge diet: regular diet    Discharge activity: Activity as tolerated    Discharge follow-up:    Follow up with primary care provider within one week or earlier if symptoms return or gets worse.    Follow up with consultant as instructed.    Pending Results     Unresulted Labs Ordered in the Past 30 Days of this Admission       No orders found for last 31 day(s).             Patient Allergies     Allergies   Allergen Reactions    Pineapple Anaphylaxis    Orange Fruit [Citrus]     Amoxicillin Rash     Disposition   Disposition: home     I saw and evaluated the patient on day of discharge and  discharge instructions reviewed  and  all the patient's questions and concerns addressed. Over 30 minutes spent on discharge and coordination of discharge process for this patient.

## 2023-11-04 NOTE — PROGRESS NOTES
Patient discharged to home, AVS/ discharge meds and foll- up appointments reviewed with patient. All belongings returned. Patient denies any pain, dizziness. Patient sister here to bring patient home via PMV

## 2023-11-04 NOTE — PLAN OF CARE
PRIMARY DIAGNOSIS: SYNCOPE  OUTPATIENT/OBSERVATION GOALS TO BE MET BEFORE DISCHARGE:  1. Orthostatic performed: Yes:          Lying Orthostatic BP: 113/73         Sitting Orthostatic BP: 116/71         Standing Orthostatic BP: 111/78     2. Diagnostic testing complete & at baseline neurologic testing: Yes    3. Cleared by consultants (if involved): No    4. Interpretation of cardiac rhythm per telemetry tech: SR    5. Tolerating adequate PO diet and medications: Yes    6. Return to near baseline physical activity or neurologic status: Yes    Discharge Planner Nurse   Safe discharge environment identified: Yes  Barriers to discharge: Yes       Entered by: Shannan Granger RN 11/04/2023      /68 (BP Location: Right arm)   Pulse 77   Temp 98.8  F (37.1  C) (Oral)   Resp 16   SpO2 98%   Patient is A&Ox4 VSS on RA. Patient complains of pain to face from where she fell. Refused pain meds. Tele in place- SR. MRI completed this morning. OP cardiac monitoring ordered upon discharge    Please review provider order for any additional goals.   Nurse to notify provider when observation goals have been met and patient is ready for discharge.

## 2023-11-04 NOTE — PLAN OF CARE
Physical Therapy: Orders received. Chart reviewed and discussed with care team.? Physical Therapy not indicated following discussion with RN. RN stating that patient mobilizing independently and not demonstrating any mobility or balance deficits at this time.? Defer discharge recommendations to medical team.? Will complete PT orders.

## 2023-11-06 LAB
ANION GAP BLD CALCULATED.3IONS-SCNC: 17 MMOL/L (ref 3–14)
BUN SERPL-MCNC: 11 MG/DL (ref 7–30)
CA-I BLD-MCNC: 4.9 MG/DL (ref 4.4–5.2)
CHLORIDE BLD-SCNC: 104 MMOL/L (ref 94–109)
CO2 BLD-SCNC: 22 MMOL/L (ref 20–32)
CREAT BLD-MCNC: 0.6 MG/DL (ref 0.5–1)
GLUCOSE BLD-MCNC: 101 MG/DL (ref 70–99)
HCT VFR BLD CALC: 33 % (ref 35–47)
HGB BLD-MCNC: 11.2 G/DL (ref 11.7–15.7)
POTASSIUM BLD-SCNC: 3.8 MMOL/L (ref 3.4–5.3)
SODIUM BLD-SCNC: 138 MMOL/L (ref 133–144)

## 2023-11-14 ENCOUNTER — HOSPITAL ENCOUNTER (OUTPATIENT)
Dept: CARDIOLOGY | Facility: CLINIC | Age: 38
Discharge: HOME | End: 2023-11-14
Attending: INTERNAL MEDICINE | Admitting: INTERNAL MEDICINE
Payer: COMMERCIAL

## 2023-11-14 PROCEDURE — 93244 EXT ECG>48HR<7D REV&INTERPJ: CPT | Performed by: INTERNAL MEDICINE

## 2023-11-14 PROCEDURE — 93246 EXT ECG>7D<15D RECORDING: CPT

## 2024-07-07 ENCOUNTER — HEALTH MAINTENANCE LETTER (OUTPATIENT)
Age: 39
End: 2024-07-07

## 2024-11-10 ENCOUNTER — HOSPITAL ENCOUNTER (EMERGENCY)
Facility: CLINIC | Age: 39
Discharge: HOME | End: 2024-11-11
Attending: EMERGENCY MEDICINE | Admitting: EMERGENCY MEDICINE
Payer: COMMERCIAL

## 2024-11-10 DIAGNOSIS — R55 SYNCOPE, UNSPECIFIED SYNCOPE TYPE: ICD-10-CM

## 2024-11-10 DIAGNOSIS — R40.4 TRANSIENT ALTERATION OF AWARENESS: ICD-10-CM

## 2024-11-10 LAB
ABO/RH(D): NORMAL
ALBUMIN SERPL BCG-MCNC: 4.6 G/DL (ref 3.5–5.2)
ALP SERPL-CCNC: 79 U/L (ref 40–150)
ALT SERPL W P-5'-P-CCNC: 20 U/L (ref 0–50)
ANION GAP SERPL CALCULATED.3IONS-SCNC: 16 MMOL/L (ref 7–15)
ANTIBODY SCREEN: NEGATIVE
AST SERPL W P-5'-P-CCNC: 23 U/L (ref 0–45)
BASE EXCESS BLDV CALC-SCNC: 1.1 MMOL/L (ref -3–3)
BASOPHILS # BLD AUTO: 0 10E3/UL (ref 0–0.2)
BASOPHILS NFR BLD AUTO: 0 %
BILIRUB SERPL-MCNC: 0.4 MG/DL
BUN SERPL-MCNC: 11.6 MG/DL (ref 6–20)
CALCIUM SERPL-MCNC: 8.9 MG/DL (ref 8.8–10.4)
CHLORIDE SERPL-SCNC: 99 MMOL/L (ref 98–107)
CREAT SERPL-MCNC: 0.83 MG/DL (ref 0.51–0.95)
EGFRCR SERPLBLD CKD-EPI 2021: >90 ML/MIN/1.73M2
EOSINOPHIL # BLD AUTO: 0 10E3/UL (ref 0–0.7)
EOSINOPHIL NFR BLD AUTO: 0 %
ERYTHROCYTE [DISTWIDTH] IN BLOOD BY AUTOMATED COUNT: 14.6 % (ref 10–15)
ETHANOL SERPL-MCNC: <0.01 G/DL
GLUCOSE SERPL-MCNC: 132 MG/DL (ref 70–99)
HCO3 BLDV-SCNC: 27 MMOL/L (ref 21–28)
HCO3 SERPL-SCNC: 22 MMOL/L (ref 22–29)
HCT VFR BLD AUTO: 40.2 % (ref 35–47)
HGB BLD-MCNC: 12.7 G/DL (ref 11.7–15.7)
IMM GRANULOCYTES # BLD: 0 10E3/UL
IMM GRANULOCYTES NFR BLD: 0 %
LACTATE SERPL-SCNC: 1.3 MMOL/L (ref 0.7–2)
LIPASE SERPL-CCNC: 58 U/L (ref 13–60)
LYMPHOCYTES # BLD AUTO: 0.8 10E3/UL (ref 0.8–5.3)
LYMPHOCYTES NFR BLD AUTO: 9 %
MAGNESIUM SERPL-MCNC: 2 MG/DL (ref 1.7–2.3)
MCH RBC QN AUTO: 24.1 PG (ref 26.5–33)
MCHC RBC AUTO-ENTMCNC: 31.6 G/DL (ref 31.5–36.5)
MCV RBC AUTO: 76 FL (ref 78–100)
MONOCYTES # BLD AUTO: 0.5 10E3/UL (ref 0–1.3)
MONOCYTES NFR BLD AUTO: 6 %
NEUTROPHILS # BLD AUTO: 6.8 10E3/UL (ref 1.6–8.3)
NEUTROPHILS NFR BLD AUTO: 84 %
NRBC # BLD AUTO: 0 10E3/UL
NRBC BLD AUTO-RTO: 0 /100
O2/TOTAL GAS SETTING VFR VENT: 28 %
OXYHGB MFR BLDV: 35 % (ref 70–75)
PCO2 BLDV: 46 MM HG (ref 40–50)
PH BLDV: 7.38 [PH] (ref 7.32–7.43)
PLATELET # BLD AUTO: 200 10E3/UL (ref 150–450)
PO2 BLDV: 23 MM HG (ref 25–47)
POTASSIUM SERPL-SCNC: 3.6 MMOL/L (ref 3.4–5.3)
PROT SERPL-MCNC: 7.6 G/DL (ref 6.4–8.3)
RBC # BLD AUTO: 5.27 10E6/UL (ref 3.8–5.2)
SAO2 % BLDV: 35.1 % (ref 70–75)
SODIUM SERPL-SCNC: 137 MMOL/L (ref 135–145)
SPECIMEN EXPIRATION DATE: NORMAL
TROPONIN T SERPL HS-MCNC: <6 NG/L
TSH SERPL DL<=0.005 MIU/L-ACNC: 0.98 UIU/ML (ref 0.3–4.2)
WBC # BLD AUTO: 8 10E3/UL (ref 4–11)

## 2024-11-10 PROCEDURE — 999N000157 HC STATISTIC RCP TIME EA 10 MIN

## 2024-11-10 PROCEDURE — 82140 ASSAY OF AMMONIA: CPT | Performed by: EMERGENCY MEDICINE

## 2024-11-10 PROCEDURE — 80051 ELECTROLYTE PANEL: CPT | Performed by: EMERGENCY MEDICINE

## 2024-11-10 PROCEDURE — 84443 ASSAY THYROID STIM HORMONE: CPT | Performed by: EMERGENCY MEDICINE

## 2024-11-10 PROCEDURE — 82077 ASSAY SPEC XCP UR&BREATH IA: CPT | Performed by: EMERGENCY MEDICINE

## 2024-11-10 PROCEDURE — 93005 ELECTROCARDIOGRAM TRACING: CPT

## 2024-11-10 PROCEDURE — 36415 COLL VENOUS BLD VENIPUNCTURE: CPT | Performed by: EMERGENCY MEDICINE

## 2024-11-10 PROCEDURE — 99285 EMERGENCY DEPT VISIT HI MDM: CPT | Mod: 25

## 2024-11-10 PROCEDURE — 86901 BLOOD TYPING SEROLOGIC RH(D): CPT | Performed by: EMERGENCY MEDICINE

## 2024-11-10 PROCEDURE — 84484 ASSAY OF TROPONIN QUANT: CPT | Performed by: EMERGENCY MEDICINE

## 2024-11-10 PROCEDURE — 85025 COMPLETE CBC W/AUTO DIFF WBC: CPT | Performed by: EMERGENCY MEDICINE

## 2024-11-10 PROCEDURE — 83605 ASSAY OF LACTIC ACID: CPT | Performed by: EMERGENCY MEDICINE

## 2024-11-10 PROCEDURE — 999N000055 HC STATISTIC END TITIAL CO2 MONITORING

## 2024-11-10 PROCEDURE — 86900 BLOOD TYPING SEROLOGIC ABO: CPT | Performed by: EMERGENCY MEDICINE

## 2024-11-10 PROCEDURE — 87637 SARSCOV2&INF A&B&RSV AMP PRB: CPT | Performed by: EMERGENCY MEDICINE

## 2024-11-10 PROCEDURE — 84703 CHORIONIC GONADOTROPIN ASSAY: CPT | Performed by: EMERGENCY MEDICINE

## 2024-11-10 PROCEDURE — 82805 BLOOD GASES W/O2 SATURATION: CPT | Performed by: EMERGENCY MEDICINE

## 2024-11-10 PROCEDURE — 83690 ASSAY OF LIPASE: CPT | Performed by: EMERGENCY MEDICINE

## 2024-11-10 PROCEDURE — 83735 ASSAY OF MAGNESIUM: CPT | Performed by: EMERGENCY MEDICINE

## 2024-11-10 ASSESSMENT — ACTIVITIES OF DAILY LIVING (ADL): ADLS_ACUITY_SCORE: 0

## 2024-11-10 NOTE — LETTER
November 11, 2024      To Whom It May Concern:      Lucho Del Real was seen in our Emergency Department today, 11/11/24.  I expect her condition to improve over the next 2 days.  She may return to work/school when improved.    Sincerely,        Janak Quiroz RN

## 2024-11-11 ENCOUNTER — APPOINTMENT (OUTPATIENT)
Dept: CT IMAGING | Facility: CLINIC | Age: 39
End: 2024-11-11
Attending: EMERGENCY MEDICINE
Payer: COMMERCIAL

## 2024-11-11 VITALS
OXYGEN SATURATION: 98 % | DIASTOLIC BLOOD PRESSURE: 75 MMHG | RESPIRATION RATE: 18 BRPM | HEART RATE: 84 BPM | SYSTOLIC BLOOD PRESSURE: 121 MMHG | WEIGHT: 129.41 LBS

## 2024-11-11 LAB
ALBUMIN UR-MCNC: NEGATIVE MG/DL
AMMONIA PLAS-SCNC: 12 UMOL/L (ref 11–51)
APPEARANCE UR: CLEAR
ATRIAL RATE - MUSE: 63 BPM
BACTERIA #/AREA URNS HPF: ABNORMAL /HPF
BILIRUB UR QL STRIP: NEGATIVE
COLOR UR AUTO: ABNORMAL
DIASTOLIC BLOOD PRESSURE - MUSE: NORMAL MMHG
FLUAV RNA SPEC QL NAA+PROBE: NEGATIVE
FLUBV RNA RESP QL NAA+PROBE: NEGATIVE
GLUCOSE UR STRIP-MCNC: NEGATIVE MG/DL
HCG SERPL QL: NEGATIVE
HGB UR QL STRIP: ABNORMAL
INTERPRETATION ECG - MUSE: NORMAL
KETONES UR STRIP-MCNC: NEGATIVE MG/DL
LEUKOCYTE ESTERASE UR QL STRIP: NEGATIVE
MUCOUS THREADS #/AREA URNS LPF: PRESENT /LPF
NITRATE UR QL: NEGATIVE
P AXIS - MUSE: 40 DEGREES
PH UR STRIP: 7 [PH] (ref 5–7)
PR INTERVAL - MUSE: 140 MS
QRS DURATION - MUSE: 86 MS
QT - MUSE: 422 MS
QTC - MUSE: 431 MS
R AXIS - MUSE: 77 DEGREES
RBC URINE: >182 /HPF
RSV RNA SPEC NAA+PROBE: NEGATIVE
SARS-COV-2 RNA RESP QL NAA+PROBE: NEGATIVE
SP GR UR STRIP: 1.01 (ref 1–1.03)
SQUAMOUS EPITHELIAL: <1 /HPF
SYSTOLIC BLOOD PRESSURE - MUSE: NORMAL MMHG
T AXIS - MUSE: 78 DEGREES
UROBILINOGEN UR STRIP-MCNC: NORMAL MG/DL
VENTRICULAR RATE- MUSE: 63 BPM
WBC URINE: 19 /HPF

## 2024-11-11 PROCEDURE — 81001 URINALYSIS AUTO W/SCOPE: CPT | Performed by: EMERGENCY MEDICINE

## 2024-11-11 PROCEDURE — 87088 URINE BACTERIA CULTURE: CPT | Performed by: EMERGENCY MEDICINE

## 2024-11-11 PROCEDURE — 70450 CT HEAD/BRAIN W/O DYE: CPT

## 2024-11-11 ASSESSMENT — ACTIVITIES OF DAILY LIVING (ADL)
ADLS_ACUITY_SCORE: 0
ADLS_ACUITY_SCORE: 0

## 2024-11-11 NOTE — ED PROVIDER NOTES
Emergency Department Note      History of Present Illness     Chief Complaint   Loss of Consciousness    FAITH Del Real is a 29 year old female presenting with loss of conciousness. The patient presents via EMS from her job at [employer]. She was dizzy earlier today after eating. This is normal for her when she's menstruating, and she normally recovers after laying down. The patient is currently on her second day of menstruation. She did not feel better today, and couldn't get up. In the ambulance, she was weaker bilaterally than normal. She was dizzy, distant, then she lost consciousness. Her systolic pressure was at 139 with a blood sugar at 119. 22 in right AC. The patient denies a headache, chest pain, and abdominal pain.       Independent Historian   The EMS provided most of the history.     Review of External Notes   Reviewed hospital admission from February 2021 when she was admitted for anaphylaxis and acute respiratory failure that was thought to be due to a herbal drink.  She came in quite altered and required intubation.  Ultimately was able to be extubated and did well.    Patient also had hospitalization in September 2021 in which she was admitted for a episode of unresponsiveness and reported apnea.  Possibly due to reaction from influenza vaccine but circumstances surrounding unresponsive episode were rather unclear.    Reviewed emergency department visit from 10/7/2021 in which she came in for an episode of unresponsiveness and shortness of breath.  Had a reassuring workup.  Provider brought up question of possible conversion disorder.    Reviewed hospital admission from April 2022 in which she was admitted for possible anaphylaxis of amoxicillin exposure but also had period of unresponsiveness.     Reviewed hospitalization from November 2023 in which she was admitted for syncopal event after taking some NyQuil.  She had MRI that was performed that was negative.  She was set up with a Zio patch  and told to follow-up with neurology.  Subsequent Zio patch was negative for any evidence of arrhythmia.  Patient and sister reports she never followed up with neurology.    Past Medical History     Medical History and Problem List   No pertinent past medical history     Medications   No current perscribed medications       Physical Exam     Patient Vitals for the past 24 hrs:   BP Pulse Resp SpO2 Weight   11/11/24 0200 121/75 84 18 98 % --   11/11/24 0140 116/83 76 -- -- --   11/11/24 0130 119/70 77 -- -- --   11/11/24 0045 (!) 143/90 78 18 98 % --   11/11/24 0030 -- -- 18 100 % --   11/11/24 0015 133/85 80 15 100 % --   11/11/24 0000 131/85 75 -- 100 % --   11/10/24 2315 127/70 70 15 100 % --   11/10/24 2301 126/72 65 -- -- --   11/10/24 2300 126/72 65 -- -- --   11/10/24 2256 126/77 62 -- -- --   11/10/24 2254 -- 63 20 100 % 58.7 kg (129 lb 6.6 oz)     Physical Exam  General: Somnolent but will shake head yes and no to questions.  Head: The scalp, face, and head appear normal  Eyes: The pupils are equal, round, and reactive to light. Conjunctivae and sclerae are normal  ENT: External acoustic canals are normal. The oropharynx is normal without erythema. Uvula is in the midline  Neck: Normal range of motion.   CV: Regular rate and rhythm.   Resp: Lungs are clear without wheezes or rales. No respiratory distress.   GI: Abdomen is soft, no rigidity, guarding, or rebound. No distension. No tenderness to palpation in any quadrant.    MS: Normal tone. Joints grossly normal without effusions. No asymmetric leg swelling, calf or thigh tenderness.    Skin: No rash or lesions noted. Normal capillary refill noted  Neuro: Nonspecific neurologic examination.  Patient with eyes closed and inconsistently following commands.  Patient will nod head yes and no to simple questions but will not squeeze fingers or wiggle toes.  However when down was being moved to perform EKG she grabs at the ground to maintain her modesty.  Seen  moving all extremities.  Shortly after EMS and multiple team members left the room.  Patient slowly awoke and was conversation with family and smiling.  Psych:  Normal affect.  Appropriate interactions.    Diagnostics     Lab Results   Labs Ordered and Resulted from Time of ED Arrival to Time of ED Departure   COMPREHENSIVE METABOLIC PANEL - Abnormal       Result Value    Sodium 137      Potassium 3.6      Carbon Dioxide (CO2) 22      Anion Gap 16 (*)     Urea Nitrogen 11.6      Creatinine 0.83      GFR Estimate >90      Calcium 8.9      Chloride 99      Glucose 132 (*)     Alkaline Phosphatase 79      AST 23      ALT 20      Protein Total 7.6      Albumin 4.6      Bilirubin Total 0.4     BLOOD GAS VENOUS - Abnormal    pH Venous 7.38      pCO2 Venous 46      pO2 Venous 23 (*)     Bicarbonate Venous 27      Base Excess/Deficit Venous 1.1      FIO2 28      Oxyhemoglobin Venous 35 (*)     O2 Sat, Venous 35.1 (*)    ROUTINE UA WITH MICROSCOPIC REFLEX TO CULTURE - Abnormal    Color Urine Straw      Appearance Urine Clear      Glucose Urine Negative      Bilirubin Urine Negative      Ketones Urine Negative      Specific Gravity Urine 1.007      Blood Urine Large (*)     pH Urine 7.0      Protein Albumin Urine Negative      Urobilinogen Urine Normal      Nitrite Urine Negative      Leukocyte Esterase Urine Negative      Bacteria Urine Few (*)     Mucus Urine Present (*)     RBC Urine >182 (*)     WBC Urine 19 (*)     Squamous Epithelials Urine <1     CBC WITH PLATELETS AND DIFFERENTIAL - Abnormal    WBC Count 8.0      RBC Count 5.27 (*)     Hemoglobin 12.7      Hematocrit 40.2      MCV 76 (*)     MCH 24.1 (*)     MCHC 31.6      RDW 14.6      Platelet Count 200      % Neutrophils 84      % Lymphocytes 9      % Monocytes 6      % Eosinophils 0      % Basophils 0      % Immature Granulocytes 0      NRBCs per 100 WBC 0      Absolute Neutrophils 6.8      Absolute Lymphocytes 0.8      Absolute Monocytes 0.5      Absolute  Eosinophils 0.0      Absolute Basophils 0.0      Absolute Immature Granulocytes 0.0      Absolute NRBCs 0.0     LIPASE - Normal    Lipase 58     LACTIC ACID WHOLE BLOOD WITH 1X REPEAT IN 2 HR WHEN >2 - Normal    Lactic Acid, Initial 1.3     TROPONIN T, HIGH SENSITIVITY - Normal    Troponin T, High Sensitivity <6     AMMONIA - Normal    Ammonia 12     MAGNESIUM - Normal    Magnesium 2.0     TSH WITH FREE T4 REFLEX - Normal    TSH 0.98     ETHYL ALCOHOL LEVEL - Normal    Alcohol ethyl <0.01     INFLUENZA A/B, RSV, & SARS-COV2 PCR - Normal    Influenza A PCR Negative      Influenza B PCR Negative      RSV PCR Negative      SARS CoV2 PCR Negative     HCG QUALITATIVE PREGNANCY - Normal    hCG Serum Qualitative Negative     TYPE AND SCREEN, ADULT    ABO/RH(D) A POS      Antibody Screen Negative      SPECIMEN EXPIRATION DATE [id number]     URINE CULTURE   ABO/RH TYPE AND SCREEN       Imaging   CT Head w/o Contrast   Final Result   IMPRESSION:   1.  No acute intracranial process.          EKG   ECG taken at 2255, ECG read at 2255  Normal sinus rhythm   Normal ECG   Rate 63 bpm. TX interval 140 ms. QRS duration 86 ms. QT/QTc 422/431 ms. P-R-T axes 40 77 78.      ED Course        ED Course   ED Course as of 11/11/24 0328   Sun Nov 10, 2024   2250 I obtained the history and examined the patient as above.    Mon Nov 11, 2024   0007 I rechecked and updated the patient.           Medical Decision Making / Diagnosis     BATSHEVA Del Real is a 29 year old female who presents to the emergency department after episode of syncope and altered mental status.  Based on the patient's history and EMS's description this sounds like a syncopal event.  On initial evaluation here she is hemodynamically stable with normal vital signs.  She is afebrile and oxygenating well on room air.  Based on her history I did have some concern that she may be anemic however blood work shows a normal hemoglobin.  I performed a very thorough and broad  workup to investigate possible causes of syncope, altered mental status and possible seizure.  Overall her workup was very reassuring.  After period of time sister came to the emergency department and reported that the name on her armband was incorrectly noted.  We were able to merge her previous chart and I was able to see her past medical history.  Unfortunately patient has has had several very strange episodes of loss of consciousness, anaphylaxis and presyncope.  There does not appear to be a clear pattern.  However she has been evaluated with Zio patch which has not shown any significant arrhythmia.  She has had an echo which has not showed any significant heart failure or structural abnormality.  MRI was performed during one of her hospitalizations which was otherwise reassuring.  Workup here today does not show any significant syncopal or seizure nidus.  However given these multiple hospitalizations, ER visits and workups without clear diagnosis it does raise suspicion for more psychiatric pathology including conversion disorder or pseudoseizure.  However I would recommend that she be evaluated by neurology and cardiology to ensure that we are not missing a deleterious arrhythmia or seizure disorder.  I reviewed this with the patient and sister who are amenable to follow-up.  I advised the patient that she should not drive or be in any dangerous situations in which a loss of consciousness may be dangerous.  On reevaluation patient felt much improved and was back to baseline.  She does not want to stay in the hospital and is requesting discharge home.    Disposition   The patient was discharged.     Diagnosis     ICD-10-CM    1. Syncope, unspecified syncope type  R55       2. Transient alteration of awareness  R40.4            Scribe Disclosure:  I, Phoenix Peterson, am serving as a scribe at 10:56 PM on 11/10/2024 to document services personally performed by Nura Morgan MD based on my  observations and the provider's statements to me.        Nura Morgan MD  11/11/24 [id number]

## 2024-11-11 NOTE — ED TRIAGE NOTES
Pt arrives via EMS from work. EMS reports that pt ate and got dizzy, which is something that often happens to her, but it did not improve, pt then started to get worse feeling dizzy and not able to get up. EMS arrived and pt suddenly became bradycardic and loss of consciousness. EMS able to get reaction with sternal rub and increased HR in the 70's and increase in BP, EMS confirmed no medications were given. EMS 22G in right AC.

## 2024-11-12 ENCOUNTER — TELEPHONE (OUTPATIENT)
Dept: NURSING | Facility: CLINIC | Age: 39
End: 2024-11-12
Payer: COMMERCIAL

## 2024-11-12 LAB
BACTERIA UR CULT: ABNORMAL
BACTERIA UR CULT: ABNORMAL

## 2024-11-12 NOTE — LETTER
November 12, 2024        Lucho Del Real  [id number] [address] [facility] [id number]          Dear Lucho Del Real:    You were seen in the [facility] Emergency Department at [facility] on 11/11/2024.  We are unable to reach you by phone, so we are sending you this letter.     It is important that you call [facility] Emergency Department lab result nurse at [phone], as we have information to relay to you AND/OR we MAY have to make some changes in your treatment.    Best time to call back is between 9AM and 5:30PM, 7 days a week.      Sincerely,     [facility] Emergency Department Lab Result RN  [phone]

## 2024-11-12 NOTE — TELEPHONE ENCOUNTER
[facility]    Reason for call: Lab Result Notification     Lab Result (including Rx patient on, if applicable).  If culture, copy of lab report at bottom.  Lab Result: Urine Culture - See Below    ED Rx: No antibiotic prescribed    Creatinine Level (mg/dl)   Creatinine   Date Value Ref Range Status   11/10/2024 0.83 0.51 - 0.95 mg/dL Final   02/20/2021 0.53 0.52 - 1.04 mg/dL Final    Creatinine clearance (ml/min), if applicable    Creatinine clearance cannot be calculated (Unknown ideal weight.)     ED Symptoms: Presented to the ED after a syncopal episode.     Current Symptoms: Unable to assess.     RN Recommendations/Instructions per [facility] ED lab result protocol:   [facility] ED lab result protocol utilized: Urine Culture  Instruct to start antibiotic: Would recommend starting Cefpodoxime pending patient assessment.     Unable to reach patient/caregiver.   Left voicemail message requesting a call back to [phone] between 9 a.m. and 5:30 p.m. for patient's ED/ lab results.    Letter pended to be sent via [employer].       FARZANEH NAILS RN

## 2025-07-19 ENCOUNTER — HEALTH MAINTENANCE LETTER (OUTPATIENT)
Age: 40
End: 2025-07-19

## (undated) DEVICE — GLOVE PROTEXIS W/NEU-THERA 7.0  2D73TE70

## (undated) DEVICE — LINEN TOWEL PACK X10 5473

## (undated) DEVICE — GLOVE PROTEXIS POWDER FREE SMT 7.5  2D72PT75X

## (undated) DEVICE — GLOVE PROTEXIS BLUE W/NEU-THERA 8.0  2D73EB80

## (undated) DEVICE — PACK C-SECTION LF PL15OTA83B

## (undated) DEVICE — HEMOSTAT ABSORBABLE ARISTA 5GM SM0007-USA

## (undated) DEVICE — STOCKING SLEEVE VASOPRESS COMPRESSION CALF MED 18" VP501M

## (undated) DEVICE — PREP SKIN SCRUB TRAY 4461A

## (undated) DEVICE — BLADE CLIPPER SGL USE 9680

## (undated) DEVICE — BAG CLEAR TRASH 1.3M 39X33" P4040C

## (undated) DEVICE — SU VICRYL 0 CT-1 36" J346H

## (undated) DEVICE — LINEN HALF SHEET 5512

## (undated) DEVICE — SU VICRYL 2-0 CT-1 36" J345H

## (undated) DEVICE — PAD CHUX UNDERPAD 30X36" P3036C

## (undated) DEVICE — CATH TRAY FOLEY SURESTEP 16FR DRAIN BAG STATOCK A899916

## (undated) DEVICE — CAP BABY PINK/BLUE IC-2

## (undated) DEVICE — PREP POVIDONE IODINE SOLUTION 10% 120ML

## (undated) DEVICE — SOL NACL 0.9% IRRIG 1000ML BOTTLE 2F7124

## (undated) DEVICE — SOL WATER IRRIG 1000ML BOTTLE 2F7114

## (undated) DEVICE — STPL SKIN 35W APPOSE 8886803712

## (undated) DEVICE — LINEN DRAPE 54X72" 5467

## (undated) DEVICE — LINEN BABY BLANKET 5434

## (undated) DEVICE — ESU GROUND PAD ADULT W/CORD E7507

## (undated) DEVICE — TRANSFER DEVICE BLOOD NDL HOLDER 364880

## (undated) DEVICE — LINEN FULL SHEET 5511

## (undated) DEVICE — GLOVE PROTEXIS MICRO 6.5  2D73PM65

## (undated) DEVICE — SUCTION CANISTER MEDIVAC LINER 3000ML W/LID 65651-530

## (undated) DEVICE — PREP CHLORAPREP 26ML TINTED ORANGE  260815

## (undated) RX ORDER — OXYTOCIN/0.9 % SODIUM CHLORIDE 30/500 ML
PLASTIC BAG, INJECTION (ML) INTRAVENOUS
Status: DISPENSED
Start: 2018-04-05

## (undated) RX ORDER — MORPHINE SULFATE 1 MG/ML
INJECTION, SOLUTION EPIDURAL; INTRATHECAL; INTRAVENOUS
Status: DISPENSED
Start: 2018-04-05

## (undated) RX ORDER — EPHEDRINE SULFATE 50 MG/ML
INJECTION, SOLUTION INTRAMUSCULAR; INTRAVENOUS; SUBCUTANEOUS
Status: DISPENSED
Start: 2018-04-05

## (undated) RX ORDER — IBUPROFEN 600 MG/1
TABLET, FILM COATED ORAL
Status: DISPENSED
Start: 2017-05-19